# Patient Record
Sex: MALE | Race: WHITE | Employment: OTHER | URBAN - METROPOLITAN AREA
[De-identification: names, ages, dates, MRNs, and addresses within clinical notes are randomized per-mention and may not be internally consistent; named-entity substitution may affect disease eponyms.]

---

## 2017-03-23 LAB — HEMOGLOBIN A1C: 6.4

## 2017-03-31 ENCOUNTER — HOSPITAL ENCOUNTER (OUTPATIENT)
Dept: NON INVASIVE DIAGNOSTICS | Age: 82
Discharge: HOME | End: 2017-03-31
Attending: INTERNAL MEDICINE
Payer: MEDICARE

## 2017-03-31 DIAGNOSIS — R60.9 EDEMA: ICD-10-CM

## 2017-03-31 DIAGNOSIS — R01.1 UNDIAGNOSED CARDIAC MURMURS: ICD-10-CM

## 2017-03-31 DIAGNOSIS — E11.9 DIABETES MELLITUS (HCC): ICD-10-CM

## 2017-03-31 PROCEDURE — 93306 TTE W/DOPPLER COMPLETE: CPT

## 2017-10-01 RX ORDER — CLOPIDOGREL BISULFATE 75 MG/1
TABLET ORAL
Qty: 90 TAB | Refills: 3 | Status: SHIPPED | OUTPATIENT
Start: 2017-10-01 | End: 2017-10-06 | Stop reason: SDUPTHER

## 2017-10-06 ENCOUNTER — OFFICE VISIT (OUTPATIENT)
Dept: INTERNAL MEDICINE CLINIC | Age: 82
End: 2017-10-06

## 2017-10-06 VITALS
SYSTOLIC BLOOD PRESSURE: 137 MMHG | BODY MASS INDEX: 30.31 KG/M2 | HEART RATE: 71 BPM | OXYGEN SATURATION: 97 % | DIASTOLIC BLOOD PRESSURE: 85 MMHG | WEIGHT: 200 LBS | RESPIRATION RATE: 18 BRPM | HEIGHT: 68 IN | TEMPERATURE: 97.9 F

## 2017-10-06 DIAGNOSIS — Z13.31 SCREENING FOR DEPRESSION: ICD-10-CM

## 2017-10-06 DIAGNOSIS — R60.9 EDEMA, UNSPECIFIED TYPE: ICD-10-CM

## 2017-10-06 DIAGNOSIS — K51.919 ULCERATIVE COLITIS WITH COMPLICATION, UNSPECIFIED LOCATION (HCC): Chronic | ICD-10-CM

## 2017-10-06 DIAGNOSIS — M25.561 CHRONIC PAIN OF BOTH KNEES: ICD-10-CM

## 2017-10-06 DIAGNOSIS — Z00.00 MEDICARE ANNUAL WELLNESS VISIT, SUBSEQUENT: Primary | ICD-10-CM

## 2017-10-06 DIAGNOSIS — G45.9 TRANSIENT CEREBRAL ISCHEMIA, UNSPECIFIED TYPE: ICD-10-CM

## 2017-10-06 DIAGNOSIS — I10 ESSENTIAL HYPERTENSION, BENIGN: ICD-10-CM

## 2017-10-06 DIAGNOSIS — Z71.89 ADVANCED CARE PLANNING/COUNSELING DISCUSSION: ICD-10-CM

## 2017-10-06 DIAGNOSIS — E11.9 TYPE 2 DIABETES MELLITUS WITHOUT COMPLICATION, WITHOUT LONG-TERM CURRENT USE OF INSULIN (HCC): ICD-10-CM

## 2017-10-06 DIAGNOSIS — Z23 ENCOUNTER FOR IMMUNIZATION: ICD-10-CM

## 2017-10-06 DIAGNOSIS — M25.562 CHRONIC PAIN OF BOTH KNEES: ICD-10-CM

## 2017-10-06 DIAGNOSIS — R01.1 HEART MURMUR: ICD-10-CM

## 2017-10-06 DIAGNOSIS — G89.29 CHRONIC PAIN OF BOTH KNEES: ICD-10-CM

## 2017-10-06 DIAGNOSIS — E78.5 HYPERLIPIDEMIA LDL GOAL <100: ICD-10-CM

## 2017-10-06 RX ORDER — CLOPIDOGREL BISULFATE 75 MG/1
75 TABLET ORAL DAILY
Qty: 90 TAB | Refills: 4 | Status: SHIPPED | COMMUNITY
Start: 2017-10-06 | End: 2018-09-24 | Stop reason: SDUPTHER

## 2017-10-06 NOTE — PATIENT INSTRUCTIONS
Medicare Part B Preventive Services Guidelines/Limitations Date last completed and Frequency Due Date   Cardiovascular Screening Blood Tests (every 5 years)  Total cholesterol, HDL, Triglycerides Order as a panel if possible Completed 8/2016    As recommended by your PCP As recommended by your PCP or Specialist   Colorectal Cancer Screening  -Fecal occult blood test (annual)  -Flexible sigmoidoscopy (5y)  -Screening colonoscopy (10y)  -Barium Enema Age 49-80; After age [de-identified] if history of abnormal results As recommended by your PCP or Specialist     Recommended every 5 to 10 years  As recommended by your PCP or Specialist     Counseling to Prevent Tobacco Use (up to 8 sessions per year)  - Counseling greater than 3 and up to 10 minutes  - Counseling greater than 10 minutes Patients must be asymptomatic of tobacco-related conditions to receive as preventive service N/A N/A   Diabetes Screening Tests (at least every 3 years, Medicare covers annually or at 6-month intervals for prediabetic patients)    Fasting blood sugar (FBS) or glucose tolerance test (GTT) Patient must be diagnosed with one of the following:  -Hypertension, Dyslipidemia, obesity, previous impaired FBS or GTT  Or any two of the following: overweight, FH of diabetes, age ? 72, history of gestational diabetes, birth of baby weighing more than 9 pounds Completed 3/2017    Recommended every 3-6 months for Pre-Diabetics and Diabetics As recommended by your PCP or Specialist     Glaucoma Screening (no USPSTF recommendation) Diabetes mellitus, family history, , age 48 or over,  American, age 72 or over Completed within the last year    Recommended annually As recommended by your PCP or Specialist   Prostate Cancer Screening (annually up to age 76)  - Digital rectal exam (VERENICE)  - Prostate specific antigen (PSA) Annually (age 48 or over), VERENICE not paid separately when covered E/M service is provided on same date As recommended by your PCP or Specialist    Recommended annually to age 76 As recommended by your PCP or Specialist     Seasonal Influenza Vaccination (annually)  Completed 10/2017    Recommended Annually Complete   TDAP Vaccination  Completed 8/2011    Recommended every 10 years As recommended by your PCP or Specialist   Zoster (Shingles) Vaccination Covered by Medicare Part D through the pharmacy- PCP provides prescription As recommended by your PCP or Specialist    Recommended once over age 48  Complete   Pneumococcal Vaccination (once after 72)  Pneumo 23-   Recommended once over the age of 72    Prevnar 15- 10/2016 Recommended once over the age of 72 Completed several years ago      Complete     Ultrasound Screening for Abdominal Aortic Aneurysm (AAA) (once) Patient must be referred through IPPE and not have had a screening for abdominal aortic aneurysm before under Medicare. Limited to patients who meet one of the following criteria:  - Men who are 73-68 years old and have smoked more than 100 cigarettes in their lifetime.  -Anyone with a FH of AAA  -Anyone recommended for screening by USPSTF Not indicated unless recommended by PCP   Not indicated unless recommended by PCP     Family Practice Management 2011    Please bring a copy of your completed advance medical directive to the office so it may be added to your medical record. Thank you. If you have any questions or concerns please feel free to contact me at [phone]. It was a pleasure meeting you today and participating in your healthcare. Lisa Conley RN      Medicare Wellness Visit, Male    The best way to live healthy is to have a healthy lifestyle by eating a well-balanced diet, exercising regularly, limiting alcohol and stopping smoking. Regular physical exams and screening tests are another way to keep healthy. Preventive exams provided by your health care provider can find health problems before they become diseases or illnesses.  Preventive services including immunizations, screening tests, monitoring and exams can help you take care of your own health. All people over age 72 should have a pneumovax  and and a prevnar shot to prevent pneumonia. These are once in a lifetime unless you and your provider decide differently. All people over 65 should have a yearly flu shot and a tetanus vaccine every 10 years. Screening for diabetes mellitus with a blood sugar test should be done every year. Glaucoma is a disease of the eye due to increased ocular pressure that can lead to blindness and it should be done every year by an eye professional.    Cardiovascular screening tests that check for elevated lipids (fatty part of blood) which can lead to heart disease and strokes should be done every 5 years. Colorectal screening that evaluates for blood or polyps in your colon should be done yearly as a stool test or every five years as a flexible sigmoidoscope or every 10 years as a colonoscopy up to age 76. Men up to age 76 may need a screening blood test for prostate cancer at certain intervals, depending on their personal and family history. This decision is between the patient and his provider. If you have been a smoker or had family history of abdominal aortic aneurysms, you and your provider may decide to schedule an ultrasound test of your aorta. Hepatitis C screening is also recommended for anyone born between [birth year] through [address]. A shingles vaccine is also recommended once in a lifetime after age 61. Your Medicare Wellness Exam is recommended annually.     Here is a list of your current Health Maintenance items with a due date:  Health Maintenance Due   Topic Date Due    Shingles Vaccine  10/26/1990    Eye Exam  02/26/2017    Albumin Urine Test  04/19/2017    Diabetic Foot Care  05/04/2017    Cholesterol Test   08/13/2017    Hemoglobin A1C    09/22/2017

## 2017-10-06 NOTE — PROGRESS NOTES
Nurse Navigator Medicare Wellness Visit performed by MARLEE Beltran RN    This is a Subsequent Yousuf Exam (AWV) (Performed 12 months after IPPE or effective date of Medicare Part B enrollment, Once in a lifetime)    I have reviewed the patient's medical history in detail and updated the computerized patient record. History     Past Medical History:   Diagnosis Date    Arrhythmia     a fib on occasion    Arthritis     Autoimmune disease ([address]     Questionable Fibromyalgia    Cancer ([address]     basal cell on nose, ear and cheek    Chronic pain     Diabetes ([address]     Hypertension     Other and unspecified hyperlipidemia 4/7/2013    Other ill-defined conditions(799.89)     bilateral glaucoma    Other ill-defined conditions(799.89)     UC    Sleep apnea     Stool color black     Stroke ([address] 11/2010 4/13, 6/15  TIA    UC (ulcerative colitis) ([facility] [address] 10/14/2009    Unspecified sleep apnea     on bipap      Past Surgical History:   Procedure Laterality Date    HX COLONOSCOPY  12/2014    HX ORTHOPAEDIC      left knee arthroscopy     Current Outpatient Prescriptions   Medication Sig Dispense Refill    clopidogrel (PLAVIX) 75 mg tab Take 1 Tab by mouth daily. 90 Tab 4    furosemide (LASIX) 20 mg tablet       COREG CR 10 mg CR capsule       buPROPion (WELLBUTRIN) 100 mg tablet Take 1 Tab by mouth two (2) times a day. 60 Tab 3    cholecalciferol, vitamin D3, (VITAMIN D3) 2,000 unit tab Take  by mouth.  cyanocobalamin (VITAMIN B-12) 1,000 mcg tablet Take 1,000 mcg by mouth daily.  FOLIC ACID/MULTIVIT-MIN/LUTEIN (CENTRUM SILVER PO) Take  by mouth.  Calcium-Cholecalciferol, D3, 600 mg(1,500mg) -400 unit chew Take  by mouth.  timolol (BETIMOL) 0.5 % ophthalmic solution Administer 1 drop to both eyes two (2) times a day. use in affected eye(s)      sulfasalazine (AZULFIDINE) 500 mg tablet Take 500 mg by mouth two (2) times a day.  2 tabs      folic acid (FOLVITE) 1 mg tablet Take  by mouth daily.  metFORMIN (GLUCOPHAGE) 1,000 mg tablet Take 1,000 mg by mouth two (2) times daily (with meals).  predniSONE (DELTASONE) 1 mg tablet Take 4 mg by mouth daily (with breakfast).  omega-3 fatty acids-vitamin e (FISH OIL) 1,000 mg Cap Take 1 Cap by mouth three (3) times daily.  benazepril (LOTENSIN) 40 mg tablet Take 40 mg by mouth daily.  bimatoprost (LUMIGAN) 0.03 % ophthalmic drops Administer 1 Drop to both eyes every evening.  ACETAMINOPHEN (TYLENOL ARTHRITIS PAIN PO) Take 500 mg by mouth two (2) times a day.  [facility]) 3.75 gram PwPk powder packet Take 625 g by mouth daily (after breakfast). Indications: 625mg tablets takes 3 tablets by mouth every am and pm       Allergies   Allergen Reactions    Statins-Hmg-Coa Reductase Inhibitors Myalgia     Family History   Problem Relation Age of Onset    Stroke Mother     Dementia Mother     Cancer Father      pancreatic    Stroke Brother     Cancer Brother     Stroke Brother      Social History   Substance Use Topics    Smoking status: Never Smoker    Smokeless tobacco: Never Used    Alcohol use No     Patient Active Problem List   Diagnosis Code    Essential hypertension, benign I10    Type 2 diabetes mellitus without complication ([facility] U44.5    UC (ulcerative colitis) ([facility] [facility] K51.90    TIA (transient ischemic attack) G45.9    Hyperlipidemia LDL goal <100 E78.5    DJD (degenerative joint disease) M19.90    PMR (polymyalgia rheumatica) ([facility] [address] M35.3    Sleep apnea G47.30    Advanced care planning/counseling discussion Z71.89       Depression Risk Factor Screening:   Patient denies feelings of being down, depressed or hopeless at this time. Patient states that they have a strong support system within their family & friends.    PHQ over the last two weeks 10/6/2017   Little interest or pleasure in doing things Not at all   Feeling down, depressed or hopeless Not at all   Total Score PHQ 2 0     Alcohol Risk Factor Screening: You do not drink alcohol or very rarely. Functional Ability and Level of Safety:   Hearing Loss  Hearing is good. Activities of Daily Living  The home contains: handrails and grab bars  Patient needs help with:  transportation, shopping, preparing meals, laundry, housework and managing money   Patient states that he lives with his wife in a private residence. Patient ambulates with the assistance of a rollator for stability & balance. Patient attempts to be independent in his ADL's, but his wife does provide assistance with the items listed in chart below. Patient shares that his wife provides his transportation. Patient states that he would like to be more physically active, but he is limited due to his medical history. ADL Assessment 10/6/2017   Feeding yourself No Help Needed   Getting from bed to chair No Help Needed   Getting dressed No Help Needed   Bathing or showering No Help Needed   Walk across the room (includes cane/walker) No Help Needed   Using the telphone No Help Needed   Taking your medications No Help Needed   Preparing meals Help Needed   Managing money (expenses/bills) Help Needed   Moderately strenuous housework (laundry) Help Needed   Shopping for personal items (toiletries/medicines) Help Needed   Shopping for groceries Help Needed   Driving Help Needed   Climbing a flight of stairs Help Needed   Getting to places beyond walking distances Help Needed     Patient denies falls within the past year & verbalizes awareness of fall prevention strategies. Fall RiskFall Risk Assessment, last 12 mths 10/6/2017   Able to walk? Yes   Fall in past 12 months? No       Abuse Screen  Patient is not abused   Abuse Screening Questionnaire 10/6/2017   Do you ever feel afraid of your partner? N   Are you in a relationship with someone who physically or mentally threatens you? N   Is it safe for you to go home?  Y       Cognitive Screening   Evaluation of Cognitive Function:  Has your family/caregiver stated any concerns about your memory: no      Patient Care Team   Patient Care Team:  Reuben Balderas MD as PCP - General (Internal Medicine)    Assessment/Plan   Education and counseling provided:  Are appropriate based on today's review and evaluation  End-of-Life planning (with patient's consent)  Pneumococcal Vaccine  Influenza Vaccine  Prostate cancer screening tests (PSA, covered annually)  Colorectal cancer screening tests  Screening for glaucoma  tdap & shingles vaccinations    Diagnoses and all orders for this visit:    1. Transient cerebral ischemia, unspecified type  -     clopidogrel (PLAVIX) 75 mg tab; Take 1 Tab by mouth daily. 2. Type 2 diabetes mellitus without complication, without long-term current use of insulin ([facility]    3. Essential hypertension, benign    4. Heart murmur    5. Hyperlipidemia LDL goal <100    6. Edema, unspecified type    7. Ulcerative colitis with complication, unspecified location ([facility] [address]    8. Encounter for immunization  -     Influenza virus vaccine ([address]) 72 years and older    5. Chronic pain of both knees    AWV Summary:    1. Patient states that a completed Advanced Medical Directive is at home. NN encouraged patient to bring a copy of the completed Advanced Medical Directive to the office for scanning into the medical record. Patient verbalized understanding & agreement. 2. Patient is up to date on the following immunizations:  Immunization History   Administered Date(s) Administered    Influenza High Dose Vaccine PF 10/15/2015, 10/06/2017    Pneumococcal Conjugate (PCV-13) 10/23/2016    Tdap 08/06/2011   Patient confirmed the aforementioned preventative immunization dates are correct. Patient denies receiving a shingles vaccine in the past & patient denies ever having a case of shingles in the past. Patient is unable to recall the date of their last pneumovax 23 vaccine.  NN encouraged patient to check home records & if information obtained, to please notify PCP's office with the details. Patient verbalized agreement. Patient's health maintenance immunization record has been updated & is current. 3. Due to the patient's age, screening PSA's & screening colonoscopies (report on file from 12/5/2014 performed by Dr. Juarez Evans) are no longer indicated unless recommended by PCP or a specialist.    4. Patient was not wearing corrective lenses. Patient reports having a routine eye exam & glaucoma screening within the last year performed by [facility]. Erlin Montana at the [facility]. FAVIAN faxed requesting a copy of patient's last eye exam with glaucoma screening with patient's verbal approval.     Patient verbalized understanding of all information discussed. Patient was given the opportunity to ask questions. Medication reconciliation completed by MA/ LPN and reviewed by PCP. Patient provided AVS, either a printed version or electronic version in emoteShare, which includes Medicare Wellness Preventative Screening Table.       Health Maintenance Due   Topic Date Due    ZOSTER VACCINE AGE 60>  10/26/1990    EYE EXAM RETINAL OR DILATED Q1  02/26/2017    MICROALBUMIN Q1  04/19/2017    FOOT EXAM Q1  05/04/2017    LIPID PANEL Q1  08/13/2017    HEMOGLOBIN A1C Q6M  09/22/2017

## 2017-10-06 NOTE — PROGRESS NOTES
HISTORY OF PRESENT ILLNESS  Joselo Chavez is a 80 y.o. male. HPI   Patient reports memory has been doing great. Patient continues on Plavix. Patient reports Dr. Karen Angel noticed heart murmur for first time. He states there has been no change since March. Patient continues on Lasix twice a day. Patient reports he still has severe knee pain and can not tolerate walking up and down stairs. Diabetic Review of Systems - medication compliance: compliant all of the time, diabetic diet compliance: compliant all of the time, home glucose monitoring: is performed sporadically, further diabetic ROS: no polyuria or polydipsia, no chest pain, dyspnea or TIA's, no numbness, tingling or pain in extremities. Other symptoms and concerns: Patient states his fasting sugars have been around 110. Patient last A1C was 6.4%  Hypertension ROS: taking medications as instructed, no medication side effects noted, no TIA's, no chest pain on exertion, no dyspnea on exertion, no swelling of ankles. New concerns:  Patient's BP in office today is 137/85. Hyperlipidemia:  Cardiovascular risk analysis - 80 y.o. male LDL goal is under 130. ROS: taking medications as instructed, no medication side effects noted, no TIA's, no chest pain on exertion, no dyspnea on exertion, no swelling of ankles. Tolerating meds, no myalgias or other side effects noted  New concerns: Patient repots pain with statins. He is currently not on any medications. Review of Systems   All other systems reviewed and are negative. Physical Exam   Constitutional: He is oriented to person, place, and time. He appears well-developed and well-nourished. HENT:   Head: Normocephalic and atraumatic. Right Ear: External ear normal.   Left Ear: External ear normal.   Nose: Nose normal.   Mouth/Throat: Oropharynx is clear and moist.   Eyes: Conjunctivae and EOM are normal.   Neck: Normal range of motion. Neck supple.    Cardiovascular: Normal rate, regular rhythm, normal heart sounds and intact distal pulses. Pulmonary/Chest: Effort normal and breath sounds normal.   Abdominal: Soft. Bowel sounds are normal.   Genitourinary: Testes normal.   Musculoskeletal: Normal range of motion. Neurological: He is alert and oriented to person, place, and time. Skin: Skin is warm and dry. Psychiatric: He has a normal mood and affect. His behavior is normal. Judgment and thought content normal.   Nursing note and vitals reviewed. ASSESSMENT and PLAN  Diagnoses and all orders for this visit:    1. Transient cerebral ischemia, unspecified type  Stable - continue current medications. -     clopidogrel (PLAVIX) 75 mg tab; Take 1 Tab by mouth daily. 2. Type 2 diabetes mellitus without complication, without long-term current use of insulin (HCC)  Sugars stable. Continue to follow diabetic diet and monitor sugars. 3. Essential hypertension, benign  BP is at goal. I do not recommend any change in medications. 4. Heart murmur  Stable - no change, continue to follow up with Dr. Katia Scott. 5. Hyperlipidemia LDL goal <100  Patient can not tolerate statins and is not no medication at this time. Will continue to monitor. 6. Edema, unspecified type  Stable - continue on current medications and continue to follow up with Dr. Katia Scott    7. Ulcerative colitis with complication, unspecified location ([facility] [address]    8. Encounter for immunization  Administered flu vaccine today in office.   -     Influenza virus vaccine ([address]) 72 years and older    5. Chronic pain of both knees  Patient is avoiding stairs, continue to stretch and walk.    lab results and schedule of future lab studies reviewed with patient  reviewed diet, exercise and weight control    Written by Sydni Schaeffer, as dictated by Felipa Eaton MD.     Current diagnosis and concerns discussed with pt at length.  Understands risks and benefits or current treatment plan and medications and accepts the treatment and medication with any possible risks. Pt asks appropriate questions which were answered. Pt instructed to call with any concerns or problems.

## 2018-09-24 ENCOUNTER — TELEPHONE (OUTPATIENT)
Dept: INTERNAL MEDICINE CLINIC | Age: 83
End: 2018-09-24

## 2018-09-24 DIAGNOSIS — G45.9 TRANSIENT CEREBRAL ISCHEMIA, UNSPECIFIED TYPE: ICD-10-CM

## 2018-09-24 RX ORDER — CLOPIDOGREL BISULFATE 75 MG/1
TABLET ORAL
Qty: 90 TAB | Refills: 3 | Status: SHIPPED | OUTPATIENT
Start: 2018-09-24 | End: 2019-09-22 | Stop reason: SDUPTHER

## 2018-10-17 ENCOUNTER — OFFICE VISIT (OUTPATIENT)
Dept: INTERNAL MEDICINE CLINIC | Age: 83
End: 2018-10-17

## 2018-10-17 VITALS
TEMPERATURE: 98.3 F | WEIGHT: 197.6 LBS | OXYGEN SATURATION: 96 % | RESPIRATION RATE: 14 BRPM | BODY MASS INDEX: 29.95 KG/M2 | HEIGHT: 68 IN | HEART RATE: 84 BPM | SYSTOLIC BLOOD PRESSURE: 136 MMHG | DIASTOLIC BLOOD PRESSURE: 75 MMHG

## 2018-10-17 DIAGNOSIS — M15.8 OTHER OSTEOARTHRITIS INVOLVING MULTIPLE JOINTS: ICD-10-CM

## 2018-10-17 DIAGNOSIS — Z00.00 MEDICARE ANNUAL WELLNESS VISIT, SUBSEQUENT: Primary | ICD-10-CM

## 2018-10-17 DIAGNOSIS — E11.9 TYPE 2 DIABETES MELLITUS WITHOUT COMPLICATION, WITHOUT LONG-TERM CURRENT USE OF INSULIN (HCC): ICD-10-CM

## 2018-10-17 DIAGNOSIS — I10 ESSENTIAL HYPERTENSION, BENIGN: ICD-10-CM

## 2018-10-17 DIAGNOSIS — Z23 ENCOUNTER FOR IMMUNIZATION: ICD-10-CM

## 2018-10-17 DIAGNOSIS — E78.5 HYPERLIPIDEMIA LDL GOAL <100: ICD-10-CM

## 2018-10-17 DIAGNOSIS — K51.919 ULCERATIVE COLITIS WITH COMPLICATION, UNSPECIFIED LOCATION (HCC): ICD-10-CM

## 2018-10-17 DIAGNOSIS — R60.9 EDEMA, UNSPECIFIED TYPE: ICD-10-CM

## 2018-10-17 NOTE — PROGRESS NOTES
HISTORY OF PRESENT ILLNESS Roberth Griffin is a 80 y.o. male. HPI Hypertension ROS: taking medications as instructed, no medication side effects noted, no TIA's, no chest pain on exertion, no dyspnea on exertion, no swelling of ankles. New concerns:  Patient's BP in office today is 136/75. He continues on current meds. Edema: Stable, and well-managed with Lasix. Diabetic Review of Systems - medication compliance: compliant all of the time, further diabetic ROS: no polyuria or polydipsia, no chest pain, dyspnea or TIA's, no numbness, tingling or pain in extremities. Other symptoms and concerns: Pt takes Metformin (500mg 3x/day). He continues to f/u with Dr. Sarah Lopez. His last A1c was 6.6%. Hyperlipidemia: 
Cardiovascular risk analysis - 80 y.o. male LDL goal is under 100. ROS: No TIA's, no chest pain on exertion, no dyspnea on exertion, no swelling of ankles. New concerns: Pt doesn't take statins due to side effects (myalgia). TIA: Stable, and well-managed with Plavix. UC: Stable, and well-managed with Azulfidine. Pt endorses stable bowels. Denies blood in stool. Osteoarthritis: Stable. Pt continues to experience pain in knee joints. He avoids stairs and takes Prednisone (1mg/day). Denies greater strength in one leg over other. He continues to f/u with Dr. Laurie Davis (rheumatologist) who monitors Prednisone. Pt endorses stable memory. Review of Systems All other systems reviewed and are negative. Physical Exam  
Constitutional: He is oriented to person, place, and time. He appears well-developed and well-nourished. HENT:  
Head: Normocephalic and atraumatic. Right Ear: External ear normal.  
Left Ear: External ear normal.  
Nose: Nose normal.  
Mouth/Throat: Oropharynx is clear and moist.  
Eyes: Conjunctivae and EOM are normal.  
Neck: Normal range of motion. Neck supple. Cardiovascular: Normal rate, regular rhythm and intact distal pulses. Murmur heard. Pulmonary/Chest: Effort normal and breath sounds normal.  
Abdominal: Soft. Bowel sounds are normal.  
Genitourinary: Testes normal.  
Musculoskeletal: Normal range of motion. Ambulates with cane today in clinic. Neurological: He is alert and oriented to person, place, and time. Skin: Skin is warm and dry. Psychiatric: He has a normal mood and affect. His behavior is normal. Judgment and thought content normal.  
Nursing note and vitals reviewed. ASSESSMENT and PLAN Diagnoses and all orders for this visit: 
 
Type 2 diabetes mellitus without complication, without long-term current use of insulin ([address] Sugars stable. Continue to follow diabetic diet and monitor sugars. Pt will continue to f/u with Dr. Nikki Celis. Essential hypertension, benign BP is at goal. I do not recommend any change in medications. Ulcerative colitis with complication, unspecified location ([address] Stable, and well-managed. No change in medications. Other osteoarthritis involving multiple joints Stable, and well-managed. No change in medications. Pt will continue to f/u with Dr. Albin Reyes. Hyperlipidemia LDL goal <100 Stable condition. Pt doesn't take statins due to side effects (myalgia). Edema, unspecified type Stable, and well-managed. No change in medications. Encounter for immunization Administered flu shot today in office.  
-     INFLUENZA VACCINE INACTIVATED (IIV), SUBUNIT, ADJUVANTED, IM Additional Comments: Pt will schedule 1-year routine f/u. Lab results and schedule of future lab studies reviewed with patient. Reviewed diet, exercise and weight control. Written by Jocelyn Wilson, as dictated by Khris Sebastian MD.  
 
Current diagnosis and concerns discussed with pt at length. Understands risks and benefits or current treatment plan and medications and accepts the treatment and medication with any possible risks.  Pt asks appropriate questions which were answered. Pt instructed to call with any concerns or problems.

## 2018-10-17 NOTE — PROGRESS NOTES
This is the Subsequent Medicare Annual Wellness Exam, performed 12 months or more after the Initial AWV or the last Subsequent AWV I have reviewed the patient's medical history in detail and updated the computerized patient record. History Past Medical History:  
Diagnosis Date  Arrhythmia   
 a fib on occasion  Arthritis  Autoimmune disease ([facility] Questionable Fibromyalgia  Cancer ([facility]   
 basal cell on nose, ear and cheek  Chronic pain  Diabetes ([facility]  Hypertension  Other and unspecified hyperlipidemia 4/7/2013  Other ill-defined conditions(799.89)   
 bilateral glaucoma  Other ill-defined conditions(799.89) UC  Sleep apnea  Stool color black  Stroke [facility]) 11/2010 4/13, 6/15  TIA  UC (ulcerative colitis) ([facility] 10/14/2009  Unspecified sleep apnea   
 on bipap Past Surgical History:  
Procedure Laterality Date  HX COLONOSCOPY  12/2014  HX ORTHOPAEDIC    
 left knee arthroscopy Current Outpatient Medications Medication Sig Dispense Refill  clopidogrel (PLAVIX) 75 mg tab TAKE 1 TABLET DAILY 90 Tab 3  furosemide (LASIX) 20 mg tablet  COREG CR 10 mg CR capsule  cholecalciferol, vitamin D3, (VITAMIN D3) 2,000 unit tab Take  by mouth.  cyanocobalamin (VITAMIN B-12) 1,000 mcg tablet Take 1,000 mcg by mouth daily.  FOLIC ACID/MULTIVIT-MIN/LUTEIN (CENTRUM SILVER PO) Take  by mouth.  Calcium-Cholecalciferol, D3, 600 mg(1,500mg) -400 unit chew Take  by mouth.  timolol (BETIMOL) 0.5 % ophthalmic solution Administer 1 drop to both eyes two (2) times a day. use in affected eye(s)  sulfasalazine (AZULFIDINE) 500 mg tablet Take 500 mg by mouth two (2) times a day. 2 tabs  folic acid (FOLVITE) 1 mg tablet Take  by mouth daily.  metFORMIN (GLUCOPHAGE) 500 mg tablet Take 500 mg by mouth three (3) times daily.     
 predniSONE (DELTASONE) 1 mg tablet Take 4 mg by mouth daily (with breakfast).  omega-3 fatty acids-vitamin e (FISH OIL) 1,000 mg Cap Take 1 Cap by mouth three (3) times daily.  benazepril (LOTENSIN) 40 mg tablet Take 40 mg by mouth daily.  bimatoprost (LUMIGAN) 0.03 % ophthalmic drops Administer 1 Drop to both eyes every evening.  ACETAMINOPHEN (TYLENOL ARTHRITIS PAIN PO) Take 500 mg by mouth two (2) times a day. Allergies Allergen Reactions  Statins-Hmg-Coa Reductase Inhibitors Myalgia Family History Problem Relation Age of Onset  Stroke Mother  Dementia Mother  Cancer Father   
     pancreatic  Stroke Brother  Cancer Brother  Stroke Brother Social History Tobacco Use  Smoking status: Never Smoker  Smokeless tobacco: Never Used Substance Use Topics  Alcohol use: No  
 
Patient Active Problem List  
Diagnosis Code  Essential hypertension, benign I10  Type 2 diabetes mellitus without complication ([facility]) E89.8  UC (ulcerative colitis) ([facility] [address] K51.90  TIA (transient ischemic attack) G45.9  Hyperlipidemia LDL goal <100 E78.5  DJD (degenerative joint disease) M19.90  
 PMR (polymyalgia rheumatica) ([facility]) M35.3  Sleep apnea G47.30  Advanced care planning/counseling discussion Z71.89 Depression Risk Factor Screening: PHQ over the last two weeks 10/17/2018 Little interest or pleasure in doing things Not at all Feeling down, depressed, irritable, or hopeless Not at all Total Score PHQ 2 0 Alcohol Risk Factor Screening: You do not drink alcohol or very rarely. Functional Ability and Level of Safety:  
Hearing Loss Hearing is good. Activities of Daily Living The home contains: handrails and grab bars Patient needs help with:  transportation and walking Fall Risk Fall Risk Assessment, last 12 mths 10/17/2018 Able to walk? Yes Fall in past 12 months? No  
 
 
Abuse Screen Patient is not abused Cognitive Screening Evaluation of Cognitive Function: Has your family/caregiver stated any concerns about your memory: no 
Normal 
 
Patient Care Team  
Patient Care Team: 
Noa Palafox MD as PCP - General (Internal Medicine) Assessment/Plan Education and counseling provided: 
Are appropriate based on today's review and evaluation End-of-Life planning (with patient's consent) Diagnoses and all orders for this visit: 
 
Type 2 diabetes mellitus without complication, without long-term current use of insulin ([facility] [address] Essential hypertension, benign Ulcerative colitis with complication, unspecified location ([facility] [address] Other osteoarthritis involving multiple joints Hyperlipidemia LDL goal <100 Edema, unspecified type Health Maintenance Due Topic Date Due  Shingrix Vaccine Age 50> (1 of 2) 12/26/1980  Pneumococcal 65+ Low/Medium Risk (2 of 2 - PPSV23) 10/23/2017  
 EYE EXAM RETINAL OR DILATED Q1  07/14/2018  Influenza Age 5 to Adult  08/01/2018  HEMOGLOBIN A1C Q6M  09/15/2018  MEDICARE YEARLY EXAM  10/07/2018  LIPID PANEL Q1  11/04/2018 This note will not be viewable in [address]

## 2018-10-17 NOTE — ACP (ADVANCE CARE PLANNING)
He has an AMD at home - his wife would make medical decisions for him- encouraged to bring it in to be scanned

## 2018-10-17 NOTE — PATIENT INSTRUCTIONS
Medicare Wellness Visit, Male The best way to live healthy is to have a lifestyle where you eat a well-balanced diet, exercise regularly, limit alcohol use, and quit all forms of tobacco/nicotine, if applicable. Regular preventive services are another way to keep healthy. Preventive services (vaccines, screening tests, monitoring & exams) can help personalize your care plan, which helps you manage your own care. Screening tests can find health problems at the earliest stages, when they are easiest to treat. [facility] follows the current, evidence-based guidelines published by the [facility] ([facility]) when recommending preventive services for our patients. Because we follow these guidelines, sometimes recommendations change over time as research supports it. (For example, a prostate screening blood test is no longer routinely recommended for men with no symptoms.) Of course, you and your doctor may decide to screen more often for some diseases, based on your risk and co-morbidities (chronic disease you are already diagnosed with). Preventive services for you include: - Medicare offers their members a free annual wellness visit, which is time for you and your primary care provider to discuss and plan for your preventive service needs. Take advantage of this benefit every year! 
-All adults over age 72 should receive the recommended pneumonia vaccines. Current USPSTF guidelines recommend a series of two vaccines for the best pneumonia protection.  
-All adults should have a flu vaccine yearly and an ECG.  All adults age 61 and older should receive a shingles vaccine once in their lifetime.   
-All adults age 38-68 who are overweight should have a diabetes screening test once every three years.  
-Other screening tests & preventive services for persons with diabetes include: an eye exam to screen for diabetic retinopathy, a kidney function test, a foot exam, and stricter control over your cholesterol.  
-Cardiovascular screening for adults with routine risk involves an electrocardiogram (ECG) at intervals determined by the provider.  
-Colorectal cancer screening should be done for adults age 54-65 with no increased risk factors for colorectal cancer. There are a number of acceptable methods of screening for this type of cancer. Each test has its own benefits and drawbacks. Discuss with your provider what is most appropriate for you during your annual wellness visit. The different tests include: colonoscopy (considered the best screening method), a fecal occult blood test, a fecal DNA test, and sigmoidoscopy. 
-All adults born between [facility] should be screened once for Hepatitis C. 
-An Abdominal Aortic Aneurysm (AAA) Screening is recommended for men age 73-68 who has ever smoked in their lifetime. Here is a list of your current Health Maintenance items (your personalized list of preventive services) with a due date: 
Health Maintenance Due Topic Date Due  Shingles Vaccine (1 of 2) 12/26/1980  Pneumococcal Vaccine (2 of 2 - PPSV23) 10/23/2017 [address] [place] [address] Eye Exam  07/14/2018  Flu Vaccine  08/01/2018  Hemoglobin A1C    09/15/2018 [address] [place] [address] Annual Well Visit  10/07/2018  Cholesterol Test   11/04/2018

## 2018-11-15 LAB — HEMOGLOBIN A1C: 6 %

## 2018-12-31 ENCOUNTER — HOSPITAL ENCOUNTER (OUTPATIENT)
Dept: VASCULAR SURGERY | Age: 83
Discharge: HOME | End: 2018-12-31
Attending: INTERNAL MEDICINE
Payer: MEDICARE

## 2018-12-31 DIAGNOSIS — E78.49 FAT INDUCED HYPERLIPIDEMIA: ICD-10-CM

## 2018-12-31 PROCEDURE — 93922 UPR/L XTREMITY ART 2 LEVELS: CPT

## 2018-12-31 NOTE — PROCEDURES
[facility] *** FINAL REPORT *** Name: Edmond Mondragon 
MRN: [id number]    Outpatient : 26 Dec 1930 [facility] Order #: [id number] [facility] Visit #: [id number] Date: 31 Dec 2018 TYPE OF TEST: Peripheral Arterial Testing REASON FOR TEST Claudication Right Leg Segmentals: Normal 
 
                 mmHg Brachial         141 High thigh Low thigh Calf Posterior tibial 149 Dorsalis pedis   128 Peroneal 
Metatarsal 
Toe pressure      71 Doppler:    Normal 
PVR: 
Ankle/Brachial: 1.06 Left Leg Segmentals: Normal 
 
                 mmHg Brachial         125 High thigh Low thigh Calf Posterior tibial 139 Dorsalis pedis    19 Peroneal 
Metatarsal 
Toe pressure      60 Doppler:    Abnormal 
PVR: 
Ankle/Brachial: 0.99 INTERPRETATION/FINDINGS 
PROCEDURE:  Evaluation of lower extremity arteries with systolic blood 
 pressure measurement at the ankle and brachial level and calculation 
of the ankle/brachial pressure index (SHANNON). The exam may also include 
 pulse volume recording (PVR) plethysmography at the ankle level. IMPRESSION: 
1. No evidence of significant peripheral arterial disease at rest in 
the right leg. 2. The right ankle/brachial index is 1.06 and the left ankle/brachial 
index is 0.99. 
3. The right toe/brachial index is 0.50 and the left toe/brachial 
index is 0.43 both indicative of  moderate disease in the digits. 4. Severe vascular disease noted  in the left foot. ADDITIONAL COMMENTS I have personally reviewed the data relevant to the interpretation of 
this 
study. TECHNOLOGIST: Francesca Evans RVT Signed: 2018 02:46 PM 
 
PHYSICIAN: Neftali Lynch.  Briana Flaherty MD 
Signed: 2018 02:51 PM

## 2019-01-23 ENCOUNTER — HOSPITAL ENCOUNTER (OUTPATIENT)
Dept: ULTRASOUND IMAGING | Age: 84
Discharge: HOME | End: 2019-01-23
Attending: INTERNAL MEDICINE
Payer: MEDICARE

## 2019-01-23 DIAGNOSIS — N18.30 CHRONIC KIDNEY DISEASE, STAGE III (MODERATE) (HCC): ICD-10-CM

## 2019-01-23 PROCEDURE — 76770 US EXAM ABDO BACK WALL COMP: CPT

## 2019-03-25 ENCOUNTER — TELEPHONE (OUTPATIENT)
Dept: INTERNAL MEDICINE CLINIC | Age: 84
End: 2019-03-25

## 2019-03-25 NOTE — TELEPHONE ENCOUNTER
----- Message from aDvid Crhistiansen sent at 3/25/2019 12:24 PM EDT -----  Regarding: :  Dr. Uriarte Left wife Rosangela Srinivasan is requesting a referral to a cardiologist for clearance of kidney stones removal. Best contact [phone]

## 2019-03-26 ENCOUNTER — TELEPHONE (OUTPATIENT)
Dept: INTERNAL MEDICINE CLINIC | Age: 84
End: 2019-03-26

## 2019-03-26 NOTE — TELEPHONE ENCOUNTER
Patient's wife ,Epifanio Quintana, is requesting to speak with the nurse to discuss if it's okay for patient to stop taking plavix for a kidney stone removal procedure.  Also requesting to be referred to cardiologist due to needing a cardiac clearance , Epifanio Quintana can be reached at [phone]

## 2019-03-26 NOTE — TELEPHONE ENCOUNTER
Contacted pt wife and advised of appt needed for cardiac clearance of procedure. She understood and will return call to schedule.

## 2019-05-08 LAB
CHOLEST SERPL-MCNC: 295 MG/DL
HEMOGLOBIN A1C: 6.2 %
LDL-CHOLESTEROL: 198 MG/DL
TRIGLYCERIDES, 001174: 256

## 2019-09-22 DIAGNOSIS — G45.9 TRANSIENT CEREBRAL ISCHEMIA, UNSPECIFIED TYPE: ICD-10-CM

## 2019-09-22 RX ORDER — CLOPIDOGREL BISULFATE 75 MG/1
TABLET ORAL
Qty: 90 TAB | Refills: 4 | Status: SHIPPED | OUTPATIENT
Start: 2019-09-22 | End: 2020-12-15

## 2019-10-16 ENCOUNTER — OFFICE VISIT (OUTPATIENT)
Dept: INTERNAL MEDICINE CLINIC | Age: 84
End: 2019-10-16

## 2019-10-16 VITALS
WEIGHT: 201 LBS | BODY MASS INDEX: 30.46 KG/M2 | HEART RATE: 92 BPM | HEIGHT: 68 IN | DIASTOLIC BLOOD PRESSURE: 80 MMHG | RESPIRATION RATE: 16 BRPM | SYSTOLIC BLOOD PRESSURE: 120 MMHG | OXYGEN SATURATION: 95 % | TEMPERATURE: 97.3 F

## 2019-10-16 DIAGNOSIS — Z23 ENCOUNTER FOR IMMUNIZATION: ICD-10-CM

## 2019-10-16 DIAGNOSIS — I10 ESSENTIAL HYPERTENSION, BENIGN: ICD-10-CM

## 2019-10-16 DIAGNOSIS — N20.0 KIDNEY STONE: ICD-10-CM

## 2019-10-16 DIAGNOSIS — M25.561 CHRONIC PAIN OF BOTH KNEES: ICD-10-CM

## 2019-10-16 DIAGNOSIS — Z00.00 MEDICARE ANNUAL WELLNESS VISIT, SUBSEQUENT: Primary | ICD-10-CM

## 2019-10-16 DIAGNOSIS — G89.29 CHRONIC PAIN OF BOTH KNEES: ICD-10-CM

## 2019-10-16 DIAGNOSIS — M25.562 CHRONIC PAIN OF BOTH KNEES: ICD-10-CM

## 2019-10-16 DIAGNOSIS — G45.9 TRANSIENT CEREBRAL ISCHEMIA, UNSPECIFIED TYPE: ICD-10-CM

## 2019-10-16 DIAGNOSIS — E78.5 HYPERLIPIDEMIA LDL GOAL <100: ICD-10-CM

## 2019-10-16 DIAGNOSIS — K51.919 ULCERATIVE COLITIS WITH COMPLICATION, UNSPECIFIED LOCATION (HCC): ICD-10-CM

## 2019-10-16 DIAGNOSIS — R73.03 PREDIABETES: ICD-10-CM

## 2019-10-16 NOTE — PATIENT INSTRUCTIONS
Medicare Wellness Visit, Male The best way to live healthy is to have a lifestyle where you eat a well-balanced diet, exercise regularly, limit alcohol use, and quit all forms of tobacco/nicotine, if applicable. Regular preventive services are another way to keep healthy. Preventive services (vaccines, screening tests, monitoring & exams) can help personalize your care plan, which helps you manage your own care. Screening tests can find health problems at the earliest stages, when they are easiest to treat. [facility] follows the current, evidence-based guidelines published by the [facility] ([facility]) when recommending preventive services for our patients. Because we follow these guidelines, sometimes recommendations change over time as research supports it. (For example, a prostate screening blood test is no longer routinely recommended for men with no symptoms.) Of course, you and your doctor may decide to screen more often for some diseases, based on your risk and co-morbidities (chronic disease you are already diagnosed with). Preventive services for you include: - Medicare offers their members a free annual wellness visit, which is time for you and your primary care provider to discuss and plan for your preventive service needs. Take advantage of this benefit every year! 
-All adults over age 72 should receive the recommended pneumonia vaccines. Current USPSTF guidelines recommend a series of two vaccines for the best pneumonia protection.  
-All adults should have a flu vaccine yearly and an ECG.  All adults age 61 and older should receive a shingles vaccine once in their lifetime.   
-All adults age 38-68 who are overweight should have a diabetes screening test once every three years.  
-Other screening tests & preventive services for persons with diabetes include: an eye exam to screen for diabetic retinopathy, a kidney function test, a foot exam, and stricter control over your cholesterol.  
-Cardiovascular screening for adults with routine risk involves an electrocardiogram (ECG) at intervals determined by the provider.  
-Colorectal cancer screening should be done for adults age 54-65 with no increased risk factors for colorectal cancer. There are a number of acceptable methods of screening for this type of cancer. Each test has its own benefits and drawbacks. Discuss with your provider what is most appropriate for you during your annual wellness visit. The different tests include: colonoscopy (considered the best screening method), a fecal occult blood test, a fecal DNA test, and sigmoidoscopy. 
-All adults born between [facility] should be screened once for Hepatitis C. 
-An Abdominal Aortic Aneurysm (AAA) Screening is recommended for men age 73-68 who has ever smoked in their lifetime. Here is a list of your current Health Maintenance items (your personalized list of preventive services) with a due date: 
Health Maintenance Due Topic Date Due  
 Flu Vaccine  08/01/2019 [address] [place] [address] Annual Well Visit  10/18/2019

## 2019-10-16 NOTE — PROGRESS NOTES
HISTORY OF PRESENT ILLNESS Sparkle Aguilera is a 80 y.o. male. HPI Hypertension ROS: taking medications as instructed, no medication side effects noted, no TIA's, no chest pain on exertion, no dyspnea on exertion, no swelling of ankles. New concerns: BP in office today is 149/91. Pt notes his BP is 120/80 at home. He checks his BP sporadically. Hyperlipidemia: 
Cardiovascular risk analysis - 80 y.o. male LDL goal is under 100. ROS: taking medications as instructed, no medication side effects noted, no TIA's, no chest pain on exertion, no dyspnea on exertion, no swelling of ankles. Tolerating meds, no myalgias or other side effects noted New concerns: Pt's last LDL was 198 on 5/4/19. Kidney stone: Pt reports that they found an asymptomatic kidney stone. He has decreased his calcium intake. He was given 2 options- surgery or wait. He is concerned about not complying with Plavix after his TIA. He has chosen to defer the surgery for the time being. Knees: Pt reports that he does not have any cartilage left in his knees (L>R). He is trying to get by without surgery. He uses Tylenol arthritis 1x/day and 1 mg of Prednisone. He notes his L knee has buckled and refused to work for a few minutes 3 times in the recent months. Ulcerative Colitis: Stable, pt continues to comply with medications. He continues to f/u with Dr. Ledy Solano annually. Eye exam: Pt reports his last eye exam was normal 6 months ago (per Dr. Mary Garcia). Prediabetes: Stable and well-managed with diet and Metformin. Pt reports that he has slowly decreased Metformin to 2 tablet daily and his BG are stable and at goal. 
 
TIA: Stable, pt continues to comply with Plavix. Review of Systems All other systems reviewed and are negative. Physical Exam  
Constitutional: He is oriented to person, place, and time. He appears well-developed and well-nourished. HENT:  
Head: Normocephalic and atraumatic.   
Right Ear: External ear normal.  
 Left Ear: External ear normal.  
Nose: Nose normal.  
Mouth/Throat: Oropharynx is clear and moist.  
Eyes: Pupils are equal, round, and reactive to light. Conjunctivae and EOM are normal.  
Neck: Normal range of motion. Neck supple. Cardiovascular: Normal rate, regular rhythm and intact distal pulses. Murmur heard. Pulmonary/Chest: Effort normal and breath sounds normal.  
Abdominal: Soft. Bowel sounds are normal.  
Genitourinary: Rectum normal, prostate normal, testes normal and penis normal. Rectal exam shows anal tone normal.  
Musculoskeletal: Normal range of motion. Neurological: He is alert and oriented to person, place, and time. Skin: Skin is warm and dry. Psychiatric: He has a normal mood and affect. His behavior is normal. Judgment and thought content normal.  
Nursing note and vitals reviewed. ASSESSMENT and PLAN Diagnoses and all orders for this visit: 
 
1. Medicare annual wellness visit, subsequent 2. Essential hypertension, benign BP is at goal. I do not recommend any change in medications. 3. Hyperlipidemia LDL goal <100 Stable, patient is tolerating medications, no myalgias. I do not recommend any change in medications. 4. Kidney stone Informed pt that his TIA happened years ago, and he would not be at severe risk of another TIA if he did not comply with Plavix 1 week. Encouraged pt to do what he is most comfortable with since he is asymptomatic. Will continue to monitor for improvements or changes. 5. Ulcerative colitis with complication, unspecified location ([facility] Stable and well-managed. No change in medications. Pt will continue to f/u with GI (Dr. Jose Spivey) annually. 6. Transient cerebral ischemia, unspecified type Stable and well-managed with Plavix. No change in medications. 7. Chronic pain of both knees Stable, and relatively well-managed with Tylenol arthritis and 1 mg Prednisone daily. Pt is avoiding surgery at all costs.  Will continue to monitor for improvements or changes. Encouraged pt to continue using a walker to avoid \"knee-bucking\" falls. 8. Prediabetes Sugars stable with 2 tablets of Metformin daily. Continue to follow diabetic diet and monitor sugars. 9. Encounter for immunization Administered high dose flu injection today in office. 
-     INFLUENZA VACCINE INACTIVATED (IIV), SUBUNIT, ADJUVANTED, IM Lab results and schedule of future lab studies reviewed with patient. Reviewed diet, exercise and weight control. Written by Josemanuel Brown, as dictated by Conrado Calix MD.  
 
Current diagnosis and concerns discussed with pt at length. Understands risks and benefits or current treatment plan and medications and accepts the treatment and medication with any possible risks. Pt asks appropriate questions which were answered. Pt instructed to call with any concerns or problems. This note will not be viewable in [address]

## 2019-10-16 NOTE — PROGRESS NOTES
This is the Subsequent Medicare Annual Wellness Exam, performed 12 months or more after the Initial AWV or the last Subsequent AWV I have reviewed the patient's medical history in detail and updated the computerized patient record. History Past Medical History:  
Diagnosis Date  Arrhythmia   
 a fib on occasion  Arthritis  Autoimmune disease ([facility] [address] Questionable Fibromyalgia  Cancer ([facility] [address]   
 basal cell on nose, ear and cheek  Chronic pain  Diabetes ([facility]  Hypertension  Other and unspecified hyperlipidemia 4/7/2013  Other ill-defined conditions(799.89)   
 bilateral glaucoma  Other ill-defined conditions(799.89) UC  Sleep apnea  Stool color black  Stroke [facility]) 11/2010 4/13, 6/15  TIA  UC (ulcerative colitis) ([facility] [address] 10/14/2009  Unspecified sleep apnea   
 on bipap Past Surgical History:  
Procedure Laterality Date  HX COLONOSCOPY  12/2014  HX ORTHOPAEDIC    
 left knee arthroscopy Current Outpatient Medications Medication Sig Dispense Refill  clopidogrel (PLAVIX) 75 mg tab TAKE 1 TABLET DAILY 90 Tab 4  furosemide (LASIX) 20 mg tablet  COREG CR 10 mg CR capsule  cholecalciferol, vitamin D3, (VITAMIN D3) 2,000 unit tab Take  by mouth.  cyanocobalamin (VITAMIN B-12) 1,000 mcg tablet Take 1,000 mcg by mouth daily.  FOLIC ACID/MULTIVIT-MIN/LUTEIN (CENTRUM SILVER PO) Take  by mouth.  Calcium-Cholecalciferol, D3, 600 mg(1,500mg) -400 unit chew Take  by mouth.  timolol (BETIMOL) 0.5 % ophthalmic solution Administer 1 drop to both eyes two (2) times a day. use in affected eye(s)  sulfasalazine (AZULFIDINE) 500 mg tablet Take 500 mg by mouth two (2) times a day. 2 tabs  folic acid (FOLVITE) 1 mg tablet Take  by mouth daily.  metFORMIN (GLUCOPHAGE) 500 mg tablet Take 500 mg by mouth three (3) times daily.     
 predniSONE (DELTASONE) 1 mg tablet Take 4 mg by mouth daily (with breakfast).  omega-3 fatty acids-vitamin e (FISH OIL) 1,000 mg Cap Take 1 Cap by mouth three (3) times daily.  benazepril (LOTENSIN) 40 mg tablet Take 40 mg by mouth daily.  bimatoprost (LUMIGAN) 0.03 % ophthalmic drops Administer 1 Drop to both eyes every evening.  ACETAMINOPHEN (TYLENOL ARTHRITIS PAIN PO) Take 500 mg by mouth two (2) times a day. Allergies Allergen Reactions  Statins-Hmg-Coa Reductase Inhibitors Myalgia Family History Problem Relation Age of Onset  Stroke Mother  Dementia Mother  Cancer Father   
     pancreatic  Stroke Brother  Cancer Brother  Stroke Brother Social History Tobacco Use  Smoking status: Never Smoker  Smokeless tobacco: Never Used Substance Use Topics  Alcohol use: No  
 
Patient Active Problem List  
Diagnosis Code  Essential hypertension, benign I10  
 UC (ulcerative colitis) ([facility] [address] K51.90  TIA (transient ischemic attack) G45.9  Hyperlipidemia LDL goal <100 E78.5  DJD (degenerative joint disease) M19.90  
 PMR (polymyalgia rheumatica) ([facility] M35.3  Sleep apnea G47.30  Advanced care planning/counseling discussion Z71.89 Depression Risk Factor Screening:  
 
3 most recent PHQ Screens 10/16/2019 Little interest or pleasure in doing things Not at all Feeling down, depressed, irritable, or hopeless Not at all Total Score PHQ 2 0 Alcohol Risk Factor Screening: You do not drink alcohol or very rarely. Functional Ability and Level of Safety:  
Hearing Loss Hearing is good. Activities of Daily Living The home contains: no safety equipment. Patient does total self care Fall Risk Fall Risk Assessment, last 12 mths 10/16/2019 Able to walk? Yes Fall in past 12 months? Yes Fall with injury? No  
Number of falls in past 12 months 3 Fall Risk Score 3 Abuse Screen Patient is not abused Cognitive Screening Evaluation of Cognitive Function: Has your family/caregiver stated any concerns about your memory: no 
Normal 
 
Patient Care Team  
Patient Care Team: 
Dany Stuart MD as PCP - General (Internal Medicine) Assessment/Plan Education and counseling provided: 
Are appropriate based on today's review and evaluation End-of-Life planning (with patient's consent) Diagnoses and all orders for this visit: 1. Prediabetes 2. Essential hypertension, benign 3. Hyperlipidemia LDL goal <100 
 
4. Kidney stone 5. Ulcerative colitis with complication, unspecified location ([facility] [address] 6. Transient cerebral ischemia, unspecified type 7. Chronic pain of both knees Health Maintenance Due Topic Date Due  Influenza Age 5 to Adult  08/01/2019  MEDICARE YEARLY EXAM  10/18/2019 This note will not be viewable in [address]

## 2020-07-27 ENCOUNTER — TELEPHONE (OUTPATIENT)
Dept: INTERNAL MEDICINE CLINIC | Age: 85
End: 2020-07-27

## 2020-07-27 NOTE — TELEPHONE ENCOUNTER
Patient request a return call regarding stopping Rx Plavix before oral surgery 08/03 Patient best contact [phone]

## 2020-07-27 NOTE — TELEPHONE ENCOUNTER
Spoke with patient and advised him to hold Plavix five days prior to oral surgery. Pt understood and was thankful for the call.

## 2020-10-21 ENCOUNTER — OFFICE VISIT (OUTPATIENT)
Dept: INTERNAL MEDICINE CLINIC | Age: 85
End: 2020-10-21
Payer: MEDICARE

## 2020-10-21 VITALS
DIASTOLIC BLOOD PRESSURE: 69 MMHG | RESPIRATION RATE: 16 BRPM | HEIGHT: 68 IN | TEMPERATURE: 97.6 F | WEIGHT: 194.4 LBS | SYSTOLIC BLOOD PRESSURE: 120 MMHG | HEART RATE: 62 BPM | BODY MASS INDEX: 29.46 KG/M2 | OXYGEN SATURATION: 96 %

## 2020-10-21 DIAGNOSIS — Z00.00 MEDICARE ANNUAL WELLNESS VISIT, SUBSEQUENT: Primary | ICD-10-CM

## 2020-10-21 DIAGNOSIS — Z23 NEEDS FLU SHOT: ICD-10-CM

## 2020-10-21 DIAGNOSIS — R73.03 PREDIABETES: ICD-10-CM

## 2020-10-21 DIAGNOSIS — K51.919 ULCERATIVE COLITIS WITH COMPLICATION, UNSPECIFIED LOCATION (HCC): ICD-10-CM

## 2020-10-21 DIAGNOSIS — G45.9 TRANSIENT CEREBRAL ISCHEMIA, UNSPECIFIED TYPE: ICD-10-CM

## 2020-10-21 DIAGNOSIS — M35.3 PMR (POLYMYALGIA RHEUMATICA) (HCC): ICD-10-CM

## 2020-10-21 DIAGNOSIS — I10 ESSENTIAL HYPERTENSION, BENIGN: ICD-10-CM

## 2020-10-21 DIAGNOSIS — E78.5 HYPERLIPIDEMIA LDL GOAL <100: ICD-10-CM

## 2020-10-21 LAB
ALBUMIN SERPL-MCNC: 3.9 G/DL (ref 3.5–5)
ALBUMIN/GLOB SERPL: 1.1 {RATIO} (ref 1.1–2.2)
ALP SERPL-CCNC: 63 U/L (ref 45–117)
ALT SERPL-CCNC: 12 U/L (ref 12–78)
ANION GAP SERPL CALC-SCNC: 8 MMOL/L (ref 5–15)
AST SERPL-CCNC: 15 U/L (ref 15–37)
BILIRUB SERPL-MCNC: 0.4 MG/DL (ref 0.2–1)
BUN SERPL-MCNC: 28 MG/DL (ref 6–20)
BUN/CREAT SERPL: 17 (ref 12–20)
CALCIUM SERPL-MCNC: 9.6 MG/DL (ref 8.5–10.1)
CHLORIDE SERPL-SCNC: 104 MMOL/L (ref 97–108)
CHOLEST SERPL-MCNC: 290 MG/DL
CO2 SERPL-SCNC: 27 MMOL/L (ref 21–32)
CREAT SERPL-MCNC: 1.65 MG/DL (ref 0.7–1.3)
CRP SERPL-MCNC: 0.52 MG/DL (ref 0–0.6)
ERYTHROCYTE [DISTWIDTH] IN BLOOD BY AUTOMATED COUNT: 12.4 % (ref 11.5–14.5)
EST. AVERAGE GLUCOSE BLD GHB EST-MCNC: 126 MG/DL
GLOBULIN SER CALC-MCNC: 3.7 G/DL (ref 2–4)
GLUCOSE SERPL-MCNC: 153 MG/DL (ref 65–100)
HBA1C MFR BLD: 6 % (ref 4–5.6)
HCT VFR BLD AUTO: 33.1 % (ref 36.6–50.3)
HDLC SERPL-MCNC: 40 MG/DL
HDLC SERPL: 7.3 {RATIO} (ref 0–5)
HGB BLD-MCNC: 10.6 G/DL (ref 12.1–17)
LDLC SERPL CALC-MCNC: 182 MG/DL (ref 0–100)
LIPID PROFILE,FLP: ABNORMAL
MCH RBC QN AUTO: 33.2 PG (ref 26–34)
MCHC RBC AUTO-ENTMCNC: 32 G/DL (ref 30–36.5)
MCV RBC AUTO: 103.8 FL (ref 80–99)
NRBC # BLD: 0 K/UL (ref 0–0.01)
NRBC BLD-RTO: 0 PER 100 WBC
PLATELET # BLD AUTO: 257 K/UL (ref 150–400)
PMV BLD AUTO: 10.2 FL (ref 8.9–12.9)
POTASSIUM SERPL-SCNC: 4.7 MMOL/L (ref 3.5–5.1)
PROT SERPL-MCNC: 7.6 G/DL (ref 6.4–8.2)
RBC # BLD AUTO: 3.19 M/UL (ref 4.1–5.7)
SODIUM SERPL-SCNC: 139 MMOL/L (ref 136–145)
TRIGL SERPL-MCNC: 340 MG/DL (ref ?–150)
VLDLC SERPL CALC-MCNC: 68 MG/DL
WBC # BLD AUTO: 8.3 K/UL (ref 4.1–11.1)

## 2020-10-21 PROCEDURE — 3288F FALL RISK ASSESSMENT DOCD: CPT | Performed by: INTERNAL MEDICINE

## 2020-10-21 PROCEDURE — 1100F PTFALLS ASSESS-DOCD GE2>/YR: CPT | Performed by: INTERNAL MEDICINE

## 2020-10-21 PROCEDURE — G8432 DEP SCR NOT DOC, RNG: HCPCS | Performed by: INTERNAL MEDICINE

## 2020-10-21 PROCEDURE — 99215 OFFICE O/P EST HI 40 MIN: CPT | Performed by: INTERNAL MEDICINE

## 2020-10-21 PROCEDURE — G0008 ADMIN INFLUENZA VIRUS VAC: HCPCS

## 2020-10-21 PROCEDURE — G8427 DOCREV CUR MEDS BY ELIG CLIN: HCPCS | Performed by: INTERNAL MEDICINE

## 2020-10-21 PROCEDURE — G8419 CALC BMI OUT NRM PARAM NOF/U: HCPCS | Performed by: INTERNAL MEDICINE

## 2020-10-21 PROCEDURE — 90694 VACC AIIV4 NO PRSRV 0.5ML IM: CPT

## 2020-10-21 PROCEDURE — G0439 PPPS, SUBSEQ VISIT: HCPCS | Performed by: INTERNAL MEDICINE

## 2020-10-21 PROCEDURE — G8536 NO DOC ELDER MAL SCRN: HCPCS | Performed by: INTERNAL MEDICINE

## 2020-10-21 RX ORDER — PREDNISONE 1 MG/1
1 TABLET ORAL
Qty: 90 TAB | Refills: 3 | Status: SHIPPED | OUTPATIENT
Start: 2020-10-21 | End: 2021-01-01 | Stop reason: SDUPTHER

## 2020-10-21 RX ORDER — SODIUM BICARBONATE 650 MG/1
650 TABLET ORAL
COMMUNITY
Start: 2020-07-22 | End: 2021-01-01

## 2020-10-21 RX ORDER — EZETIMIBE 10 MG/1
10 TABLET ORAL DAILY
COMMUNITY
Start: 2020-10-01

## 2020-10-21 RX ORDER — FOLIC ACID 1 MG/1
1 TABLET ORAL DAILY
Qty: 90 TAB | Refills: 3 | Status: SHIPPED | OUTPATIENT
Start: 2020-10-21 | End: 2021-01-01

## 2020-10-21 RX ORDER — SULFASALAZINE 500 MG/1
500 TABLET ORAL 2 TIMES DAILY
Qty: 180 TAB | Refills: 3 | Status: SHIPPED | OUTPATIENT
Start: 2020-10-21 | End: 2020-10-26 | Stop reason: SDUPTHER

## 2020-10-21 NOTE — PROGRESS NOTES
This is the Subsequent Medicare Annual Wellness Exam, performed 12 months or more after the Initial AWV or the last Subsequent AWV I have reviewed the patient's medical history in detail and updated the computerized patient record. He did fall when he went to pick somtehing up ; fell backwards History Patient Active Problem List  
Diagnosis Code  Essential hypertension, benign I10  
 UC (ulcerative colitis) ([facility] [address] K51.90  TIA (transient ischemic attack) G45.9  Hyperlipidemia LDL goal <100 E78.5  DJD (degenerative joint disease) M19.90  
 PMR (polymyalgia rheumatica) ([facility]) M35.3  Sleep apnea G47.30  Advanced care planning/counseling discussion Z71.89 Past Medical History:  
Diagnosis Date  Arrhythmia   
 a fib on occasion  Arthritis  Autoimmune disease ([facility] [address] Questionable Fibromyalgia  Cancer ([facility] [address]   
 basal cell on nose, ear and cheek  Chronic pain  Diabetes ([facility] [address]  Hypertension  Other and unspecified hyperlipidemia 4/7/2013  Other ill-defined conditions(799.89)   
 bilateral glaucoma  Other ill-defined conditions(799.89) UC  Sleep apnea  Stool color black  Stroke [facility]) 11/2010 4/13, 6/15  TIA  UC (ulcerative colitis) ([facility] [address] 10/14/2009  Unspecified sleep apnea   
 on bipap Past Surgical History:  
Procedure Laterality Date  HX COLONOSCOPY  12/2014  HX ORTHOPAEDIC    
 left knee arthroscopy Current Outpatient Medications Medication Sig Dispense Refill  turmeric-herbal complex no. 278 150 mg cap Take 1,000 mg by mouth.  ezetimibe (ZETIA) 10 mg tablet 10 mg.    
 sodium bicarbonate 650 mg tablet 650 mg.    
 clopidogrel (PLAVIX) 75 mg tab TAKE 1 TABLET DAILY 90 Tab 4  furosemide (LASIX) 20 mg tablet  COREG CR 10 mg CR capsule  cholecalciferol, vitamin D3, (VITAMIN D3) 2,000 unit tab Take  by mouth.     
 cyanocobalamin (VITAMIN B-12) 1,000 mcg tablet Take 1,000 mcg by mouth daily.    
 FOLIC ACID/MULTIVIT-MIN/LUTEIN (CENTRUM SILVER PO) Take  by mouth.  Calcium-Cholecalciferol, D3, 600 mg(1,500mg) -400 unit chew Take  by mouth.  timolol (BETIMOL) 0.5 % ophthalmic solution Administer 1 drop to both eyes two (2) times a day. use in affected eye(s)  sulfasalazine (AZULFIDINE) 500 mg tablet Take 500 mg by mouth two (2) times a day. 2 tabs  folic acid (FOLVITE) 1 mg tablet Take  by mouth daily.  metFORMIN (GLUCOPHAGE) 500 mg tablet Take 500 mg by mouth three (3) times daily.  predniSONE (DELTASONE) 1 mg tablet Take 4 mg by mouth daily (with breakfast).  omega-3 fatty acids-vitamin e (FISH OIL) 1,000 mg Cap Take 1 Cap by mouth three (3) times daily.  benazepril (LOTENSIN) 40 mg tablet Take 40 mg by mouth daily.  bimatoprost (LUMIGAN) 0.03 % ophthalmic drops Administer 1 Drop to both eyes every evening.  ACETAMINOPHEN (TYLENOL ARTHRITIS PAIN PO) Take 500 mg by mouth two (2) times a day. Allergies Allergen Reactions  Statins-Hmg-Coa Reductase Inhibitors Myalgia Family History Problem Relation Age of Onset  Stroke Mother  Dementia Mother  Cancer Father   
     pancreatic  Stroke Brother  Cancer Brother  Stroke Brother Social History Tobacco Use  Smoking status: Never Smoker  Smokeless tobacco: Never Used Substance Use Topics  Alcohol use: No  
 
 
Depression Risk Factor Screening:  
 
3 most recent PHQ Screens 10/16/2019 Little interest or pleasure in doing things Not at all Feeling down, depressed, irritable, or hopeless Not at all Total Score PHQ 2 0 Alcohol Risk Screen Do you average more than 1 drink per night or more than 7 drinks a week: No 
 
In the past three months have you have had more than 4 drinks containing alcohol on one occasion: No 
 
 
 
Functional Ability and Level of Safety:  
Hearing: Hearing is good. Activities of Daily Living: The home contains: no safety equipment. Patient does total self care Ambulation: uses the walker all the time Fall Risk: 
Fall Risk Assessment, last 12 mths 10/21/2020 Able to walk? Yes Fall in past 12 months? Yes Fall with injury? No  
Number of falls in past 12 months 1 Fall Risk Score 1 Abuse Screen: 
Patient is not abused Cognitive Screening Has your family/caregiver stated any concerns about your memory: no 
  
Cognitive Screening: Normal - MMSE (Mini Mental Status Exam) Patient Care Team  
Patient Care Team: 
Saul Weiss MD as PCP - General (Internal Medicine) Saul Weiss MD as PCP - [facility] Belinda Granger Provider Assessment/Plan Education and counseling provided: 
Are appropriate based on today's review and evaluation Diagnoses and all orders for this visit: 1. Essential hypertension, benign 2. Hyperlipidemia LDL goal <100 
 
3. Ulcerative colitis with complication, unspecified location ([facility] [address] 4. Transient cerebral ischemia, unspecified type Health Maintenance Due Topic Date Due  Shingrix Vaccine Age 50> (1 of 2) 12/26/1980  GLAUCOMA SCREENING Q2Y  07/14/2019  Flu Vaccine (1) 09/01/2020  Medicare Yearly Exam  10/16/2020

## 2020-10-21 NOTE — PATIENT INSTRUCTIONS
Medicare Wellness Visit, Male The best way to live healthy is to have a lifestyle where you eat a well-balanced diet, exercise regularly, limit alcohol use, and quit all forms of tobacco/nicotine, if applicable. Regular preventive services are another way to keep healthy. Preventive services (vaccines, screening tests, monitoring & exams) can help personalize your care plan, which helps you manage your own care. Screening tests can find health problems at the earliest stages, when they are easiest to treat. [facility] follows the current, evidence-based guidelines published by the [facility] ([facility]) when recommending preventive services for our patients. Because we follow these guidelines, sometimes recommendations change over time as research supports it. (For example, a prostate screening blood test is no longer routinely recommended for men with no symptoms). Of course, you and your doctor may decide to screen more often for some diseases, based on your risk and co-morbidities (chronic disease you are already diagnosed with). Preventive services for you include: - Medicare offers their members a free annual wellness visit, which is time for you and your primary care provider to discuss and plan for your preventive service needs. Take advantage of this benefit every year! 
-All adults over age 72 should receive the recommended pneumonia vaccines. Current USPSTF guidelines recommend a series of two vaccines for the best pneumonia protection.  
-All adults should have a flu vaccine yearly and tetanus vaccine every 10 years. 
-All adults age 48 and older should receive the shingles vaccines (series of two vaccines).       
-All adults age 38-68 who are overweight should have a diabetes screening test once every three years.  
-Other screening tests & preventive services for persons with diabetes include: an eye exam to screen for diabetic retinopathy, a kidney function test, a foot exam, and stricter control over your cholesterol.  
-Cardiovascular screening for adults with routine risk involves an electrocardiogram (ECG) at intervals determined by the provider.  
-Colorectal cancer screening should be done for adults age 54-65 with no increased risk factors for colorectal cancer. There are a number of acceptable methods of screening for this type of cancer. Each test has its own benefits and drawbacks. Discuss with your provider what is most appropriate for you during your annual wellness visit. The different tests include: colonoscopy (considered the best screening method), a fecal occult blood test, a fecal DNA test, and sigmoidoscopy. 
-All adults born between [facility] should be screened once for Hepatitis C. 
-An Abdominal Aortic Aneurysm (AAA) Screening is recommended for men age 73-68 who has ever smoked in their lifetime. Here is a list of your current Health Maintenance items (your personalized list of preventive services) with a due date: 
Health Maintenance Due Topic Date Due  Shingles Vaccine (1 of 2) 12/26/1980  Glaucoma Screening   07/14/2019  Yearly Flu Vaccine (1) 09/01/2020 [facility] Annual Well Visit  10/16/2020

## 2020-10-21 NOTE — PROGRESS NOTES
HISTORY OF PRESENT ILLNESS Jeral Epley is a 80 y.o. male. HPI Hypertension ROS: no TIA's, no chest pain on exertion, no dyspnea on exertion, no swelling of ankles. New concerns: BP in office today is 149/91. He has been monitoring his BP at home with good readings. PreDM: Diabetic Review of Systems - further diabetic ROS: no polyuria or polydipsia, no chest pain, dyspnea or TIA's, no numbness, tingling or pain in extremities. Other symptoms and concerns: Pt's last a1c was 6.2 on 05/04/2020. Followed by endocrinology, Dr. Lilly Rodriguez twice a year. Hyperlipidemia: 
Cardiovascular risk analysis - 80 y.o. male LDL goal is under 100. ROS: taking medications as instructed, no medication side effects noted, no TIA's, no chest pain on exertion, no dyspnea on exertion, no swelling of ankles. Tolerating meds, no myalgias or other side effects noted. New concerns: Pt's last LDL was 198 on 05/03/2019. His endocrinologist added Zetia. TIA: Stable and well managed with Plavix. UC: Pt reports this has been in remission for the past 5 yrs. He continues on sulfasalazine and folic acid. PMR: Followed by Dr. Nargis Nunez. He continues on a low dose of prednisone 1mg. He denies recent flares and states his medication has not been changed in the past 5 yrs. Recent fall: Pt reports he fell last week trying to pick something up. He fell backwards and landed against an ottoman. He notes he hurt his back and neck, but is feeling better today. He ambulates using a rollator. Pt notes he hasn't had a fall in 5 yrs other than this incident. Pt reports that his memory is okay. He does not read often and watches TV. Pt is not driving. Review of Systems All other systems reviewed and are negative. Physical Exam 
Vitals signs and nursing note reviewed. Constitutional:   
   Appearance: Normal appearance. He is normal weight. HENT:  
   Head: Normocephalic and atraumatic. Right Ear: Tympanic membrane, ear canal and external ear normal.  
   Left Ear: Tympanic membrane, ear canal and external ear normal.  
   Nose: Nose normal.  
   Mouth/Throat:  
   Mouth: Mucous membranes are dry. Pharynx: Oropharynx is clear. Neck: Musculoskeletal: Normal range of motion and neck supple. Cardiovascular:  
   Rate and Rhythm: Normal rate and regular rhythm. Pulses: Normal pulses. Heart sounds: Murmur present. Pulmonary:  
   Effort: Pulmonary effort is normal.  
   Breath sounds: Normal breath sounds. Abdominal:  
   General: Abdomen is flat. Palpations: Abdomen is soft. Musculoskeletal:  
   Comments: Ambulates with a rollator Skin: 
   General: Skin is warm and dry. Neurological:  
   General: No focal deficit present. Mental Status: He is alert and oriented to person, place, and time. Mental status is at baseline. Psychiatric:     
   Mood and Affect: Mood normal.     
   Behavior: Behavior normal.     
   Thought Content: Thought content normal.     
   Judgment: Judgment normal.  
 
 
 
ASSESSMENT and PLAN Diagnoses and all orders for this visit: 
 
1. Medicare annual wellness visit, subsequent 2. Essential hypertension, benign BP is at goal. I do not recommend any change in treatment.  
-     METABOLIC PANEL, COMPREHENSIVE; Future 3. Hyperlipidemia LDL goal <100 Presumed stable, will recheck labs today. Patient is tolerating medications with no myalgias. I do not recommend any change in treatment plan. -     HEMOGLOBIN A1C WITH EAG; Future -     LIPID PANEL; Future 4. Ulcerative colitis with complication, unspecified location ([facility] [address] Stable and well-managed. No change in medications. I will start to manage his UC medication. Explained that he will needs labs monitored twice a year. -     sulfaSALAzine (AZULFIDINE) 500 mg tablet; Take 1 Tab by mouth two (2) times a day. 2 tabs -     folic acid (FOLVITE) 1 mg tablet; Take 1 Tab by mouth daily. 
-     CBC W/O DIFF; Future 5. Transient cerebral ischemia, unspecified type Stable and well-managed on Plavix. No change in medications. 6. Needs flu shot 
-     FLU (FLUAD QUAD INFLUENZA VACCINE,QUAD,ADJUVANTED) 7. Prediabetes Followed by endocrinology, Dr. Jimy Wang. Stable and well-managed. No change in medications.  
-     HEMOGLOBIN A1C WITH EAG; Future 8. PMR (polymyalgia rheumatica) (HCC) Stable and well-managed. No change in medications. I will start to manage his PMR medication. Explained that he will needs labs monitored twice a year. Discussed following up with Dr. Juan Price if he has a flare up of symptoms. -     predniSONE (DELTASONE) 1 mg tablet; Take 1 Tab by mouth daily (with breakfast). -     C REACTIVE PROTEIN, QT; Future Lab results and schedule of future lab studies reviewed with patient. Reviewed diet, exercise and weight control. Written by Judy Brumfield, as dictated by Chun Vaughan MD.  
 
Current diagnosis and concerns discussed with pt at length. Understands risks and benefits or current treatment plan and medications and accepts the treatment and medication with any possible risks. Pt asks appropriate questions which were answered. Pt instructed to call with any concerns or problems.

## 2020-10-26 DIAGNOSIS — K51.919 ULCERATIVE COLITIS WITH COMPLICATION, UNSPECIFIED LOCATION (HCC): ICD-10-CM

## 2020-10-26 RX ORDER — SULFASALAZINE 500 MG/1
500 TABLET ORAL 2 TIMES DAILY
Qty: 180 TAB | Refills: 3 | Status: SHIPPED | OUTPATIENT
Start: 2020-10-26 | End: 2021-01-25 | Stop reason: SDUPTHER

## 2020-12-15 DIAGNOSIS — G45.9 TRANSIENT CEREBRAL ISCHEMIA, UNSPECIFIED TYPE: ICD-10-CM

## 2020-12-15 RX ORDER — CLOPIDOGREL BISULFATE 75 MG/1
TABLET ORAL
Qty: 90 TAB | Refills: 3 | Status: SHIPPED | OUTPATIENT
Start: 2020-12-15 | End: 2021-01-01

## 2021-01-01 ENCOUNTER — OFFICE VISIT (OUTPATIENT)
Dept: INTERNAL MEDICINE CLINIC | Age: 86
End: 2021-01-01
Attending: NURSE PRACTITIONER
Payer: MEDICARE

## 2021-01-01 ENCOUNTER — TELEPHONE (OUTPATIENT)
Dept: INTERNAL MEDICINE CLINIC | Age: 86
End: 2021-01-01

## 2021-01-01 ENCOUNTER — HOSPITAL ENCOUNTER (OUTPATIENT)
Dept: MRI IMAGING | Age: 86
Discharge: HOME | End: 2021-12-30
Attending: NURSE PRACTITIONER
Payer: MEDICARE

## 2021-01-01 ENCOUNTER — APPOINTMENT (OUTPATIENT)
Dept: CT IMAGING | Age: 86
DRG: 066 | End: 2021-01-01
Attending: EMERGENCY MEDICINE
Payer: MEDICARE

## 2021-01-01 ENCOUNTER — OFFICE VISIT (OUTPATIENT)
Dept: INTERNAL MEDICINE CLINIC | Age: 86
End: 2021-01-01
Payer: MEDICARE

## 2021-01-01 ENCOUNTER — HOSPITAL ENCOUNTER (INPATIENT)
Age: 86
LOS: 1 days | Discharge: HOME | DRG: 066 | End: 2021-12-31
Attending: EMERGENCY MEDICINE | Admitting: INTERNAL MEDICINE
Payer: MEDICARE

## 2021-01-01 ENCOUNTER — APPOINTMENT (OUTPATIENT)
Dept: NON INVASIVE DIAGNOSTICS | Age: 86
DRG: 066 | End: 2021-01-01
Attending: INTERNAL MEDICINE
Payer: MEDICARE

## 2021-01-01 VITALS
OXYGEN SATURATION: 95 % | RESPIRATION RATE: 19 BRPM | DIASTOLIC BLOOD PRESSURE: 78 MMHG | SYSTOLIC BLOOD PRESSURE: 146 MMHG | WEIGHT: 186 LBS | TEMPERATURE: 98 F | HEIGHT: 68 IN | HEART RATE: 97 BPM | BODY MASS INDEX: 28.19 KG/M2

## 2021-01-01 VITALS
WEIGHT: 188.6 LBS | RESPIRATION RATE: 16 BRPM | BODY MASS INDEX: 28.58 KG/M2 | TEMPERATURE: 97.6 F | HEART RATE: 87 BPM | OXYGEN SATURATION: 97 % | DIASTOLIC BLOOD PRESSURE: 80 MMHG | HEIGHT: 68 IN | SYSTOLIC BLOOD PRESSURE: 135 MMHG

## 2021-01-01 VITALS
TEMPERATURE: 97.9 F | OXYGEN SATURATION: 97 % | SYSTOLIC BLOOD PRESSURE: 145 MMHG | HEART RATE: 95 BPM | DIASTOLIC BLOOD PRESSURE: 82 MMHG | RESPIRATION RATE: 16 BRPM | WEIGHT: 189.8 LBS | HEIGHT: 68 IN | BODY MASS INDEX: 28.76 KG/M2

## 2021-01-01 VITALS
RESPIRATION RATE: 16 BRPM | HEART RATE: 99 BPM | SYSTOLIC BLOOD PRESSURE: 156 MMHG | BODY MASS INDEX: 28.1 KG/M2 | WEIGHT: 185.4 LBS | HEIGHT: 68 IN | TEMPERATURE: 98.3 F | DIASTOLIC BLOOD PRESSURE: 90 MMHG | OXYGEN SATURATION: 95 %

## 2021-01-01 DIAGNOSIS — G60.0 CHARCOT-MARIE-TOOTH DISEASE: ICD-10-CM

## 2021-01-01 DIAGNOSIS — G45.9 TRANSIENT CEREBRAL ISCHEMIA, UNSPECIFIED TYPE: ICD-10-CM

## 2021-01-01 DIAGNOSIS — R47.01 APHASIA DUE TO ACUTE STROKE (HCC): ICD-10-CM

## 2021-01-01 DIAGNOSIS — M35.3 PMR (POLYMYALGIA RHEUMATICA) (HCC): ICD-10-CM

## 2021-01-01 DIAGNOSIS — K51.919 ULCERATIVE COLITIS WITH COMPLICATION, UNSPECIFIED LOCATION (HCC): ICD-10-CM

## 2021-01-01 DIAGNOSIS — I63.9 APHASIA DUE TO ACUTE STROKE (HCC): ICD-10-CM

## 2021-01-01 DIAGNOSIS — E78.5 HYPERLIPIDEMIA LDL GOAL <100: ICD-10-CM

## 2021-01-01 DIAGNOSIS — R47.81 SLURRING OF SPEECH: ICD-10-CM

## 2021-01-01 DIAGNOSIS — G89.29 CHRONIC PAIN OF BOTH KNEES: ICD-10-CM

## 2021-01-01 DIAGNOSIS — N18.32 STAGE 3B CHRONIC KIDNEY DISEASE (HCC): ICD-10-CM

## 2021-01-01 DIAGNOSIS — M25.561 CHRONIC PAIN OF BOTH KNEES: ICD-10-CM

## 2021-01-01 DIAGNOSIS — Z23 NEEDS FLU SHOT: ICD-10-CM

## 2021-01-01 DIAGNOSIS — R73.03 PREDIABETES: ICD-10-CM

## 2021-01-01 DIAGNOSIS — M25.562 CHRONIC PAIN OF BOTH KNEES: ICD-10-CM

## 2021-01-01 DIAGNOSIS — Z01.818 PREOP GENERAL PHYSICAL EXAM: ICD-10-CM

## 2021-01-01 DIAGNOSIS — Z00.00 MEDICARE ANNUAL WELLNESS VISIT, SUBSEQUENT: Primary | ICD-10-CM

## 2021-01-01 DIAGNOSIS — G45.9 TRANSIENT ISCHEMIC ATTACK (TIA): ICD-10-CM

## 2021-01-01 DIAGNOSIS — I63.9 CEREBROVASCULAR ACCIDENT (CVA), UNSPECIFIED MECHANISM (HCC): Primary | ICD-10-CM

## 2021-01-01 DIAGNOSIS — K51.919 ULCERATIVE COLITIS WITH COMPLICATION, UNSPECIFIED LOCATION (HCC): Primary | ICD-10-CM

## 2021-01-01 DIAGNOSIS — N28.9 DECREASED RENAL FUNCTION: ICD-10-CM

## 2021-01-01 DIAGNOSIS — I21.4 NSTEMI (NON-ST ELEVATED MYOCARDIAL INFARCTION) (HCC): ICD-10-CM

## 2021-01-01 DIAGNOSIS — H25.9 SENILE CATARACT OF LEFT EYE, UNSPECIFIED AGE-RELATED CATARACT TYPE: Primary | ICD-10-CM

## 2021-01-01 DIAGNOSIS — D64.9 ANEMIA, UNSPECIFIED TYPE: ICD-10-CM

## 2021-01-01 DIAGNOSIS — I10 ESSENTIAL HYPERTENSION, BENIGN: ICD-10-CM

## 2021-01-01 LAB
ALBUMIN SERPL-MCNC: 3.4 G/DL (ref 3.5–5)
ALBUMIN SERPL-MCNC: 3.6 G/DL (ref 3.5–5)
ALBUMIN/GLOB SERPL: 0.8 {RATIO} (ref 1.1–2.2)
ALBUMIN/GLOB SERPL: 0.9 {RATIO} (ref 1.1–2.2)
ALP SERPL-CCNC: 52 U/L (ref 45–117)
ALP SERPL-CCNC: 62 U/L (ref 45–117)
ALT SERPL-CCNC: 11 U/L (ref 12–78)
ALT SERPL-CCNC: 11 U/L (ref 12–78)
ANION GAP SERPL CALC-SCNC: 4 MMOL/L (ref 5–15)
ANION GAP SERPL CALC-SCNC: 5 MMOL/L (ref 5–15)
ANION GAP SERPL CALC-SCNC: 9 MMOL/L (ref 5–15)
APPEARANCE UR: CLEAR
APTT PPP: 24.5 SEC (ref 22.1–31)
AST SERPL-CCNC: 17 U/L (ref 15–37)
AST SERPL-CCNC: 19 U/L (ref 15–37)
ATRIAL RATE: 82 BPM
BACTERIA URNS QL MICRO: NEGATIVE /HPF
BASOPHILS # BLD: 0 K/UL (ref 0–0.1)
BASOPHILS # BLD: 0.1 K/UL (ref 0–0.1)
BASOPHILS NFR BLD: 0 % (ref 0–1)
BASOPHILS NFR BLD: 1 % (ref 0–1)
BILIRUB SERPL-MCNC: 0.3 MG/DL (ref 0.2–1)
BILIRUB SERPL-MCNC: 0.3 MG/DL (ref 0.2–1)
BILIRUB UR QL: NEGATIVE
BUN SERPL-MCNC: 26 MG/DL (ref 6–20)
BUN SERPL-MCNC: 26 MG/DL (ref 6–20)
BUN SERPL-MCNC: 30 MG/DL (ref 10–36)
BUN SERPL-MCNC: 30 MG/DL (ref 6–20)
BUN/CREAT SERPL: 14 (ref 12–20)
BUN/CREAT SERPL: 17 (ref 10–24)
BUN/CREAT SERPL: 17 (ref 12–20)
BUN/CREAT SERPL: 20 (ref 12–20)
CALCIUM SERPL-MCNC: 7.7 MG/DL (ref 8.5–10.1)
CALCIUM SERPL-MCNC: 9 MG/DL (ref 8.5–10.1)
CALCIUM SERPL-MCNC: 9.2 MG/DL (ref 8.5–10.1)
CALCIUM SERPL-MCNC: 9.6 MG/DL (ref 8.6–10.2)
CALCULATED P AXIS, ECG09: 104 DEGREES
CALCULATED T AXIS, ECG11: 18 DEGREES
CHLORIDE SERPL-SCNC: 100 MMOL/L (ref 96–106)
CHLORIDE SERPL-SCNC: 103 MMOL/L (ref 97–108)
CHLORIDE SERPL-SCNC: 103 MMOL/L (ref 97–108)
CHLORIDE SERPL-SCNC: 109 MMOL/L (ref 97–108)
CHOLEST SERPL-MCNC: 269 MG/DL
CHOLEST SERPL-MCNC: 281 MG/DL
CO2 SERPL-SCNC: 25 MMOL/L (ref 20–29)
CO2 SERPL-SCNC: 26 MMOL/L (ref 21–32)
CO2 SERPL-SCNC: 27 MMOL/L (ref 21–32)
CO2 SERPL-SCNC: 30 MMOL/L (ref 21–32)
COLOR UR: ABNORMAL
COMMENT, HOLDF: NORMAL
COMMENT, HOLDF: NORMAL
CREAT SERPL-MCNC: 1.32 MG/DL (ref 0.7–1.3)
CREAT SERPL-MCNC: 1.74 MG/DL (ref 0.76–1.27)
CREAT SERPL-MCNC: 1.75 MG/DL (ref 0.7–1.3)
CREAT SERPL-MCNC: 1.89 MG/DL (ref 0.7–1.3)
CREAT UR-MCNC: 67 MG/DL
DIAGNOSIS, 93000: NORMAL
DIFFERENTIAL METHOD BLD: ABNORMAL
DIFFERENTIAL METHOD BLD: ABNORMAL
ECHO AR MAX VEL PISA: 2.9 M/S
ECHO AV AREA PEAK VELOCITY: 0.7 CM2
ECHO AV AREA PEAK VELOCITY: 0.7 CM2
ECHO AV AREA VTI: 0.7 CM2
ECHO AV AREA VTI: 0.7 CM2
ECHO AV MEAN GRADIENT: 47 MMHG
ECHO AV MEAN VELOCITY: 3.3 M/S
ECHO AV PEAK GRADIENT: 68 MMHG
ECHO AV PEAK VELOCITY: 4.1 M/S
ECHO AV REGURGITANT PHT: 756.3 MILLISECOND
ECHO AV VELOCITY RATIO: 0.2
ECHO AV VTI: 85.4 CM
ECHO EST RA PRESSURE: 8 MMHG
ECHO LA DIAMETER INDEX: 1.82 CM/M2
ECHO LA DIAMETER: 3.6 CM
ECHO LA VOL 2C: 66 ML (ref 18–58)
ECHO LA VOL 4C: 52 ML (ref 18–58)
ECHO LA VOL BP: 64 ML (ref 18–58)
ECHO LA VOL BP: 64 ML (ref 18–58)
ECHO LA VOLUME AREA LENGTH: 68 ML
ECHO LA VOLUME INDEX A2C: 33 ML/M2 (ref 16–34)
ECHO LA VOLUME INDEX A4C: 26 ML/M2 (ref 16–34)
ECHO LA VOLUME INDEX AREA LENGTH: 34 ML/M2 (ref 16–34)
ECHO LV E' LATERAL VELOCITY: 7 CM/S
ECHO LV E' SEPTAL VELOCITY: 9 CM/S
ECHO LV FRACTIONAL SHORTENING: 32 % (ref 28–44)
ECHO LV INTERNAL DIMENSION DIASTOLE INDEX: 2.68 CM/M2
ECHO LV INTERNAL DIMENSION DIASTOLIC: 5.3 CM (ref 4.2–5.9)
ECHO LV INTERNAL DIMENSION SYSTOLIC INDEX: 1.82 CM/M2
ECHO LV INTERNAL DIMENSION SYSTOLIC: 3.6 CM
ECHO LV IVSD: 0.9 CM (ref 0.6–1)
ECHO LV MASS 2D: 187.3 G (ref 88–224)
ECHO LV MASS INDEX 2D: 94.6 G/M2 (ref 49–115)
ECHO LV POSTERIOR WALL DIASTOLIC: 1 CM (ref 0.6–1)
ECHO LV RELATIVE WALL THICKNESS RATIO: 0.38
ECHO LVOT AREA: 3.5 CM2
ECHO LVOT AV VTI INDEX: 0.2
ECHO LVOT DIAM: 2.1 CM
ECHO LVOT MEAN GRADIENT: 1 MMHG
ECHO LVOT PEAK GRADIENT: 2 MMHG
ECHO LVOT PEAK VELOCITY: 0.8 M/S
ECHO LVOT STROKE VOLUME INDEX: 29.2 ML/M2
ECHO LVOT SV: 57.8 ML
ECHO LVOT VTI: 16.7 CM
ECHO MV A VELOCITY: 1.07 M/S
ECHO MV E DECELERATION TIME (DT): 270.9 MS
ECHO MV E VELOCITY: 0.56 M/S
ECHO MV E/A RATIO: 0.52
ECHO MV E/E' LATERAL: 8
ECHO MV E/E' RATIO (AVERAGED): 7.11
ECHO MV E/E' SEPTAL: 6.22
ECHO PULMONARY ARTERY END DIASTOLIC PRESSURE: 3 MMHG
ECHO PV REGURGITANT MAX VELOCITY: 0.8 M/S
ECHO RIGHT VENTRICULAR SYSTOLIC PRESSURE (RVSP): 33 MMHG
ECHO RV FREE WALL PEAK S': 15 CM/S
ECHO RV INTERNAL DIMENSION: 3.4 CM
ECHO RV TAPSE: 2.7 CM (ref 1.5–2)
ECHO RVOT PEAK GRADIENT: 2 MMHG
ECHO RVOT PEAK VELOCITY: 0.6 M/S
ECHO TV REGURGITANT MAX VELOCITY: 2.52 M/S
ECHO TV REGURGITANT PEAK GRADIENT: 26 MMHG
EOSINOPHIL # BLD: 0.1 K/UL (ref 0–0.4)
EOSINOPHIL # BLD: 1.1 K/UL (ref 0–0.4)
EOSINOPHIL NFR BLD: 1 % (ref 0–7)
EOSINOPHIL NFR BLD: 12 % (ref 0–7)
EPITH CASTS URNS QL MICRO: ABNORMAL /LPF
ERYTHROCYTE [DISTWIDTH] IN BLOOD BY AUTOMATED COUNT: 12.7 % (ref 11.5–14.5)
ERYTHROCYTE [DISTWIDTH] IN BLOOD BY AUTOMATED COUNT: 12.7 % (ref 11.5–14.5)
ERYTHROCYTE [DISTWIDTH] IN BLOOD BY AUTOMATED COUNT: 12.9 % (ref 11.5–14.5)
EST. AVERAGE GLUCOSE BLD GHB EST-MCNC: 114 MG/DL
EST. AVERAGE GLUCOSE BLD GHB EST-MCNC: 134 MG/DL
GLOBULIN SER CALC-MCNC: 3.9 G/DL (ref 2–4)
GLOBULIN SER CALC-MCNC: 4.1 G/DL (ref 2–4)
GLUCOSE BLD STRIP.AUTO-MCNC: 124 MG/DL (ref 65–117)
GLUCOSE BLD STRIP.AUTO-MCNC: 142 MG/DL (ref 65–117)
GLUCOSE BLD STRIP.AUTO-MCNC: 149 MG/DL (ref 65–117)
GLUCOSE SERPL-MCNC: 111 MG/DL (ref 65–100)
GLUCOSE SERPL-MCNC: 120 MG/DL (ref 65–100)
GLUCOSE SERPL-MCNC: 125 MG/DL (ref 65–99)
GLUCOSE SERPL-MCNC: 170 MG/DL (ref 65–100)
GLUCOSE UR STRIP.AUTO-MCNC: NEGATIVE MG/DL
HBA1C MFR BLD: 5.6 % (ref 4–5.6)
HBA1C MFR BLD: 6.3 % (ref 4–5.6)
HCT VFR BLD AUTO: 28.4 % (ref 36.6–50.3)
HCT VFR BLD AUTO: 30.5 % (ref 36.6–50.3)
HCT VFR BLD AUTO: 34.1 % (ref 36.6–50.3)
HDLC SERPL-MCNC: 39 MG/DL
HDLC SERPL-MCNC: 43 MG/DL
HDLC SERPL: 6.5 {RATIO} (ref 0–5)
HDLC SERPL: 6.9 {RATIO} (ref 0–5)
HGB BLD-MCNC: 10.7 G/DL (ref 12.1–17)
HGB BLD-MCNC: 9.1 G/DL (ref 12.1–17)
HGB BLD-MCNC: 9.9 G/DL (ref 12.1–17)
HGB UR QL STRIP: NEGATIVE
HYALINE CASTS URNS QL MICRO: ABNORMAL /LPF (ref 0–5)
IMM GRANULOCYTES # BLD AUTO: 0 K/UL
IMM GRANULOCYTES # BLD AUTO: 0 K/UL (ref 0–0.04)
IMM GRANULOCYTES NFR BLD AUTO: 0 %
IMM GRANULOCYTES NFR BLD AUTO: 0 % (ref 0–0.5)
INR PPP: 1 (ref 0.9–1.1)
INTERPRETATION: NORMAL
KETONES UR QL STRIP.AUTO: NEGATIVE MG/DL
LDLC SERPL CALC-MCNC: 172.8 MG/DL (ref 0–100)
LDLC SERPL CALC-MCNC: 184.8 MG/DL (ref 0–100)
LEUKOCYTE ESTERASE UR QL STRIP.AUTO: NEGATIVE
LYMPHOCYTES # BLD: 0.7 K/UL (ref 0.8–3.5)
LYMPHOCYTES # BLD: 2.5 K/UL (ref 0.8–3.5)
LYMPHOCYTES NFR BLD: 28 % (ref 12–49)
LYMPHOCYTES NFR BLD: 8 % (ref 12–49)
MAGNESIUM SERPL-MCNC: 1.6 MG/DL (ref 1.6–2.4)
MCH RBC QN AUTO: 32.6 PG (ref 26–34)
MCH RBC QN AUTO: 32.9 PG (ref 26–34)
MCH RBC QN AUTO: 33.1 PG (ref 26–34)
MCHC RBC AUTO-ENTMCNC: 31.4 G/DL (ref 30–36.5)
MCHC RBC AUTO-ENTMCNC: 32 G/DL (ref 30–36.5)
MCHC RBC AUTO-ENTMCNC: 32.5 G/DL (ref 30–36.5)
MCV RBC AUTO: 102 FL (ref 80–99)
MCV RBC AUTO: 102.5 FL (ref 80–99)
MCV RBC AUTO: 104 FL (ref 80–99)
MONOCYTES # BLD: 0 K/UL (ref 0–1)
MONOCYTES # BLD: 0.7 K/UL (ref 0–1)
MONOCYTES NFR BLD: 0 % (ref 5–13)
MONOCYTES NFR BLD: 8 % (ref 5–13)
NEUTS BAND NFR BLD MANUAL: 51 % (ref 0–6)
NEUTS SEG # BLD: 4.4 K/UL (ref 1.8–8)
NEUTS SEG # BLD: 7.5 K/UL (ref 1.8–8)
NEUTS SEG NFR BLD: 34 % (ref 32–75)
NEUTS SEG NFR BLD: 51 % (ref 32–75)
NITRITE UR QL STRIP.AUTO: NEGATIVE
NRBC # BLD: 0 K/UL (ref 0–0.01)
NRBC BLD-RTO: 0 PER 100 WBC
P-R INTERVAL, ECG05: 164 MS
PH UR STRIP: 5.5 [PH] (ref 5–8)
PLATELET # BLD AUTO: 256 K/UL (ref 150–400)
PLATELET # BLD AUTO: 270 K/UL (ref 150–400)
PLATELET # BLD AUTO: 320 K/UL (ref 150–400)
PMV BLD AUTO: 10.4 FL (ref 8.9–12.9)
PMV BLD AUTO: 9.4 FL (ref 8.9–12.9)
PMV BLD AUTO: 9.6 FL (ref 8.9–12.9)
POTASSIUM SERPL-SCNC: 3.3 MMOL/L (ref 3.5–5.1)
POTASSIUM SERPL-SCNC: 4.3 MMOL/L (ref 3.5–5.1)
POTASSIUM SERPL-SCNC: 4.6 MMOL/L (ref 3.5–5.1)
POTASSIUM SERPL-SCNC: 4.6 MMOL/L (ref 3.5–5.2)
PROMYELOCYTES NFR BLD MANUAL: 6 %
PROT SERPL-MCNC: 7.5 G/DL (ref 6.4–8.2)
PROT SERPL-MCNC: 7.5 G/DL (ref 6.4–8.2)
PROT UR STRIP-MCNC: ABNORMAL MG/DL
PROTHROMBIN TIME: 10.3 SEC (ref 9–11.1)
Q-T INTERVAL, ECG07: 332 MS
QRS DURATION, ECG06: 68 MS
QTC CALCULATION (BEZET), ECG08: 387 MS
RBC # BLD AUTO: 2.77 M/UL (ref 4.1–5.7)
RBC # BLD AUTO: 2.99 M/UL (ref 4.1–5.7)
RBC # BLD AUTO: 3.28 M/UL (ref 4.1–5.7)
RBC #/AREA URNS HPF: ABNORMAL /HPF (ref 0–5)
RBC MORPH BLD: ABNORMAL
RBC MORPH BLD: ABNORMAL
SAMPLES BEING HELD,HOLD: NORMAL
SAMPLES BEING HELD,HOLD: NORMAL
SERVICE CMNT-IMP: ABNORMAL
SODIUM SERPL-SCNC: 137 MMOL/L (ref 136–145)
SODIUM SERPL-SCNC: 139 MMOL/L (ref 136–145)
SODIUM SERPL-SCNC: 140 MMOL/L (ref 134–144)
SODIUM SERPL-SCNC: 140 MMOL/L (ref 136–145)
SODIUM UR-SCNC: 98 MMOL/L
SP GR UR REFRACTOMETRY: >1.03 (ref 1–1.03)
THERAPEUTIC RANGE,PTTT: NORMAL SECS (ref 58–77)
TRIGL SERPL-MCNC: 266 MG/DL (ref ?–150)
TRIGL SERPL-MCNC: 286 MG/DL (ref ?–150)
TROPONIN-HIGH SENSITIVITY: 1307 NG/L (ref 0–76)
TROPONIN-HIGH SENSITIVITY: 1316 NG/L (ref 0–76)
TROPONIN-HIGH SENSITIVITY: 1431 NG/L (ref 0–76)
TROPONIN-HIGH SENSITIVITY: 1470 NG/L (ref 0–76)
UA: UC IF INDICATED,UAUC: ABNORMAL
UROBILINOGEN UR QL STRIP.AUTO: 0.2 EU/DL (ref 0.2–1)
VENTRICULAR RATE, ECG03: 82 BPM
VLDLC SERPL CALC-MCNC: 53.2 MG/DL
VLDLC SERPL CALC-MCNC: 57.2 MG/DL
WBC # BLD AUTO: 8.8 K/UL (ref 4.1–11.1)
WBC # BLD AUTO: 8.8 K/UL (ref 4.1–11.1)
WBC # BLD AUTO: 9.9 K/UL (ref 4.1–11.1)
WBC URNS QL MICRO: ABNORMAL /HPF (ref 0–4)

## 2021-01-01 PROCEDURE — 99215 OFFICE O/P EST HI 40 MIN: CPT | Performed by: NURSE PRACTITIONER

## 2021-01-01 PROCEDURE — 93005 ELECTROCARDIOGRAM TRACING: CPT

## 2021-01-01 PROCEDURE — G8419 CALC BMI OUT NRM PARAM NOF/U: HCPCS | Performed by: INTERNAL MEDICINE

## 2021-01-01 PROCEDURE — 65660000000 HC RM CCU STEPDOWN

## 2021-01-01 PROCEDURE — 3288F FALL RISK ASSESSMENT DOCD: CPT | Performed by: INTERNAL MEDICINE

## 2021-01-01 PROCEDURE — 85730 THROMBOPLASTIN TIME PARTIAL: CPT

## 2021-01-01 PROCEDURE — 70496 CT ANGIOGRAPHY HEAD: CPT

## 2021-01-01 PROCEDURE — 80061 LIPID PANEL: CPT

## 2021-01-01 PROCEDURE — G8536 NO DOC ELDER MAL SCRN: HCPCS | Performed by: INTERNAL MEDICINE

## 2021-01-01 PROCEDURE — 84484 ASSAY OF TROPONIN QUANT: CPT

## 2021-01-01 PROCEDURE — G8427 DOCREV CUR MEDS BY ELIG CLIN: HCPCS | Performed by: INTERNAL MEDICINE

## 2021-01-01 PROCEDURE — 36415 COLL VENOUS BLD VENIPUNCTURE: CPT

## 2021-01-01 PROCEDURE — G0439 PPPS, SUBSEQ VISIT: HCPCS | Performed by: INTERNAL MEDICINE

## 2021-01-01 PROCEDURE — 99223 1ST HOSP IP/OBS HIGH 75: CPT | Performed by: PSYCHIATRY & NEUROLOGY

## 2021-01-01 PROCEDURE — G8510 SCR DEP NEG, NO PLAN REQD: HCPCS | Performed by: INTERNAL MEDICINE

## 2021-01-01 PROCEDURE — 1100F PTFALLS ASSESS-DOCD GE2>/YR: CPT | Performed by: INTERNAL MEDICINE

## 2021-01-01 PROCEDURE — 74011250637 HC RX REV CODE- 250/637: Performed by: INTERNAL MEDICINE

## 2021-01-01 PROCEDURE — 99214 OFFICE O/P EST MOD 30 MIN: CPT | Performed by: INTERNAL MEDICINE

## 2021-01-01 PROCEDURE — 97165 OT EVAL LOW COMPLEX 30 MIN: CPT

## 2021-01-01 PROCEDURE — 92610 EVALUATE SWALLOWING FUNCTION: CPT

## 2021-01-01 PROCEDURE — 80048 BASIC METABOLIC PNL TOTAL CA: CPT

## 2021-01-01 PROCEDURE — 80053 COMPREHEN METABOLIC PANEL: CPT

## 2021-01-01 PROCEDURE — 84300 ASSAY OF URINE SODIUM: CPT

## 2021-01-01 PROCEDURE — 97530 THERAPEUTIC ACTIVITIES: CPT

## 2021-01-01 PROCEDURE — 74011250636 HC RX REV CODE- 250/636: Performed by: INTERNAL MEDICINE

## 2021-01-01 PROCEDURE — 93306 TTE W/DOPPLER COMPLETE: CPT | Performed by: SPECIALIST

## 2021-01-01 PROCEDURE — 70551 MRI BRAIN STEM W/O DYE: CPT

## 2021-01-01 PROCEDURE — 74011636637 HC RX REV CODE- 636/637: Performed by: INTERNAL MEDICINE

## 2021-01-01 PROCEDURE — 82962 GLUCOSE BLOOD TEST: CPT

## 2021-01-01 PROCEDURE — G8419 CALC BMI OUT NRM PARAM NOF/U: HCPCS | Performed by: NURSE PRACTITIONER

## 2021-01-01 PROCEDURE — 74011000250 HC RX REV CODE- 250: Performed by: INTERNAL MEDICINE

## 2021-01-01 PROCEDURE — 83735 ASSAY OF MAGNESIUM: CPT

## 2021-01-01 PROCEDURE — 74011000636 HC RX REV CODE- 636: Performed by: RADIOLOGY

## 2021-01-01 PROCEDURE — 85610 PROTHROMBIN TIME: CPT

## 2021-01-01 PROCEDURE — 82570 ASSAY OF URINE CREATININE: CPT

## 2021-01-01 PROCEDURE — 83036 HEMOGLOBIN GLYCOSYLATED A1C: CPT

## 2021-01-01 PROCEDURE — G8427 DOCREV CUR MEDS BY ELIG CLIN: HCPCS | Performed by: NURSE PRACTITIONER

## 2021-01-01 PROCEDURE — 1101F PT FALLS ASSESS-DOCD LE1/YR: CPT | Performed by: NURSE PRACTITIONER

## 2021-01-01 PROCEDURE — G8432 DEP SCR NOT DOC, RNG: HCPCS | Performed by: INTERNAL MEDICINE

## 2021-01-01 PROCEDURE — G0008 ADMIN INFLUENZA VIRUS VAC: HCPCS | Performed by: INTERNAL MEDICINE

## 2021-01-01 PROCEDURE — G8432 DEP SCR NOT DOC, RNG: HCPCS | Performed by: NURSE PRACTITIONER

## 2021-01-01 PROCEDURE — 99285 EMERGENCY DEPT VISIT HI MDM: CPT

## 2021-01-01 PROCEDURE — C8929 TTE W OR WO FOL WCON,DOPPLER: HCPCS

## 2021-01-01 PROCEDURE — 85025 COMPLETE CBC W/AUTO DIFF WBC: CPT

## 2021-01-01 PROCEDURE — 97161 PT EVAL LOW COMPLEX 20 MIN: CPT

## 2021-01-01 PROCEDURE — G8536 NO DOC ELDER MAL SCRN: HCPCS | Performed by: NURSE PRACTITIONER

## 2021-01-01 PROCEDURE — 92526 ORAL FUNCTION THERAPY: CPT

## 2021-01-01 PROCEDURE — 81001 URINALYSIS AUTO W/SCOPE: CPT

## 2021-01-01 PROCEDURE — 90694 VACC AIIV4 NO PRSRV 0.5ML IM: CPT | Performed by: INTERNAL MEDICINE

## 2021-01-01 PROCEDURE — 97535 SELF CARE MNGMENT TRAINING: CPT

## 2021-01-01 RX ORDER — MELATONIN
1000 DAILY
Status: DISCONTINUED | OUTPATIENT
Start: 2021-01-01 | End: 2021-01-01 | Stop reason: HOSPADM

## 2021-01-01 RX ORDER — DEXTROMETHORPHAN HYDROBROMIDE, GUAIFENESIN 5; 100 MG/5ML; MG/5ML
650 LIQUID ORAL
COMMUNITY

## 2021-01-01 RX ORDER — VIT A/VIT C/VIT E/ZINC/COPPER 4296-226
1 CAPSULE ORAL 2 TIMES DAILY
COMMUNITY

## 2021-01-01 RX ORDER — MELATONIN
1000 DAILY
COMMUNITY

## 2021-01-01 RX ORDER — CLOPIDOGREL BISULFATE 75 MG/1
TABLET ORAL
Qty: 90 TABLET | Refills: 3 | Status: SHIPPED | OUTPATIENT
Start: 2021-01-01

## 2021-01-01 RX ORDER — CELECOXIB 200 MG/1
200 CAPSULE ORAL 2 TIMES DAILY
Qty: 60 CAP | Refills: 0 | Status: SHIPPED | OUTPATIENT
Start: 2021-01-01 | End: 2021-01-01 | Stop reason: ALTCHOICE

## 2021-01-01 RX ORDER — CLOPIDOGREL BISULFATE 75 MG/1
75 TABLET ORAL DAILY
Status: DISCONTINUED | OUTPATIENT
Start: 2021-01-01 | End: 2021-01-01 | Stop reason: HOSPADM

## 2021-01-01 RX ORDER — LANOLIN ALCOHOL/MO/W.PET/CERES
1000 CREAM (GRAM) TOPICAL DAILY
Status: DISCONTINUED | OUTPATIENT
Start: 2021-01-01 | End: 2021-01-01 | Stop reason: HOSPADM

## 2021-01-01 RX ORDER — GUAIFENESIN 100 MG/5ML
81 LIQUID (ML) ORAL DAILY
Status: DISCONTINUED | OUTPATIENT
Start: 2021-01-01 | End: 2021-01-01

## 2021-01-01 RX ORDER — SODIUM CHLORIDE, SODIUM LACTATE, POTASSIUM CHLORIDE, CALCIUM CHLORIDE 600; 310; 30; 20 MG/100ML; MG/100ML; MG/100ML; MG/100ML
75 INJECTION, SOLUTION INTRAVENOUS CONTINUOUS
Status: DISCONTINUED | OUTPATIENT
Start: 2021-01-01 | End: 2021-01-01

## 2021-01-01 RX ORDER — FUROSEMIDE 20 MG/1
20 TABLET ORAL 2 TIMES DAILY
Status: DISCONTINUED | OUTPATIENT
Start: 2021-01-01 | End: 2021-01-01

## 2021-01-01 RX ORDER — SULFASALAZINE 500 MG/1
1000 TABLET ORAL 2 TIMES DAILY
Qty: 360 TAB | Refills: 0 | Status: SHIPPED | OUTPATIENT
Start: 2021-01-01 | End: 2021-01-01 | Stop reason: SDUPTHER

## 2021-01-01 RX ORDER — DEXTROSE 50 % IN WATER (D50W) INTRAVENOUS SYRINGE
12.5-25 AS NEEDED
Status: DISCONTINUED | OUTPATIENT
Start: 2021-01-01 | End: 2021-01-01 | Stop reason: HOSPADM

## 2021-01-01 RX ORDER — GUAIFENESIN 100 MG/5ML
81 LIQUID (ML) ORAL DAILY
Qty: 30 TABLET | Refills: 0 | Status: SHIPPED | OUTPATIENT
Start: 2022-01-01

## 2021-01-01 RX ORDER — FOLIC ACID 1 MG/1
TABLET ORAL
Qty: 90 TABLET | Refills: 3 | Status: SHIPPED | OUTPATIENT
Start: 2021-01-01

## 2021-01-01 RX ORDER — PREDNISONE 1 MG/1
1 TABLET ORAL
Qty: 90 TABLET | Refills: 3 | Status: SHIPPED | OUTPATIENT
Start: 2021-01-01

## 2021-01-01 RX ORDER — PREDNISONE 1 MG/1
1 TABLET ORAL
Status: DISCONTINUED | OUTPATIENT
Start: 2021-01-01 | End: 2021-01-01 | Stop reason: HOSPADM

## 2021-01-01 RX ORDER — MESALAMINE 800 MG/1
1600 TABLET, DELAYED RELEASE ORAL 2 TIMES DAILY
Qty: 120 TAB | Refills: 1 | Status: SHIPPED | OUTPATIENT
Start: 2021-01-01 | End: 2021-01-01

## 2021-01-01 RX ORDER — MAGNESIUM SULFATE 100 %
4 CRYSTALS MISCELLANEOUS AS NEEDED
Status: DISCONTINUED | OUTPATIENT
Start: 2021-01-01 | End: 2021-01-01 | Stop reason: HOSPADM

## 2021-01-01 RX ORDER — MAGNESIUM SULFATE HEPTAHYDRATE 40 MG/ML
2 INJECTION, SOLUTION INTRAVENOUS ONCE
Status: COMPLETED | OUTPATIENT
Start: 2021-01-01 | End: 2021-01-01

## 2021-01-01 RX ORDER — CHOLECALCIFEROL (VITAMIN D3) 50 MCG
CAPSULE ORAL DAILY
COMMUNITY
End: 2021-01-01

## 2021-01-01 RX ORDER — DORZOLAMIDE HYDROCHLORIDE AND TIMOLOL MALEATE 20; 5 MG/ML; MG/ML
1 SOLUTION/ DROPS OPHTHALMIC 2 TIMES DAILY
COMMUNITY
End: 2022-01-01

## 2021-01-01 RX ORDER — LISINOPRIL 20 MG/1
40 TABLET ORAL DAILY
Status: DISCONTINUED | OUTPATIENT
Start: 2021-01-01 | End: 2021-01-01 | Stop reason: HOSPADM

## 2021-01-01 RX ORDER — POTASSIUM CHLORIDE 750 MG/1
40 TABLET, FILM COATED, EXTENDED RELEASE ORAL
Status: COMPLETED | OUTPATIENT
Start: 2021-01-01 | End: 2021-01-01

## 2021-01-01 RX ORDER — FOLIC ACID 1 MG/1
1 TABLET ORAL DAILY
Status: DISCONTINUED | OUTPATIENT
Start: 2021-01-01 | End: 2021-01-01 | Stop reason: HOSPADM

## 2021-01-01 RX ORDER — SULFASALAZINE 500 MG/1
1000 TABLET ORAL 2 TIMES DAILY WITH MEALS
Status: DISCONTINUED | OUTPATIENT
Start: 2021-01-01 | End: 2021-01-01 | Stop reason: HOSPADM

## 2021-01-01 RX ORDER — ACETAMINOPHEN 650 MG/1
650 SUPPOSITORY RECTAL
Status: DISCONTINUED | OUTPATIENT
Start: 2021-01-01 | End: 2021-01-01 | Stop reason: HOSPADM

## 2021-01-01 RX ORDER — METFORMIN HYDROCHLORIDE 500 MG/1
500 TABLET, EXTENDED RELEASE ORAL 2 TIMES DAILY
COMMUNITY
End: 2022-01-01

## 2021-01-01 RX ORDER — PREDNISONE 1 MG/1
1 TABLET ORAL
Qty: 90 TABLET | Refills: 0 | Status: SHIPPED | OUTPATIENT
Start: 2021-01-01 | End: 2021-01-01 | Stop reason: SDUPTHER

## 2021-01-01 RX ORDER — ONDANSETRON 2 MG/ML
6 INJECTION INTRAMUSCULAR; INTRAVENOUS
Status: DISCONTINUED | OUTPATIENT
Start: 2021-01-01 | End: 2021-01-01 | Stop reason: HOSPADM

## 2021-01-01 RX ORDER — GUAIFENESIN 100 MG/5ML
81 LIQUID (ML) ORAL DAILY
Status: DISCONTINUED | OUTPATIENT
Start: 2021-01-01 | End: 2021-01-01 | Stop reason: HOSPADM

## 2021-01-01 RX ORDER — LATANOPROST 50 UG/ML
1 SOLUTION/ DROPS OPHTHALMIC EVERY EVENING
Status: DISCONTINUED | OUTPATIENT
Start: 2021-01-01 | End: 2021-01-01 | Stop reason: HOSPADM

## 2021-01-01 RX ORDER — SODIUM BICARBONATE 650 MG/1
650 TABLET ORAL DAILY
Qty: 90 TABLET | Refills: 3 | Status: SHIPPED | OUTPATIENT
Start: 2021-01-01 | End: 2021-01-01

## 2021-01-01 RX ORDER — FUROSEMIDE 20 MG/1
20 TABLET ORAL
Qty: 60 TABLET | Refills: 0
Start: 2021-01-01 | End: 2022-01-01

## 2021-01-01 RX ORDER — INSULIN LISPRO 100 [IU]/ML
INJECTION, SOLUTION INTRAVENOUS; SUBCUTANEOUS
Status: DISCONTINUED | OUTPATIENT
Start: 2021-01-01 | End: 2021-01-01 | Stop reason: HOSPADM

## 2021-01-01 RX ORDER — DORZOLAMIDE HYDROCHLORIDE AND TIMOLOL MALEATE 20; 5 MG/ML; MG/ML
1 SOLUTION/ DROPS OPHTHALMIC 2 TIMES DAILY
Status: DISCONTINUED | OUTPATIENT
Start: 2021-01-01 | End: 2021-01-01 | Stop reason: HOSPADM

## 2021-01-01 RX ORDER — SULFASALAZINE 500 MG/1
1000 TABLET ORAL 2 TIMES DAILY
Qty: 360 TABLET | Refills: 0 | Status: SHIPPED | OUTPATIENT
Start: 2021-01-01

## 2021-01-01 RX ORDER — CARVEDILOL 3.12 MG/1
3.12 TABLET ORAL 2 TIMES DAILY
Status: DISCONTINUED | OUTPATIENT
Start: 2021-01-01 | End: 2021-01-01 | Stop reason: HOSPADM

## 2021-01-01 RX ORDER — ACETAMINOPHEN 325 MG/1
650 TABLET ORAL
Status: DISCONTINUED | OUTPATIENT
Start: 2021-01-01 | End: 2021-01-01 | Stop reason: HOSPADM

## 2021-01-01 RX ORDER — LABETALOL HCL 20 MG/4 ML
5 SYRINGE (ML) INTRAVENOUS
Status: DISCONTINUED | OUTPATIENT
Start: 2021-01-01 | End: 2021-01-01 | Stop reason: HOSPADM

## 2021-01-01 RX ORDER — EZETIMIBE 10 MG/1
10 TABLET ORAL DAILY
Status: DISCONTINUED | OUTPATIENT
Start: 2021-01-01 | End: 2021-01-01 | Stop reason: HOSPADM

## 2021-01-01 RX ADMIN — FOLIC ACID 1 MG: 1 TABLET ORAL at 08:46

## 2021-01-01 RX ADMIN — IOPAMIDOL 100 ML: 755 INJECTION, SOLUTION INTRAVENOUS at 17:46

## 2021-01-01 RX ADMIN — CYANOCOBALAMIN TAB 500 MCG 1000 MCG: 500 TAB at 08:46

## 2021-01-01 RX ADMIN — PREDNISONE 1 MG: 1 TABLET ORAL at 08:46

## 2021-01-01 RX ADMIN — ASPIRIN 81 MG: 81 TABLET, CHEWABLE ORAL at 08:46

## 2021-01-01 RX ADMIN — INSULIN LISPRO 2 UNITS: 100 INJECTION, SOLUTION INTRAVENOUS; SUBCUTANEOUS at 08:46

## 2021-01-01 RX ADMIN — LATANOPROST 1 DROP: 50 SOLUTION OPHTHALMIC at 22:48

## 2021-01-01 RX ADMIN — MAGNESIUM SULFATE HEPTAHYDRATE 2 G: 2 INJECTION, SOLUTION INTRAVENOUS at 08:47

## 2021-01-01 RX ADMIN — Medication 1000 UNITS: at 08:45

## 2021-01-01 RX ADMIN — CARVEDILOL 3.12 MG: 3.12 TABLET, FILM COATED ORAL at 08:46

## 2021-01-01 RX ADMIN — POTASSIUM CHLORIDE 40 MEQ: 750 TABLET, FILM COATED, EXTENDED RELEASE ORAL at 08:45

## 2021-01-01 RX ADMIN — CLOPIDOGREL BISULFATE 75 MG: 75 TABLET ORAL at 08:46

## 2021-01-01 RX ADMIN — EZETIMIBE 10 MG: 10 TABLET ORAL at 08:46

## 2021-01-01 RX ADMIN — LISINOPRIL 40 MG: 20 TABLET ORAL at 08:46

## 2021-01-01 RX ADMIN — ASPIRIN 81 MG: 81 TABLET, CHEWABLE ORAL at 22:46

## 2021-01-01 RX ADMIN — DORZOLAMIDE HYDROCHLORIDE AND TIMOLOL MALEATE 1 DROP: 20; 5 SOLUTION/ DROPS OPHTHALMIC at 22:48

## 2021-01-01 RX ADMIN — SULFASALAZINE 1000 MG: 500 TABLET ORAL at 08:45

## 2021-01-01 RX ADMIN — PERFLUTREN 2 ML: 6.52 INJECTION, SUSPENSION INTRAVENOUS at 11:05

## 2021-01-01 RX ADMIN — DORZOLAMIDE HYDROCHLORIDE AND TIMOLOL MALEATE 1 DROP: 20; 5 SOLUTION/ DROPS OPHTHALMIC at 08:47

## 2021-01-01 RX ADMIN — SULFASALAZINE 1000 MG: 500 TABLET ORAL at 23:00

## 2021-01-25 ENCOUNTER — TELEPHONE (OUTPATIENT)
Dept: INTERNAL MEDICINE CLINIC | Age: 86
End: 2021-01-25

## 2021-01-25 DIAGNOSIS — K51.919 ULCERATIVE COLITIS WITH COMPLICATION, UNSPECIFIED LOCATION (HCC): ICD-10-CM

## 2021-01-25 RX ORDER — SULFASALAZINE 500 MG/1
1000 TABLET ORAL 2 TIMES DAILY
Qty: 360 TAB | Refills: 1 | Status: SHIPPED | OUTPATIENT
Start: 2021-01-25 | End: 2021-01-01 | Stop reason: SDUPTHER

## 2021-01-25 NOTE — TELEPHONE ENCOUNTER
Per patient his script for Sulfasalazine 500 mg needs to be corrected, states he needs a refill but the script should be written for  Sulfasalazine 500 mg taking 2 tabs 2 times daily , not 1tab two times daily. States he ran out of medication early due to this error.        Please send to his [facility]

## 2021-05-12 NOTE — TELEPHONE ENCOUNTER
[facility] not [facility] of this medication until June 18th, please call in to local pharmacy as soon as possible. Patient states he normally gets a 90 day supply of this medicine .

## 2021-05-12 NOTE — TELEPHONE ENCOUNTER
Patient called back to inform us that this pharmacy and others do not have this medication - he is wondering if Dr Michelle Adan can call in something similar in the mean time

## 2021-05-13 NOTE — TELEPHONE ENCOUNTER
I can try a different anti inflammatory but it will not be like this medicine and does not help with Crohns as well as arthritis - so not the same thing.  Like Celebrex 200 mg a day we could do for a short while to help with joint pain

## 2021-05-13 NOTE — TELEPHONE ENCOUNTER
Patient notified to fill rx for the mesalamine but not the celebrex. Pt understood and was thankful for the call.

## 2021-05-13 NOTE — TELEPHONE ENCOUNTER
Spoke with patient and he states that the pharmacist he spoke with at [address] said an alternative medication could be mesalamine or balsalazide. Advised patient that Dr. Jolynn Servin has suggested celebrex and we could send that in to his pharmacy and I would discuss the other medications with her. Pt understood and was thankful for the call.

## 2021-08-03 NOTE — TELEPHONE ENCOUNTER
Pt calling states [facility] not [facility] this meds sulfasalazine for a new prescription. Please call back and advise.

## 2021-09-21 NOTE — PROGRESS NOTES
Kait Moreno (: 1930) is a 80 y.o. male, established patient, here for evaluation of the following chief complaint(s):  Medication Evaluation       ASSESSMENT/PLAN:  Below is the assessment and plan developed based on review of pertinent history, physical exam, labs, studies, and medications. 1. Ulcerative colitis with complication, unspecified location ([address]  -     CBC W/O DIFF; Future  Stable and well-managed. I encouraged the pt to return to GI to discuss alternate treatment options since he has trouble getting his current medication. Continue with ongoing regimen of Sulfasalazine. 2. PMR (polymyalgia rheumatica) (HCC)  Stable and well-managed. Continue with ongoing regimen of Prednisone. 3. Transient cerebral ischemia, unspecified type  Stable and well-managed. Continue with ongoing regimen of Plavix, Lasix, and Coreg. 4. Essential hypertension, benign  -     METABOLIC PANEL, COMPREHENSIVE; Future  BP is at goal. I do not recommend any change in medications. Continue with ongoing regimen of benazepril and lasix. 5. Hyperlipidemia LDL goal <100  -     LIPID PANEL; Future  Presumed stable, will recheck labs today. Patient is tolerating medications with no myalgias. I do not recommend any change in treatment plan. Continue with ongoing regimen of Zetia. 6. Prediabetes  -     HEMOGLOBIN A1C WITH EAG; Future  BG presumed stable, I am pleased with his most recent A1c. I will recheck today. Continue with ongoing regimen of Metformin, healthy diet, and exercise. 7. Chronic pain of both knees  I discussed the possibility of injections to manage his knee pain. 8. Stage 3b chronic kidney disease (HCC)  -     sodium bicarbonate 650 mg tablet; Take 1 Tablet by mouth daily. , Normal, Disp-90 Tablet, R-3  -     METABOLIC PANEL, COMPREHENSIVE; Future  I will refill the pt's Sodium bicarbonate. No follow-ups on file.     SUBJECTIVE/OBJECTIVE:  HPI     Ulcerative Colitis: Pt notes that he has trouble obtaining sulfasalazine. He plans to return to his GI to explore alternative medications. Diabetic Review of Systems - medication compliance: compliant all of the time. Other symptoms and concerns: Pt's last a1c was 5.2. He is still followed by endocrinology. Hypertension ROS: taking medications as instructed, no medication side effects noted, no TIA's, no chest pain on exertion, no dyspnea on exertion, no swelling of ankles. BP in office today is 135/80. Other: Pt reports knee buckling. He denies recent falls, which he thinks is because he uses his walker. Review of Systems   Musculoskeletal: Positive for arthralgias. All other systems reviewed and are negative. Physical Exam  Constitutional:       Appearance: Normal appearance. HENT:      Right Ear: Tympanic membrane and external ear normal.      Left Ear: Tympanic membrane and external ear normal.      Mouth/Throat:      Mouth: Mucous membranes are moist.      Pharynx: Oropharynx is clear. Cardiovascular:      Rate and Rhythm: Normal rate and regular rhythm. Pulses: Normal pulses. Heart sounds: Murmur heard. Pulmonary:      Effort: Pulmonary effort is normal.      Breath sounds: Normal breath sounds. Musculoskeletal:         General: Normal range of motion. Skin:     General: Skin is warm and dry. Neurological:      General: No focal deficit present. Mental Status: He is alert and oriented to person, place, and time. Psychiatric:         Mood and Affect: Mood normal.         Behavior: Behavior normal.       On this date 09/21/2021 I have spent 30 minutes reviewing previous notes, test results and face to face with the patient discussing the diagnosis and importance of compliance with the treatment plan as well as documenting on the day of the visit. An electronic signature was used to authenticate this note. Written by Mary Bustillos as dictated by Dr. More Askew.    -- Mary Bustillos

## 2021-09-30 NOTE — TELEPHONE ENCOUNTER
Patient wanted to let the nurse know a refill request will be sent via [facility] for the doctor to approve.

## 2021-10-19 NOTE — PROGRESS NOTES
This is the Subsequent Medicare Annual Wellness Exam, performed 12 months or more after the Initial AWV or the last Subsequent AWV    I have reviewed the patient's medical history in detail and updated the computerized patient record. Assessment/Plan   Education and counseling provided:  Are appropriate based on today's review and evaluation    1. Ulcerative colitis with complication, unspecified location ([facility] [address]  2. Needs flu shot  -     FLU (FLUAD QUAD INFLUENZA VACCINE,QUAD,ADJUVANTED)  3. PMR (polymyalgia rheumatica) (HCC)  -     predniSONE (DELTASONE) 1 mg tablet; Take 1 Tablet by mouth daily (with breakfast). , Normal, Disp-90 Tablet, R-3  4. Hyperlipidemia LDL goal <100  5. Prediabetes       Depression Risk Factor Screening     3 most recent PHQ Screens 9/21/2021   Little interest or pleasure in doing things Not at all   Feeling down, depressed, irritable, or hopeless Not at all   Total Score PHQ 2 0       Alcohol Risk Screen    Do you average more than 1 drink per night or more than 7 drinks a week: No    In the past three months have you have had more than 4 drinks containing alcohol on one occasion: No        Functional Ability and Level of Safety    Hearing: Hearing is good. Activities of Daily Living: The home contains: no safety equipment. Patient does total self care      Ambulation: with difficulty, uses a walker     Fall Risk:  Fall Risk Assessment, last 12 mths 10/21/2020   Able to walk? Yes   Fall in past 12 months? Yes   Number of falls in past 12 months 1   Fall with injury?  0      Abuse Screen:  Patient is not abused       Cognitive Screening    Has your family/caregiver stated any concerns about your memory: yes - slight changes with age     Cognitive Screening: wife was worried about memory but seems nromal cog changes    Health Maintenance Due     Health Maintenance Due   Topic Date Due    DTaP/Tdap/Td series (2 - Td or Tdap) 08/06/2021    Medicare Yearly Exam  10/22/2021 Patient Care Team   Patient Care Team:  Dung Velazquez MD as PCP - General (Internal Medicine)  Dung Velazquez MD as PCP [facility] Provider    History     Patient Active Problem List   Diagnosis Code    Essential hypertension, benign I10    UC (ulcerative colitis) ([address] K51.90    TIA (transient ischemic attack) G45.9    Hyperlipidemia LDL goal <100 E78.5    DJD (degenerative joint disease) M19.90    PMR (polymyalgia rheumatica) ([address] M35.3    Sleep apnea G47.30    Advanced care planning/counseling discussion Z71.89     Past Medical History:   Diagnosis Date    Arrhythmia     a fib on occasion    Arthritis     Autoimmune disease ([address]     Questionable Fibromyalgia    Cancer ([address]     basal cell on nose, ear and cheek    Chronic pain     Diabetes ([address]     Hypertension     Other and unspecified hyperlipidemia 4/7/2013    Other ill-defined conditions(799.89)     bilateral glaucoma    Other ill-defined conditions(799.89)     UC    Sleep apnea     Stool color black     Stroke ([address] 11/2010 4/13, 6/15  TIA    UC (ulcerative colitis) ([address] 10/14/2009    Unspecified sleep apnea     on bipap      Past Surgical History:   Procedure Laterality Date    HX COLONOSCOPY  12/2014    HX ORTHOPAEDIC      left knee arthroscopy     Current Outpatient Medications   Medication Sig Dispense Refill    predniSONE (DELTASONE) 1 mg tablet Take 1 Tablet by mouth daily (with breakfast). 90 Tablet 3    vit A,C,E-zinc-copper (PreserVision AREDS) cap capsule Take 1 Capsule by mouth.  B.infantis-B.ani-B.long-B.bifi (Probiotic 4X) 10-15 mg TbEC Take  by mouth daily.  sodium bicarbonate 650 mg tablet Take 1 Tablet by mouth daily. 90 Tablet 3    sulfaSALAzine (AZULFIDINE) 500 mg tablet Take 2 Tablets by mouth two (2) times a day. 360 Tablet 0    clopidogreL (PLAVIX) 75 mg tab TAKE 1 TABLET DAILY 90 Tab 3    turmeric-herbal complex no. 278 150 mg cap Take 1,000 mg by mouth.       ezetimibe (ZETIA) 10 mg tablet 10 mg.      folic acid (FOLVITE) 1 mg tablet Take 1 Tab by mouth daily. 90 Tab 3    furosemide (LASIX) 20 mg tablet       COREG CR 10 mg CR capsule       cholecalciferol, vitamin D3, (VITAMIN D3) 2,000 unit tab Take  by mouth.  cyanocobalamin (VITAMIN B-12) 1,000 mcg tablet Take 1,000 mcg by mouth daily.  timolol (BETIMOL) 0.5 % ophthalmic solution Administer 1 drop to both eyes two (2) times a day. use in affected eye(s)      metFORMIN (GLUCOPHAGE) 500 mg tablet Take 500 mg by mouth three (3) times daily.  omega-3 fatty acids-vitamin e (FISH OIL) 1,000 mg Cap Take 1 Cap by mouth three (3) times daily.  benazepril (LOTENSIN) 40 mg tablet Take 40 mg by mouth daily.  bimatoprost (LUMIGAN) 0.03 % ophthalmic drops Administer 1 Drop to both eyes every evening.  ACETAMINOPHEN (TYLENOL ARTHRITIS PAIN PO) Take 500 mg by mouth two (2) times a day.        Allergies   Allergen Reactions    Statins-Hmg-Coa Reductase Inhibitors Myalgia       Family History   Problem Relation Age of Onset    Stroke Mother     Dementia Mother     Cancer Father         pancreatic    Stroke Brother     Cancer Brother     Stroke Brother      Social History     Tobacco Use    Smoking status: Never Smoker    Smokeless tobacco: Never Used   Substance Use Topics    Alcohol use: No         Paulina Matta MD

## 2021-10-19 NOTE — PROGRESS NOTES
Mayola Mohs (: 1930) is a 80 y.o. male, established patient, here for evaluation of the following chief complaint(s):  Immunization/Injection (flu vaccs) and Results (labs)       ASSESSMENT/PLAN:  Below is the assessment and plan developed based on review of pertinent history, physical exam, labs, studies, and medications. 1. Medicare annual wellness visit, subsequent  2. Needs flu shot  -     FLU (FLUAD QUAD INFLUENZA VACCINE,QUAD,ADJUVANTED)  Flu shot to be administered in office today. 3. Ulcerative colitis with complication, unspecified location [facility])  I encouraged the pt to consult Dr. Roseanne Currie office about his difficulty obtaining his medication and alternative options at his upcoming apt. Continue with Sulfasalazine. 4. PMR (polymyalgia rheumatica) (HCC)  -     predniSONE (DELTASONE) 1 mg tablet; Take 1 Tablet by mouth daily (with breakfast). , Normal, Disp-90 Tablet, R-3  I will refill the pt's rx. Continue with ongoing regimen of Prednisone. 5. Hyperlipidemia LDL goal <100  I'm concerned that the pt's cholesterol may be chronically high. I offered to rx the injection treatments, but he denied interest at this point. Continue with Zetia. 6. Prediabetes  BG stable, continue with ongoing regimen of Metformin. 7. Transient cerebral ischemia, unspecified type  Stable and well-managed. Continue with ongoing regimen of Plavix, Lasix, and Coreg. 8. Stage 3b chronic kidney disease ([facility] [address]  Kidney fx stable, based on most recent draw. Continue with ongoing regimen of Sodium bicarbonate. No follow-ups on file. SUBJECTIVE/OBJECTIVE:  HPI    Ulcerative colitis: Pt notes difficulty obtaining his Sulfasalazine. Hypertension ROS: taking medications as instructed, no medication side effects noted, no TIA's, no chest pain on exertion, no dyspnea on exertion, no swelling of ankles. BP in office today is 145/82. Hyperlipidemia:  Cardiovascular risk analysis - 80 y.o. male LDL goal is under 100. ROS: taking medications as instructed, no medication side effects noted, no TIA's, no chest pain on exertion, no dyspnea on exertion, no swelling of ankles. Tolerating meds, no myalgias or other side effects noted. New concerns: Pt's last LDL was 184.8 on 9/21/21. Diabetic Review of Systems - medication compliance: compliant all of the time. Other symptoms and concerns: Pt's last a1c was 6.3 on 9/21/21 with an estimated BG of 134. Other: His last dermatology check-up was \"perfect. \" Pt does not drink alcohol. He denies a notable cognitive decline despite his wife's previous concerns. Review of Systems   All other systems reviewed and are negative. Physical Exam  Constitutional:       Appearance: Normal appearance. HENT:      Right Ear: Tympanic membrane and external ear normal.      Left Ear: Tympanic membrane and external ear normal.      Mouth/Throat:      Mouth: Mucous membranes are moist.      Pharynx: Oropharynx is clear. Cardiovascular:      Rate and Rhythm: Normal rate and regular rhythm. Pulses: Normal pulses. Heart sounds: Normal heart sounds. Pulmonary:      Effort: Pulmonary effort is normal.      Breath sounds: Normal breath sounds. Musculoskeletal:         General: Normal range of motion. Skin:     General: Skin is warm and dry. Neurological:      General: No focal deficit present. Mental Status: He is alert and oriented to person, place, and time. Psychiatric:         Mood and Affect: Mood normal.         Behavior: Behavior normal.     On this date 10/19/2021 I have spent 30 minutes reviewing previous notes, test results and face to face with the patient discussing the diagnosis and importance of compliance with the treatment plan as well as documenting on the day of the visit. An electronic signature was used to authenticate this note. Written by Linda Timmons as dictated by Dr. Peggy Darnell.    -- Linda Timmons

## 2021-10-19 NOTE — PATIENT INSTRUCTIONS
Medicare Wellness Visit, Male    The best way to live healthy is to have a lifestyle where you eat a well-balanced diet, exercise regularly, limit alcohol use, and quit all forms of tobacco/nicotine, if applicable. Regular preventive services are another way to keep healthy. Preventive services (vaccines, screening tests, monitoring & exams) can help personalize your care plan, which helps you manage your own care. Screening tests can find health problems at the earliest stages, when they are easiest to treat. [facility] follows the current, evidence-based guidelines published by the [facility] ([facility]) when recommending preventive services for our patients. Because we follow these guidelines, sometimes recommendations change over time as research supports it. (For example, a prostate screening blood test is no longer routinely recommended for men with no symptoms). Of course, you and your doctor may decide to screen more often for some diseases, based on your risk and co-morbidities (chronic disease you are already diagnosed with). Preventive services for you include:  - Medicare offers their members a free annual wellness visit, which is time for you and your primary care provider to discuss and plan for your preventive service needs. Take advantage of this benefit every year!  -All adults over age 72 should receive the recommended pneumonia vaccines. Current USPSTF guidelines recommend a series of two vaccines for the best pneumonia protection.   -All adults should have a flu vaccine yearly and tetanus vaccine every 10 years.  -All adults age 48 and older should receive the shingles vaccines (series of two vaccines).        -All adults age 38-68 who are overweight should have a diabetes screening test once every three years.   -Other screening tests & preventive services for persons with diabetes include: an eye exam to screen for diabetic retinopathy, a kidney function test, a foot exam, and stricter control over your cholesterol.   -Cardiovascular screening for adults with routine risk involves an electrocardiogram (ECG) at intervals determined by the provider.   -Colorectal cancer screening should be done for adults age 54-65 with no increased risk factors for colorectal cancer. There are a number of acceptable methods of screening for this type of cancer. Each test has its own benefits and drawbacks. Discuss with your provider what is most appropriate for you during your annual wellness visit. The different tests include: colonoscopy (considered the best screening method), a fecal occult blood test, a fecal DNA test, and sigmoidoscopy.  -All adults born between [facility] should be screened once for Hepatitis C.  -An Abdominal Aortic Aneurysm (AAA) Screening is recommended for men age 73-68 who has ever smoked in their lifetime.      Here is a list of your current Health Maintenance items (your personalized list of preventive services) with a due date:  Health Maintenance Due   Topic Date Due    DTaP/Tdap/Td  (2 - Td or Tdap) 08/06/2021    Annual Well Visit  10/22/2021

## 2021-12-30 PROBLEM — R77.8 ELEVATED TROPONIN: Status: ACTIVE | Noted: 2021-01-01

## 2021-12-30 PROBLEM — N17.9 AKI (ACUTE KIDNEY INJURY) (HCC): Status: ACTIVE | Noted: 2021-01-01

## 2021-12-30 PROBLEM — I63.9 CVA (CEREBRAL VASCULAR ACCIDENT) (HCC): Status: ACTIVE | Noted: 2021-01-01

## 2021-12-30 NOTE — ED TRIAGE NOTES
Pt reports since the night of 12/27 pt has had confusion, slurred speech, aphasia, and difficulty swallowing. Sx lasted for about 3-4 hours and have now improved. Hx of TIA. Takes plavix. Was seen by PCP and had an outpatient MRI that confirmed he had a stroke.

## 2021-12-30 NOTE — PROGRESS NOTES
HISTORY OF PRESENT ILLNESS  Joselo Evans is a 80 y.o. male. HPI  Joselo Evans was referred for evaluation by:Dr. Neeta Larson for Pre- Op Evaluation. Please see encounter details and orders for consultative summary. Type of surgery : Cataract extraction with lens implant  Surgery site : Left eye  Anesthesia type: Regional anesthesia  Date of procedure:  1/6/2022    Allergies: Allergies   Allergen Reactions    Statins-Hmg-Coa Reductase Inhibitors Myalgia     Latex allergy: no  Prior reactions to anesthesia:  None    Functional status:  he is able to ambulate up 1 flights of step without shortness of breath, chest pain  Prior cardiac evaluation:  None    He is accompanied by his wife to visit today who reports that he most likely had another TIA on 12/27/21 where he had difficulty swallowing, slurring of speech and confusion that lasted for about 3 hours before subsiding. Apparently he had refused to go to the ER for evaluation. Has prior history of TIAs and has been on Plavix for years. Denies missing any doses of Plavix. Feels like he has completely returned to his normal baseline and has no residual effects from episode on 12/27. Reports that he was recently diagnosed with anemia by GI office and admits that he had run out of his Sulfasalazine and had been having a flare of his UC for several months. He has since been restarted on his medication but continues to have issues with diarrhea. GI has sent him with an order to have Vitamin B12 and Folate levels drawn. Has been seeing Urology due to chronic hydronephrosis of left kidney that has been present for the past 2-3 years and reports urologist believes that his left kidney is no longer functioning. Current Outpatient Medications   Medication Sig    folic acid (FOLVITE) 1 mg tablet TAKE 1 TABLET DAILY    clopidogreL (PLAVIX) 75 mg tab TAKE 1 TABLET DAILY    predniSONE (DELTASONE) 1 mg tablet Take 1 Tablet by mouth daily (with breakfast).  vit A,C,E-zinc-copper (PreserVision AREDS) cap capsule Take 1 Capsule by mouth.  B.infantis-B.ani-B.long-B.bifi (Probiotic 4X) 10-15 mg TbEC Take  by mouth daily.  sodium bicarbonate 650 mg tablet Take 1 Tablet by mouth daily.  sulfaSALAzine (AZULFIDINE) 500 mg tablet Take 2 Tablets by mouth two (2) times a day.  turmeric-herbal complex no. 278 150 mg cap Take 1,000 mg by mouth.  ezetimibe (ZETIA) 10 mg tablet 10 mg.    furosemide (LASIX) 20 mg tablet     COREG CR 10 mg CR capsule     cholecalciferol, vitamin D3, (VITAMIN D3) 2,000 unit tab Take  by mouth.  cyanocobalamin (VITAMIN B-12) 1,000 mcg tablet Take 1,000 mcg by mouth daily.  timolol (BETIMOL) 0.5 % ophthalmic solution Administer 1 drop to both eyes two (2) times a day. use in affected eye(s)    metFORMIN (GLUCOPHAGE) 500 mg tablet Take 500 mg by mouth three (3) times daily.  omega-3 fatty acids-vitamin e (FISH OIL) 1,000 mg Cap Take 1 Cap by mouth three (3) times daily.  benazepril (LOTENSIN) 40 mg tablet Take 40 mg by mouth daily.  bimatoprost (LUMIGAN) 0.03 % ophthalmic drops Administer 1 Drop to both eyes every evening.  ACETAMINOPHEN (TYLENOL ARTHRITIS PAIN PO) Take 500 mg by mouth two (2) times a day. No current facility-administered medications for this visit.      Past Medical History:   Diagnosis Date    Arrhythmia     a fib on occasion    Arthritis     Autoimmune disease ([address]     Questionable Fibromyalgia    Cancer ([address]     basal cell on nose, ear and cheek    Chronic pain     Diabetes ([address]     Hypertension     Other and unspecified hyperlipidemia 4/7/2013    Other ill-defined conditions(799.89)     bilateral glaucoma    Other ill-defined conditions(799.89)     UC    Sleep apnea     Stool color black     Stroke [facility]) 11/2010 4/13, 6/15  TIA    UC (ulcerative colitis) ([address] 10/14/2009    Unspecified sleep apnea     on bipap     Past Surgical History:   Procedure Laterality Date    HX COLONOSCOPY  12/2014    HX ORTHOPAEDIC      left knee arthroscopy     Social History     Tobacco Use    Smoking status: Never Smoker    Smokeless tobacco: Never Used   Vaping Use    Vaping Use: Never used   Substance Use Topics    Alcohol use: No    Drug use: No       Blood pressure (!) 156/90, pulse 99, temperature 98.3 °F (36.8 °C), temperature source Oral, resp. rate 16, height 5' 8\" (1.727 m), weight 185 lb 6.4 oz (84.1 kg), SpO2 95 %. Recheck of blood pressure with manual cuff 132/80. Review of Systems   Constitutional: Negative for chills, fever and malaise/fatigue. HENT: Negative for congestion, ear pain, hearing loss, sinus pain and sore throat. Eyes: Positive for blurred vision. Respiratory: Negative for cough and shortness of breath. Cardiovascular: Negative for chest pain, palpitations and leg swelling. Gastrointestinal: Positive for diarrhea. Negative for abdominal pain, blood in stool, heartburn, nausea and vomiting. Genitourinary: Negative for dysuria, frequency and hematuria. Musculoskeletal: Positive for back pain. Skin: Negative for rash. Neurological: Negative for dizziness, tingling, speech change, weakness and headaches. Endo/Heme/Allergies: Bruises/bleeds easily. Psychiatric/Behavioral: Negative for depression. The patient is not nervous/anxious. Physical Exam  Vitals and nursing note reviewed. Constitutional:       General: He is not in acute distress. Appearance: Normal appearance. HENT:      Head: Normocephalic and atraumatic. Right Ear: Tympanic membrane, ear canal and external ear normal.      Left Ear: Tympanic membrane, ear canal and external ear normal.      Nose: Nose normal.      Mouth/Throat:      Mouth: Mucous membranes are moist.      Pharynx: Oropharynx is clear. Eyes:      Extraocular Movements: Extraocular movements intact.       Conjunctiva/sclera: Conjunctivae normal.   Cardiovascular:      Rate and Rhythm: Normal rate and regular rhythm. Pulmonary:      Effort: Pulmonary effort is normal.      Breath sounds: Normal breath sounds. No wheezing or rhonchi. Abdominal:      General: Bowel sounds are normal.      Tenderness: There is no abdominal tenderness. Hernia: No hernia is present. Musculoskeletal:         General: Normal range of motion. Cervical back: Normal range of motion and neck supple. Skin:     General: Skin is warm and dry. Neurological:      General: No focal deficit present. Mental Status: He is alert and oriented to person, place, and time. Comments: Slightly unsteady with ambulation with walker   Psychiatric:         Mood and Affect: Mood normal.         Behavior: Behavior normal.         ASSESSMENT and PLAN  Diagnoses and all orders for this visit:    1. Senile cataract of left eye, unspecified age-related cataract type    2. Preop general physical exam --  Concern regarding recent neurological deficits; do not recommend that he have elective cataract surgery at this time. 3. Transient ischemic attack (TIA) -- consulted with his PCP, Dr Annel Montesinos and we will send him for MRI today. Will need Neurological work up. -     MRI BRAIN WO CONT; Future  -     DUPLEX CAROTID BILATERAL; Future  -     REFERRAL TO NEUROLOGY    4. Slurring of speech  -     MRI BRAIN WO CONT; Future  -     DUPLEX CAROTID BILATERAL; Future  -     REFERRAL TO NEUROLOGY    5. Ulcerative colitis with complication, unspecified location [facility]) -- has been having a flare up recently and followed by GI. 6. Anemia, unspecified type    7.  Decreased renal function  -     METABOLIC PANEL, BASIC; Future      lab results and schedule of future lab studies reviewed with patient  reviewed diet, exercise and weight control  cardiovascular risk and specific lipid/LDL goals reviewed  reviewed medications and side effects in detail  radiology results and schedule of future radiology studies reviewed with patient    Addendum 12/30/2021 2:30 pm  Received call from Radiology regarding MRI results with signs of acute infarct in Right posterior frontal lobe; discussed with Dr Kapil Pinzon; will send to [facility] ED to be admitted for Neurological workup. Wife notified.

## 2021-12-30 NOTE — PATIENT INSTRUCTIONS
Cataract Surgery: Before Your Surgery  What is cataract surgery? Cataracts are cloudy areas in the lens of your eye. Your lens is behind the colored part of your eye (iris). Its job is to focus light onto the back of your eye. In some people, cataracts prevent light from reaching the back of the eye. This can cause vision problems. Cataract surgery helps you see better. It replaces your natural lens, which has become cloudy, with a clear artificial one. There are several types of cataract surgery. They include:  · Phacoemulsification. This is the most common type. The doctor makes a small cut (incision) in your eye. A special ultrasound tool is used to break your cloudy lens apart. Then the small pieces of the lens are removed and replaced with an artificial lens. Most people do not need stitches, because the incision is so small. · Extracapsular extraction. This uses a larger incision to remove the lens in one piece. It is replaced with an artificial lens. And the cut is stitched. · Femtosecond laser-assisted cataract surgery (FLACS). This uses laser technology and replaces the natural lens with an artificial lens. Before surgery, you may be given medicine to help you relax. Medicine will be used to numb your eye. The surgery takes about 20 to 40 minutes. After surgery, you may have a bandage or shield on your eye. You will probably go home from surgery after 1 hour in the recovery room. You may be able to go back to work or your normal routine in a few days. Most people see better in 1 to 3 days. It could take 3 to 10 weeks for your eye to completely heal. After your eye heals, you may still need to wear glasses, especially for reading. How do you prepare for surgery? Surgery can be stressful. This information will help you understand what you can expect. And it will help you safely prepare for surgery. Preparing for surgery    · Be sure you have someone to take you home.  Anesthesia and pain medicine will make it unsafe for you to drive or get home on your own.     · Understand exactly what surgery is planned, along with the risks, benefits, and other options.     · If you take aspirin or some other blood thinner, ask your doctor if you should stop taking it before your surgery. Make sure that you understand exactly what your doctor wants you to do. These medicines increase the risk of bleeding.     · Tell your doctor ALL the medicines, vitamins, supplements, and herbal remedies you take. Some may increase the risk of problems during your surgery. Your doctor will tell you if you should stop taking any of them before the surgery and how soon to do it.     · Make sure your doctor and the hospital have a copy of your advance directive. If you don't have one, you may want to prepare one. It lets others know your health care wishes. It's a good thing to have before any type of surgery or procedure. What happens on the day of surgery? · Follow the instructions exactly about when to stop eating and drinking. If you don't, your surgery may be canceled. If your doctor told you to take your medicines on the day of surgery, take them with only a sip of water.     · Take a bath or shower before you come in for your surgery. Do not apply lotions, perfumes, deodorants, or nail polish.     · Take off all jewelry and piercings. At the hospital or surgery center   · Bring a picture ID.     · The area for surgery is often marked to make sure there are no errors.     · You will be kept comfortable and safe by your anesthesia provider. The anesthesia may make you sleep. Or it may just numb the area being worked on.     · The surgery will take about 20 to 40 minutes. When should you call your doctor?    · You have questions or concerns.     · You don't understand how to prepare for your surgery.     · You become ill before the surgery (such as fever, flu, or a cold).     · You need to reschedule or have changed your mind about having the surgery. Where can you learn more? Go to http://www.gray.com/  Enter K474 in the search box to learn more about \"Cataract Surgery: Before Your Surgery. \"  Current as of: April 29, 2021               Content Version: 13.0  © 1675-9025 [employer]. Care instructions adapted under license by [employer] (which disclaims liability or warranty for this information). If you have questions about a medical condition or this instruction, always ask your healthcare professional. [place] [address] any warranty or liability for your use of this information. Transient Ischemic Attack: Care Instructions  Overview     A transient ischemic attack (TIA) is when blood flow to a part of your brain is blocked for a short time. A TIA is like a stroke but usually lasts only a few minutes. A TIA does not cause lasting brain damage. Any vision problems, slurred speech, or other symptoms usually go away in 10 to 20 minutes. But they may last for up to 24 hours. TIAs are often warning signs of a stroke. Some people who have a TIA may have a stroke in the future. A stroke can cause symptoms like those of a TIA. But a stroke causes lasting damage to your brain. You can take steps to help prevent a stroke. One thing you can do is get early treatment. If you have other new symptoms, or if your symptoms do not get better, go back to the emergency room or call your doctor right away. Getting treatment right away may prevent long-term brain damage caused by a stroke. Follow-up care is a key part of your treatment and safety. Be sure to make and go to all appointments, and call your doctor if you are having problems. It's also a good idea to know your test results and keep a list of the medicines you take. How can you care for yourself at home? Medicines    · Be safe with medicines. Take your medicines exactly as prescribed.  Call your doctor if you think you are having a problem with your medicine.     · If you take a blood thinner, such as aspirin, be sure you get instructions about how to take your medicine safely. Blood thinners can cause serious bleeding problems.     · Call your doctor if you are not able to take your medicines for any reason.     · Do not take any over-the-counter medicines or herbal products without talking to your doctor first.     · If you take birth control pills or hormone therapy, talk to your doctor. Ask if these treatments are right for you. Lifestyle changes    · Do not smoke. If you need help quitting, talk to your doctor about stop-smoking programs and medicines.     · Be active. If your doctor recommends it, get more exercise. Walking is a good choice. Bit by bit, increase the amount you walk every day. Try for at least 30 minutes on most days of the week. You also may want to swim, bike, or do other activities.     · Eat heart-healthy foods. These include fruits, vegetables, high-fiber foods, lean meats, beans, peas, nuts, seeds, and soy products, and foods that are low in sodium, saturated fat, and trans fat.     · Stay at a healthy weight. Lose weight if you need to.     · Limit alcohol to 2 drinks a day for men and 1 drink a day for women. Staying healthy    · Manage other health problems such as diabetes, high blood pressure, and high cholesterol.     · Get the flu vaccine every year. When should you call for help? Call 911 anytime you think you may need emergency care. For example, call if:    · You have new or worse symptoms of a stroke. These may include:  ? Sudden numbness, tingling, weakness, or loss of movement in your face, arm, or leg, especially on only one side of your body. ? Sudden vision changes. ? Sudden trouble speaking. ? Sudden confusion or trouble understanding simple statements. ? Sudden problems with walking or balance.   ? A sudden, severe headache that is different from past headaches. Call 911 even if these symptoms go away in a few minutes.     · You feel like you are having another TIA. Watch closely for changes in your health, and be sure to contact your doctor if you have any problems. Where can you learn more? Go to http://www.grace.com/  Enter I231 in the search box to learn more about \"Transient Ischemic Attack: Care Instructions. \"  Current as of: July 6, 2021               Content Version: 13.0  © 2006-2021 [employer]. Care instructions adapted under license by [employer] (which disclaims liability or warranty for this information). If you have questions about a medical condition or this instruction, always ask your healthcare professional. [address] any warranty or liability for your use of this information.

## 2021-12-30 NOTE — ED PROVIDER NOTES
The history is provided by the patient and the spouse. No  was used. Extremity Weakness   This is a new problem. The current episode started more than 2 days ago. The problem occurs rarely. The problem has been resolved. Pertinent negatives include no numbness, no back pain and no neck pain. He has tried nothing for the symptoms. The treatment provided significant relief.         Past Medical History:   Diagnosis Date    Arrhythmia     a fib on occasion    Arthritis     Autoimmune disease ([address]     Questionable Fibromyalgia    Cancer ([address]     basal cell on nose, ear and cheek    Chronic pain     Diabetes ([address]     Hypertension     Other and unspecified hyperlipidemia 4/7/2013    Other ill-defined conditions(799.89)     bilateral glaucoma    Other ill-defined conditions(799.89)     UC    Sleep apnea     Stool color black     Stroke ([address] 11/2010 4/13, 6/15  TIA    UC (ulcerative colitis) ([address] 10/14/2009    Unspecified sleep apnea     on bipap       Past Surgical History:   Procedure Laterality Date    HX COLONOSCOPY  12/2014    HX ORTHOPAEDIC      left knee arthroscopy         Family History:   Problem Relation Age of Onset    Stroke Mother     Dementia Mother     Cancer Father         pancreatic    Stroke Brother     Cancer Brother     Stroke Brother        Social History     Socioeconomic History    Marital status:      Spouse name: Not on file    Number of children: Not on file    Years of education: Not on file    Highest education level: Not on file   Occupational History    Not on file   Tobacco Use    Smoking status: Never Smoker    Smokeless tobacco: Never Used   Vaping Use    Vaping Use: Never used   Substance and Sexual Activity    Alcohol use: No    Drug use: No    Sexual activity: Yes   Other Topics Concern    Not on file   Social History Narrative    Not on file     Social Determinants of Health     Financial Resource Strain:    [facility] Difficulty of Paying Living Expenses: Not on file   Food Insecurity:     Worried About Running Out of Food in the Last Year: Not on file    Ran Out of Food in the Last Year: Not on file   Transportation Needs:     Lack of Transportation (Medical): Not on file    Lack of Transportation (Non-Medical): Not on file   Physical Activity:     Days of Exercise per Week: Not on file    Minutes of Exercise per Session: Not on file   Stress:     Feeling of Stress : Not on file   Social Connections:     Frequency of Communication with Friends and Family: Not on file    Frequency of Social Gatherings with Friends and Family: Not on file    Attends [employer] Services: Not on file    Active Member of [address] [place] [address] [employer] or Organizations: Not on file    Attends Club or Organization Meetings: Not on file    Marital Status: Not on file   Intimate Partner Violence:     Fear of Current or Ex-Partner: Not on file    Emotionally Abused: Not on file    Physically Abused: Not on file    Sexually Abused: Not on file   Housing Stability:     Unable to Pay for Housing in the Last Year: Not on file    Number of [place] in the Last Year: Not on file    Unstable Housing in the Last Year: Not on file         ALLERGIES: Statins-hmg-coa reductase inhibitors    Review of Systems   Constitutional: Negative for activity change, chills and fever. HENT: Negative for nosebleeds, sore throat, trouble swallowing and voice change. Eyes: Negative for visual disturbance. Respiratory: Negative for shortness of breath. Cardiovascular: Negative for chest pain and palpitations. Gastrointestinal: Negative for abdominal pain, constipation, diarrhea and nausea. Genitourinary: Negative for difficulty urinating, dysuria, hematuria and urgency. Musculoskeletal: Negative for back pain, neck pain and neck stiffness. Skin: Negative for color change. Allergic/Immunologic: Negative for immunocompromised state.    Neurological: Positive for speech difficulty. Negative for dizziness, seizures, syncope, weakness, light-headedness, numbness and headaches. Psychiatric/Behavioral: Positive for confusion. Negative for behavioral problems, hallucinations, self-injury and suicidal ideas. Vitals:    12/30/21 1539   BP: (!) 158/84   Pulse: (!) 105   Resp: 16   Temp: 96.9 °F (36.1 °C)   SpO2: 95%   Weight: 84.1 kg (185 lb 6.5 oz)   Height: 5' 8\" (1.727 m)            Physical Exam  Vitals and nursing note reviewed. Constitutional:       General: He is not in acute distress. Appearance: He is well-developed. He is not diaphoretic. HENT:      Head: Normocephalic and atraumatic. Eyes:      Pupils: Pupils are equal, round, and reactive to light. Cardiovascular:      Rate and Rhythm: Normal rate and regular rhythm. Heart sounds: Normal heart sounds. No murmur heard. No friction rub. No gallop. Pulmonary:      Effort: Pulmonary effort is normal. No respiratory distress. Breath sounds: Normal breath sounds. No wheezing. Abdominal:      General: Bowel sounds are normal. There is no distension. Palpations: Abdomen is soft. Tenderness: There is no abdominal tenderness. There is no guarding or rebound. Musculoskeletal:         General: Normal range of motion. Cervical back: Normal range of motion and neck supple. Skin:     General: Skin is warm. Findings: No rash. Neurological:      Mental Status: He is alert and oriented to person, place, and time. GCS: GCS eye subscore is 4. GCS verbal subscore is 5. GCS motor subscore is 6. Cranial Nerves: Facial asymmetry present. Motor: Motor function is intact. Coordination: Coordination is intact. Gait: Gait is intact. Psychiatric:         Behavior: Behavior normal.         Thought Content:  Thought content normal.         Judgment: Judgment normal.          MDM     This is a 25-year-old male with past medical history, review of systems, physical exam as above, presenting from his primary care physician's office for concern of stroke. Patient states he was at their office today having a preop work-up for glaucoma surgery, when he confessed to 3 days ago he had an episode of dysarthria, confusion, and difficulty with his left hand. Patient and wife states symptoms lasted approximately 3 hours before resolving spontaneously. Patient states he has a history of TIA, on Plavix, and did not present for evaluation at the time of this event. He was sent for MRI today which shows acute right-sided frontal cortical stroke. Wife states he continues to have intermittent episodes of dysarthria and delayed responses without overt confusion. Physical exam is remarkable for well-appearing elderly male, in no acute distress noted to be mildly hypertensive, afebrile without tachycardia, satting well on room air. He appears to have trace right-sided facial droop, without other focal neurologic signs, clear breath sounds, soft abdomen, regular rate and rhythm with 2 out of 6 systolic ejection murmur. Plan to obtain CT imaging for vessel studies, EKG, CMP, CBC, coags. Anticipate admission for further care and evaluation. Procedures    Perfect Serve Consult for Admission  6:25 PM    ED Room Number: ER06/06  Patient Name and age: Joselo ePnn 80 y.o.  male  Working Diagnosis:   1. Cerebrovascular accident (CVA), unspecified mechanism ([facility]    2. ST elevation myocardial infarction (STEMI) of anterior wall ([facility]        COVID-19 Suspicion:  no  Sepsis present:  no  Reassessment needed: no  Code Status:  Full Code  Readmission: no  Isolation Requirements:  no  Recommended Level of Care:  telemetry  Department:[facility] ED - [phone]  Other:  D/w  [employer]      6:39 PM  Amended to NSTEMI.

## 2021-12-30 NOTE — PROGRESS NOTES
[facility]: MED RECONCILIATION      Medications added:   Cosopt eye drop    Medications removed:  Timolol eye drop  Probiotic  Sodium bicarbonate    Medications adjusted:  Metformin  Fish oil  Vit D3  Tylenol arthritis  Preservision  Furosemide  Zetia    Information obtained from: Medication list, patient, spouse, RxQuery (data available)      Allergies: Statins-hmg-coa reductase inhibitors    Prior to Admission Medications:     Medication Documentation Review Audit       Reviewed by Monica Quinn, PHARMD (Pharmacist) on 12/30/21 at 1719      Medication Sig Documenting Provider Last Dose Status Taking?   acetaminophen (Tylenol Arthritis Pain) 650 mg TbER Take 650 mg by mouth two (2) times daily as needed for Pain. Provider, Historical  Active Yes   benazepril (LOTENSIN) 40 mg tablet Take 40 mg by mouth daily. Provider, Historical  Active Yes   bimatoprost (LUMIGAN) 0.03 % ophthalmic drops Administer 1 Drop to both eyes every evening. Provider, Historical  Active Yes   cholecalciferol (Vitamin D3) (1000 Units /25 mcg) tablet Take 1,000 Units by mouth daily. Provider, Historical  Active Yes   clopidogreL (PLAVIX) 75 mg tab TAKE 1 TABLET DAILY Miah Sullivan MD  Active Yes   COREG CR 10 mg CR capsule Take 10 mg by mouth daily. Provider, Historical  Active Yes           Med Note (Jessica Roth. Thu Dec 30, 2021  4:52 PM)     cyanocobalamin (VITAMIN B-12) 1,000 mcg tablet Take 1,000 mcg by mouth daily. Provider, Historical  Active Yes   dorzolamide-timoloL (COSOPT) 22.3-6.8 mg/mL ophthalmic solution Administer 1 Drop to both eyes two (2) times a day. Provider, Historical  Active Yes   ezetimibe (ZETIA) 10 mg tablet Take 10 mg by mouth daily. Provider, Historical  Active Yes   folic acid (FOLVITE) 1 mg tablet TAKE 1 TABLET DAILY Miah Sullivan MD  Active Yes   furosemide (LASIX) 20 mg tablet Take 20 mg by mouth two (2) times a day.  Provider, Historical  Active Yes           Med Note (Jessica Roth. Thu Dec 30, 2021  4:53 PM)     metFORMIN ER (GLUCOPHAGE XR) 500 mg tablet Take 500 mg by mouth two (2) times a day. Provider, Historical  Active Yes   omega-3 fatty acids-vitamin e (FISH OIL) 1,000 mg Cap Take 1 Capsule by mouth two (2) times a day. Provider, Historical  Active Yes   predniSONE (DELTASONE) 1 mg tablet Take 1 Tablet by mouth daily (with breakfast). Lashell Becerra MD  Active Yes   sulfaSALAzine (AZULFIDINE) 500 mg tablet Take 2 Tablets by mouth two (2) times a day. Lashell Becerra MD  Active Yes   turmeric-herbal complex no. 278 150 mg cap Take 1,000 mg by mouth. Provider, Historical  Active Yes   vit A,C,E-zinc-copper (PreserVision AREDS) cap capsule Take 1 Capsule by mouth two (2) times a day. Provider, Historical  Active Yes                      Russ Schaeffer.  Randy Garcia

## 2021-12-30 NOTE — PROGRESS NOTES
Wife called with results; discussed with PCP; patient sent to [facility] ED to be admitted for acute CVA

## 2021-12-31 NOTE — PROGRESS NOTES
0700 Bedside shift change report given to [address] (oncoming nurse) by Mehrdad Aragon RN (offgoing nurse). Report included the following information SBAR, Kardex, ED Summary, Intake/Output, MAR and Recent Results. This patient was assisted with Intentional Toileting every 2 hours during this shift as appropriate. Documentation of ambulation and output reflected on Flowsheet as appropriate. Purposeful hourly rounding was completed using AIDET and 5Ps. Outcomes of PHR documented as they occurred. Bed alarm in use as appropriate. Dual Suction and ambubag in place. STROKE DOCUMENTATION:  Stroke Education provided to patient and the following topics were discussed    1. Patients personal risk factors for stroke are hypertension, hyperlipidemia, diabetes mellitus and prior stroke    2. Warning signs of Stroke:        * Sudden numbness or weakness of the face, arm or leg, especially on one side of          The body      * Sudden confusion, trouble speaking or understanding      * Sudden trouble seeing in one or both eyes      * Sudden trouble walking, dizziness, loss of balance or coordination      * Sudden severe headache with no known cause    3. Importance of activation Emergency Medical Services ( 9-1-1 ) immediately if experience any warning signs of stroke. 4. Be sure and schedule a follow-up appointment with your primary care doctor or any specialists as instructed. 5. You must take medicine every day to treat your risk factors for stroke. Be sure to take your medicines exactly as your doctor tells you: no more, no less. Know what your medicines are for , what they do. Anti-thrombotics /anticoagulants can help prevent strokes. You are taking the following medicine(s)  ASA, Plavix     6. Smoking and second-hand smoke greatly increase your risk of stroke, cardiovascular disease and death. Smoking history never    7. Information provided was [facility] Stroke Education Calista Rosenbaum or Verbal Education    8. Documentation of teaching completed in Patient Education Activity and on Care Plan with teaching response noted? yes     1500 Discharge papers reviewed and complete by patient and wife. Will be transported home by wife.

## 2021-12-31 NOTE — PROGRESS NOTES
SPEECH PATHOLOGY BEDSIDE SWALLOW EVALUATION/DISCHARGE  Patient: Sivakumar German (37 y.o. male)  Date: 12/31/2021  Primary Diagnosis: CVA (cerebral vascular accident) [facility]) [I63.9]       Precautions: aspiration  Fall    ASSESSMENT :  Based on the objective data described below, the patient presents with functional speech and swallowing. Educated about potential speech and swallowing issues with CVA. Admitted 12-20-21 with dysarthria, aphasia that was present 12-27-21, but resolved. MRI:  Small R posterior frontal lobe, WM dz. .  PMH:  [id number], 2015,  NATHAN, (B) glaucoma,  Skin CA on face, chronic pain,  Arthritis, possible fibromyalgia, DM, XOL, HTN, ulcerative colitis. Skilled acute therapy provided by a speech-language pathologist is not indicated at this time. PLAN :  Recommendations:  Ok for regular diet. Discharge Recommendations: None     SUBJECTIVE:   Patient stated I'm from [place].     OBJECTIVE:     Past Medical History:   Diagnosis Date    Arrhythmia     a fib on occasion    Arthritis     Autoimmune disease ([address]     Questionable Fibromyalgia    Cancer ([address]     basal cell on nose, ear and cheek    Chronic pain     Diabetes ([address]     Hypertension     Other and unspecified hyperlipidemia 4/7/2013    Other ill-defined conditions(799.89)     bilateral glaucoma    Other ill-defined conditions(799.89)     UC    Sleep apnea     Stool color black     Stroke [facility]) 11/2010 4/13, 6/15  TIA    UC (ulcerative [address]) [facility] 10/14/2009    Unspecified sleep apnea     on bipap     Past Surgical History:   Procedure Laterality Date    HX COLONOSCOPY  12/2014    HX ORTHOPAEDIC      left knee arthroscopy     Prior Level of Function/Home Situation:   Home Situation  Home Environment: Private residence  # Steps to Enter: 3  One/Two Story Residence: Two story, live on 1st floor  Living Alone: No  Support Systems: Spouse/Significant Other,Other Family Member(s)  Patient Expects to be Discharged to[de-identified] House  Current DME Used/Available at Home: [place], rollator,CPAP,Shower chair,Grab bars  Tub or Shower Type: Shower  Diet prior to admission: regular, thins  Current Diet:  Regular, thins. He passed the STANd   Cognitive and Communication Status:  Neurologic State: Alert  Orientation Level: Oriented X4  Cognition: Appropriate decision making  Perception: Appears intact  Perseveration: No perseveration noted  Safety/Judgement: Awareness of environment  Oral Assessment:  Oral Assessment  Labial: No impairment  Dentition: Natural  Oral Hygiene: WFL  Lingual: No impairment  Velum: No impairment  Mandible: No impairment  P.O. Trials:  Patient Position: up in chair  Vocal quality prior to P.O.: No impairment  Consistency Presented: Solid; Thin liquid  How Presented: Self-fed/presented; Successive swallows;Straw   ORAL PHASE;  Bolus Acceptance: No impairment  Bolus Formation/Control: No impairment     Propulsion: No impairment  Oral Residue: None   PHARYNGEAL PHASE:   Initiation of Swallow: No impairment  Laryngeal Elevation: Functional  Aspiration Signs/Symptoms: None  Pharyngeal Phase Characteristics: No impairment, issues, or problems            SPEECH:   NO ISSUES. NOMS:   The NOMS functional outcome measure was used to quantify this patient's level of swallowing impairment. Based on the NOMS, the patient was determined to be at level 7 for swallow function     NOMS Swallowing Levels:  Level 1 (CN): NPO  Level 2 (CM): NPO but takes consistency in therapy  Level 3 (CL): Takes less than 50% of nutrition p.o. and continues with nonoral feedings; and/or safe with mod cues; and/or max diet restriction  Level 4 (CK):  Safe swallow but needs mod cues; and/or mod diet restriction; and/or still requires some nonoral feeding/supplements  Level 5 (CJ): Safe swallow with min diet restriction; and/or needs min cues  Level 6 (CI): Independent with p.o.; rare cues; usually self cues; may need to avoid some foods or needs extra time  Level 7 ([address] [place] [address]): Independent for all p.o.  LIZETH. (2003). National Outcomes Measurement System (NOMS): Adult Speech-Language Pathology User's Guide. Pain:  Pain Scale 1: Numeric (0 - 10)  Pain Intensity 1: 0     After treatment:   Patient left in no apparent distress in bed, Nursing notified and Caregiver / family present    COMMUNICATION/EDUCATION:   Patient was educated regarding his deficit(s) of NONE as this relates to his diagnosis of CVA. He demonstrated Good understanding as evidenced by DISCUSSION. .    The patient's plan of care including recommendations, planned interventions, and recommended diet changes were discussed with: Occupational therapist and Registered nurse.      Thank you for this referral.  ALIA Cdeeño  Time Calculation: 15 mins

## 2021-12-31 NOTE — ED NOTES
Bedside and Verbal shift change report given to Niecy Carter (oncoming nurse) by Randi Hernandez RN (offgoing nurse). Report included the following information SBAR, Kardex and ED Summary.

## 2021-12-31 NOTE — PROGRESS NOTES
Problem: Self Care Deficits Care Plan (Adult)  Goal: *Acute Goals and Plan of Care (Insert Text)  Description: FUNCTIONAL STATUS PRIOR TO ADMISSION: Patient was modified independent using a rollator for functional mobility. HOME SUPPORT: The patient lived with spouse but did not require assist.    Occupational Therapy Goals  Initiated 12/31/2021  1. Patient will perform lower body dressing with supervision/set-up within 7 day(s). 2.  Patient will perform grooming with supervision/set-up within 7 day(s). 3.  Patient will perform toilet transfers with supervision/set-up within 7 day(s). 4.  Patient will perform all aspects of toileting with supervision/set-up within 7 day(s). Outcome: Progressing Towards Goal   OCCUPATIONAL THERAPY EVALUATION  Patient: Leann Atkinson (9 y.o. male)  Date: 12/31/2021  Primary Diagnosis: CVA (cerebral vascular accident) [facility]) [I63.9]        Precautions:   Fall    ASSESSMENT  Based on the objective data described below, the patient presents with hospital admission following episode of slurred speech and swallowing difficulty. Patient work up revealed small acute frontal infarct. Patient received in chair at end of PT session. He is pleasant and agreeable to additional activity. Patient participated in VoltDB motor assessment and noted with good ROM and UE strength. Patient able to perform tasks as instructed throughout assessment. Patient reports blurred vision however patient with cataract and planned removal on Jan 6 , now postponed. Patient reports other symptoms resolved and feels close to baseline. Current Level of Function Impacting Discharge (ADLs/self-care): CGA    Functional Outcome Measure: The patient scored 66/66 on the fugl tasha outcome measure. Other factors to consider for discharge: none     Patient will benefit from skilled therapy intervention to address the above noted impairments.        PLAN :  Recommendations and Planned Interventions: self care training, functional mobility training, therapeutic exercise, balance training, therapeutic activities, endurance activities, patient education, home safety training, and family training/education    Frequency/Duration: Patient will be followed by occupational therapy 5 times a week to address goals. Recommendation for discharge: (in order for the patient to meet his/her long term goals)  No skilled occupational therapy/ follow up rehabilitation needs identified at this time. This discharge recommendation:  Has been made in collaboration with the attending provider and/or case management    IF patient discharges home will need the following DME: none       SUBJECTIVE:   Patient stated I feel good.     OBJECTIVE DATA SUMMARY:   HISTORY:   Past Medical History:   Diagnosis Date    Arrhythmia     a fib on occasion    Arthritis     Autoimmune disease ([address]     Questionable Fibromyalgia    Cancer ([address]     basal cell on nose, ear and cheek    Chronic pain     Diabetes ([address]     Hypertension     Other and unspecified hyperlipidemia 4/7/2013    Other ill-defined conditions(799.89)     bilateral glaucoma    Other ill-defined conditions(799.89)     UC    Sleep apnea     Stool color black     Stroke ([facility] 11/2010 4/13, 6/15  TIA    UC (ulcerative colitis) ([facility] [address] 10/14/2009    Unspecified sleep apnea     on bipap     Past Surgical History:   Procedure Laterality Date    HX COLONOSCOPY  12/2014    HX ORTHOPAEDIC      left knee arthroscopy       Expanded or extensive additional review of patient history:     Home Situation  Home Environment: Private residence  # Steps to Enter: 3  One/Two Story Residence: Two story, live on 1st floor  Living Alone: No  Support Systems: Spouse/Significant Other,Other Family Member(s)  Patient Expects to be Discharged to[de-identified] House  Current DME Used/Available at Home: Walker, rollator,CPAP,Shower chair,Grab bars  Tub or Shower Type: Shower    Hand dominance: Right    EXAMINATION OF PERFORMANCE DEFICITS:  Cognitive/Behavioral Status:  Neurologic State: Alert  Orientation Level: Oriented X4  Cognition: Follows commands  Perception: Appears intact  Perseveration: No perseveration noted  Safety/Judgement: Awareness of environment    Skin: intact as seen    Edema: none noted     Hearing: Auditory  Auditory Impairment: None    Vision/Perceptual:                                     Range of Motion:  AROM: Within functional limits                         Strength:  Strength: Within functional limits                Coordination:  Coordination: Within functional limits            Tone & Sensation:  Tone: Normal  Sensation: Intact                      Balance:  Sitting: Intact  Standing: With support  Standing - Static: Good (fair without support)  Standing - Dynamic : Good (fair without support)    Functional Mobility and Transfers for ADLs:  Bed Mobility:  Supine to Sit: Additional time;Modified independent; Adaptive equipment  Scooting: Additional time;Modified independent    Transfers:  Sit to Stand: Supervision; Additional time (+proper rollator techniques)  Stand to Sit: Supervision; Additional time  Bed to Chair: Contact guard assistance  Toilet Transfer : Minimum assistance (low seat)  Assistive Device : Walker, rollator    ADL Assessment:  Feeding: Independent    Oral Facial Hygiene/Grooming: Setup    Bathing: Contact guard assistance    Upper Body Dressing: Contact guard assistance    Lower Body Dressing: Minimum assistance    Toileting: Contact guard assistance                ADL Intervention and task modifications:                                     Cognitive Retraining  Safety/Judgement: Awareness of environment    Therapeutic Exercise:     Functional Measure:  Fugl-Edge Assessment of Motor Recovery after Stroke:     Reflex Activity  Flexors/Biceps/Fingers: Can be elicited  Extensors/Triceps: Can be elicited  Reflex Subtotal: 4    Volitional Movement Within Synergies  Shoulder Retraction: Full  Shoulder Elevation: Full  Shoulder Abduction (90 degrees): Full  Shoulder External Rotation: Full  Elbow Flexion: Full  Forearm Supination: Full  Shoulder Adduction/Internal Rotation: Full  Elbow Extension: Full  Forearm Pronation: Full  Subtotal: 18    Volitional Movement Mixing Synergies  Hand to Lumbar Spine: Full  Shoulder Flexion (0-90 degrees): Full  Pronation-Supination: Full  Subtotal: 6    Volitional Movement With Little or No Synergy  Shoulder Abduction (0-90 degrees): Full  Shoulder Flexion ( degrees): Full  Pronation/Supination: Full  Subtotal : 6    Normal Reflex Activity  Biceps, Triceps, Finger Flexors: Full  Subtotal : 2    Upper Extremity Total   Upper Extremity Total: 36    Wrist  Stability at 15 Degree Dorsiflexion: Full  Repeated Dorsiflexion/ Volar Flexion: Full  Stability at 15 Degree Dorsiflexion: Full  Repeated Dorsiflexion/ Volar Flexion: Full  Circumduction: Full  Wrist Total: 10    Hand  Mass Flexion: Full  Mass Extension: Full  Grasp A: Full  Grasp B: Full  Grasp C: Full  Grasp D: Full  Grasp E: Full  Hand Total: 14    Coordination/Speed  Tremor: None  Dysmetria: None  Time: <1s  Coordination/Speed Total : 6    Total A-D  Total A-D (Motor Function): 66/66         This is a reliable/valid measure of arm function after a neurological event. It has established value to characterize functional status and for measuring spontaneous and therapy-induced recovery; tests proximal and distal motor functions. Fugl-Edge Assessment - UE scores recorded between five and 30 days post neurologic event can be used to predict UE recovery at six months post neurologic event. Severe = 0-21 points   Moderately Severe = 22-33 points   Moderate = 34-47 points   Mild = 48-66 points  MARLEE Holm, CORNELIUS Ritter, & BULL Kim (1992). Measurement of motor recovery after stroke: Outcome assessment and sample size requirements. Stroke, 23, pp. 9780-1005. --------------------------------------------------------------------------------------------------------------------------------------------------------------------  MCID:  Stroke:   Rogerio Horne et al, 2001; n = 171; mean age 79 (6) years; assessed within 16 (12) days of stroke, Acute Stroke)  FMA Motor Scores from Admission to Discharge   10 point increase in FMA Upper Extremity = 1.5 change in discharge FIM   10 point increase in FMA Lower Extremity = 1.9 change in discharge FIM  MDC:   Stroke:   Silvia Ayala et al, 2008, n = 14, mean age = 59.9 (14.6) years, assessed on average 14 (6.5) months post stroke, Chronic Stroke)   FMA = 5.2 points for the Upper Extremity portion of the assessment         Occupational Therapy Evaluation Charge Determination   History Examination Decision-Making   LOW Complexity : Brief history review  LOW Complexity : 1-3 performance deficits relating to physical, cognitive , or psychosocial skils that result in activity limitations and / or participation restrictions  LOW Complexity : No comorbidities that affect functional and no verbal or physical assistance needed to complete eval tasks       Based on the above components, the patient evaluation is determined to be of the following complexity level: LOW   Pain Rating:  None reported     Activity Tolerance:   Good    After treatment patient left in no apparent distress:    Sitting in chair, Call bell within reach, Bed / chair alarm activated, and Caregiver / family present    COMMUNICATION/EDUCATION:   The patients plan of care was discussed with: Physical therapist and Registered nurse. Home safety education was provided and the patient/caregiver indicated understanding., Patient/family have participated as able in goal setting and plan of care. , and Patient/family agree to work toward stated goals and plan of care. This patients plan of care is appropriate for delegation to [facility].     Thank you for this referral.  Cody Jordan Krishan, OTR/L  Time Calculation: 23 mins

## 2021-12-31 NOTE — ACP (ADVANCE CARE PLANNING)
Advance Care Planning   Advance Care Planning Inpatient Note  [address] [facility]    Today's Date: 12/31/2021  Unit: SFM 3 PROG CARE TELE 2    Received request from admission screening. Upon review of chart and communication with care team, patient's decision making abilities are not in question. Patient and Spouse was/were present in the room during visit. Goals of ACP Conversation:  Discuss Advance Care planning documents  Facilitate a discussion related to patient's goals of care as they align with the patient's values and beliefs    Health Care Decision Makers:      Primary Decision Maker: Justin Araujo - [phone]    Secondary Decision Maker: Jax Mcclellan - Daughter - [phone]      Summary:  Completed [address] [facility] [address] [place] (Patient Wishes) on file:  Healthcare Power of /Advance Directive appointment of Health care agent     Assessment:    Visited pt in response to an In Basket request for an AMD conversation. Pt names his wife Donnie Holloway ([phone]) as MPOA and his daughter Glory Beckwith ([phone]) as secondary. Interventions:  Provided education on documents for clarity and greater understanding  Discussed and provided education on state decision maker hierarchy  Assisted in the completion of documents according to patient's wishes at this time  Encouraged ongoing ACP conversation with future decision makers and loved ones    Care Preferences Communicated:    Hospitalization:  If the patient's health worsens and it becomes clear that the chance of recovery is unlikely,     the patient wants hospitalization. Ventilation:   If the patient, in their present state of health, suddenly became very ill and unable to breathe on their own,     the patient would desire the use of a ventilator (breathing machine).     If their health worsens and it becomes clear that the chance of recovery is unlikely,       the patient would desire the use of a ventilator (breathing machine). Resuscitation:  In the event the patient's heart stopped as a result of an underlying serious health condition, the patient communicates a preference for      resuscitative attempts (CPR).       Outcomes/Plan:  New Advance Directive completed  Returned original document(s) to patient, as well as copies for distribution to appointed agents  Copy of Advance Directive given to staff to scan into medical record  Teach Back Method used to verify the patient/Healthcare Decision Maker's understanding of key information in the advance directive documents    Isabel Narvaez [facility] on 12/31/2021 at 12:32 PM

## 2021-12-31 NOTE — CONSULTS
Neurology Consult - Inpatient      Name:   Evans Stacy  MRN:    [id number]    Date of Admission:  12/30/2021    Date of Consultation:  12/31/21       HISTORY OF PRESENT ILLNESS:     This is a 80 y.o. male with intermittent atrial fibrillation, arthritis, autoimmune disease, question of fibromyalgia, basal cell carcinoma, chronic pain, diabetes, hypertension, bilateral glaucoma, ulcer to colitis, sleep apnea, hematochezia, stroke (April 2013, June 2015 TIA), and a history of Charcot-Sierra-Tooth syndrome (patient and wife say diagnosed by Dr. Jhon Castellanos, 8 to 10 years ago). Neurology is asked to see the patient for stroke symptoms. Patient wife report that in the evening of 12/27/2021, patient was sitting on couch and developed sudden aphasia, words that make sense, appeared confused, could not figure out how to turn on the TV. Wife says she tried to get him to go to the ER but he declined. Patient then went to a preop exam for cataract surgery yesterday and told him about the TIA symptoms. They ordered stat MRI brain (outpatient study), which showed a small acute Fortical infarct in the right posterior frontal lobe. He was advised to come to the ER for further evaluation, treatment. Patient and wife say that the aphasia lasted about 4 hours, but there was mild confusion over the next couple days. Wife says patient is back to his normal level of alertness at present. CTA of the neck shows mild more than moderate extracranial cervical stenosis without evidence of flow-limiting stenosis. CT of the head multifocal mild to moderate atherosclerotic disease without evidence of flow-limiting stenosis or aneurysm. Labs: Glucose , BMP potassium 3.3 creatinine 1.32 GFR 51, CBC normal WBC 8.8, mild low hemoglobin 9.1, hematocrit 28.4, platelet count 597N. UA negative for UTI. Total cholesterol 269, HDL 39, .8. A1c 5.6.       Complete Review of Systems: reviewed on admission H&P    ====================================     Allergies   Allergen Reactions    Statins-Hmg-Coa Reductase Inhibitors Myalgia       Current Outpatient Medications   Medication Instructions    acetaminophen (TYLENOL ARTHRITIS PAIN) 650 mg, Oral, 2 TIMES DAILY AS NEEDED    benazepriL (LOTENSIN) 40 mg, DAILY    bimatoprost (LUMIGAN) 0.03 % ophthalmic drops 1 Drop, EVERY EVENING    cholecalciferol (VITAMIN D3) 1,000 Units, Oral, DAILY    clopidogreL (PLAVIX) 75 mg tab TAKE 1 TABLET DAILY    Coreg CR 10 mg, Oral, DAILY    cyanocobalamin (VITAMIN B-12) 1,000 mcg, Oral, DAILY    dorzolamide-timoloL (COSOPT) 22.3-6.8 mg/mL ophthalmic solution 1 Drop, Both Eyes, 2 TIMES DAILY    ezetimibe (ZETIA) 10 mg, Oral, DAILY    folic acid (FOLVITE) 1 mg tablet TAKE 1 TABLET DAILY    furosemide (LASIX) 20 mg, Oral, 2 TIMES DAILY    metFORMIN ER (GLUCOPHAGE XR) 500 mg, Oral, 2 TIMES DAILY    omega-3 fatty acids-vitamin e (FISH OIL) 1,000 mg Cap 1 Capsule, Oral, 2 TIMES DAILY    predniSONE (DELTASONE) 1 mg, Oral, DAILY WITH BREAKFAST    sulfaSALAzine (AZULFIDINE) 1,000 mg, Oral, 2 TIMES DAILY    turmeric-herbal complex no. 278 150 mg cap 1,000 mg, Oral    vit A,C,E-zinc-copper (PreserVision AREDS) cap capsule 1 Capsule, Oral, 2 TIMES DAILY       Current Facility-Administered Medications   Medication Dose Route Frequency Provider Last Rate Last Admin    lisinopriL (PRINIVIL, ZESTRIL) tablet 40 mg  40 mg Oral DAILY Connie PEREZ MD   40 mg at 12/31/21 0846    carvediloL (COREG) tablet 3.125 mg  3.125 mg Oral BID Bill Hernandez MD   3.125 mg at 12/31/21 4061    cholecalciferol (VITAMIN D3) (1000 Units /25 mcg) tablet 1,000 Units  1,000 Units Oral DAILY Bill Hernandez MD   1,000 Units at 12/31/21 0845    cyanocobalamin (VITAMIN B12) tablet 1,000 mcg  1,000 mcg Oral DAILY Bill Hernandez MD   1,000 mcg at 12/31/21 0846    dorzolamide-timoloL (COSOPT) 22.3-6.8 mg/mL ophthalmic solution 1 Drop 1 Drop Both Eyes BID Marily Neal MD   1 Drop at 12/31/21 0285    ezetimibe (ZETIA) tablet 10 mg  10 mg Oral DAILY Marily Neal MD   10 mg at 48/79/31 0180    folic acid (FOLVITE) tablet 1 mg  1 mg Oral DAILY Blaire Arana MD   1 mg at 12/31/21 0846    predniSONE (DELTASONE) tablet 1 mg  1 mg Oral DAILY WITH BREAKFAST Marily Neal MD   1 mg at 12/31/21 0846    acetaminophen (TYLENOL) tablet 650 mg  650 mg Oral Q4H PRN Marily Neal MD        Or   [place] acetaminophen (TYLENOL) solution 650 mg  650 mg Per NG tube Q4H PRN lBaire Arana MD        Or   [place] acetaminophen (TYLENOL) suppository 650 mg  650 mg Rectal Q4H PRN Blaire Arana MD        labetaloL (NORMODYNE;TRANDATE) 20 mg/4 mL (5 mg/mL) injection 5 mg  5 mg IntraVENous Q10MIN PRN Blaire Arana MD        insulin lispro (HUMALOG) injection   SubCUTAneous QID WITH MEALS Blaire Arana MD   2 Units at 12/31/21 6993    glucose chewable tablet 16 g  4 Tablet Oral PRN Marily Neal MD        dextrose (D50W) injection syrg 12.5-25 g  12.5-25 g IntraVENous PRN Blaire Arana MD        glucagon (GLUCAGEN) injection 1 mg  1 mg IntraMUSCular PRN Marily Neal MD        clopidogreL (PLAVIX) tablet 75 mg  75 mg Oral DAILY Marily Neal MD   75 mg at 12/31/21 6418    aspirin chewable tablet 81 mg  81 mg Oral DAILY Marily Neal MD   81 mg at 12/31/21 0846    latanoprost (XALATAN) 0.005 % ophthalmic solution 1 Drop  1 Drop Both Eyes QPM Blaire Arana MD   1 Drop at 12/30/21 2248    ondansetron (ZOFRAN) injection 6 mg  6 mg IntraVENous Q6H PRN Blaire Arana MD        sulfaSALAzine (AZULFIDINE) tablet 1,000 mg  1,000 mg Oral BID WITH MEALS Blaire Arana MD   1,000 mg at 12/31/21 1855       PSHx  has a past surgical history that includes hx orthopaedic and hx colonoscopy (12/2014). SocHx  reports that he has never smoked.  He has never used smokeless tobacco. He reports that he does not drink alcohol and does not use drugs. FHx family history includes Cancer in his brother and father; Dementia in his mother; Stroke in his brother, brother, and mother. PHYSICAL EXAM    Patient Vitals for the past 24 hrs:   Temp Pulse Resp BP SpO2   12/31/21 1017    (!) 147/80    12/31/21 0903  74  (!) 147/80 99 %   12/31/21 0700  68      12/31/21 0654 97.6 °F (36.4 °C) 70 18 137/82 100 %   12/31/21 0306  81      12/31/21 0302 98.1 °F (36.7 °C) 76 20 (!) 158/77 97 %   12/31/21 0153 98 °F (36.7 °C) 75 16 (!) 143/76 95 %   12/30/21 2358 98 °F (36.7 °C) 65 15 135/64 95 %   12/30/21 2128 98.1 °F (36.7 °C) 83 20 (!) 145/78 96 %   12/30/21 1539 96.9 °F (36.1 °C) (!) 105 16 (!) 158/84 95 %     Neurologic Exam:  Speech/ Language: No aphasia, no dysarthria, repeats without difficulty. Alertness: oriented person place and situation. CNs: Smell: not tested. Visual Fields (II): full to confrontation. Pupils (II): Not examined funduscopic: Not examined. Extraocular movements (III, IV, VI): conjugate in all directions, no PEMA. Ptosis (III, VII): none. Facial Sensation (V): Intact light touch in V1 V2 V3 on both sides. Facial Movements (VII): symmetric at rest and on activation. Hearing (VIII): normal.  Soft palate elevation (IX): Oropharynx not examined Shoulder shrug (XI): symmetric, strong. Tongue protrusion (XII): Midline    Motor: 5 out of 5 in all extremities. Sensory: Intact light touch in all extremities. Cerebellar: No resting postural or intention tremor. Normal finger-nose-finger bilateral..  Deep Tendon Reflexes: Trace biceps reflexes, absent patellar reflex. Plantar response: Not examined. Gait: Not examined. Romberg: Not examined      Labs/ Radiology     Imaging:    MRI BRAIN WO CONT    Result Date: 12/30/2021  1. Small acute cortical infarct right posterior frontal lobe.  2. Chronic age-related findings without significant change since 2015. 23X findings called to the office by me. CTA HEAD NECK W CONT    Result Date: 12/30/2021  CTA Head: 1. No evidence of flow-limiting stenosis or aneurysm. Scattered atherosclerotic disease as above. CTA Neck: 1. No evidence of flow-limiting stenosis. Scattered atherosclerotic disease as above. Assessment/ Plan       ICD-10-CM ICD-9-CM    1. Cerebrovascular accident (CVA), unspecified mechanism ([facility] [address]  I63.9 434.91    2. NSTEMI (non-ST elevated myocardial infarction) ([facility] [address]  I21.4 410.70      80 y.o. male with history of Charcot-Sierra-Tooth syndrome (diagnosed 8 to 10 years ago by Dr. Rebeca Weldon Neurologist)  Admitted for recent episode of aphasia lasting for hours, associated with confusion. Brain MRI shows small right posterior frontal cortical infarct  Patient is back to baseline and without any focal neurologic deficit    Agree with addition of aspirin 81 mg a day to his Plavix 75 mg a day. Patient reports statin intolerance when changed from a branded statin to a generic version. He cannot recall what branded statin he did tolerate. Recommended that he speak with PCP to find out what statin did not bother him, and then ask PCP to prescribe that brand. From a Neurology standpoint can be discharged today if BP improves and no significant abnormalities on TTE. Follow up with me in Neurology Clinic in 4 weeks      Thank you for asking the Neurology Service to evaluate Joselo Ross.       Signed By: Skylar Tubbs MD     December 31, 2021

## 2021-12-31 NOTE — PROGRESS NOTES
0200- TRANSFER - IN REPORT:    Verbal report received from [facility] (name) on Joselo Gallego  being received from ED(unit) for routine progression of care      Report consisted of patients Situation, Background, Assessment and   Recommendations(SBAR). Information from the following report(s) SBAR, Kardex, ED Summary, Intake/Output, MAR, Recent Results and Cardiac Rhythm NSR/ ST was reviewed with the receiving nurse. Opportunity for questions and clarification was provided. Assessment completed upon patients arrival to unit and care assumed. This patient was assisted with Intentional Toileting every 2 hours during this shift as appropriate. Documentation of ambulation and output reflected on Flowsheet as appropriate. Purposeful hourly rounding was completed using AIDET and 5Ps. Outcomes of PHR documented as they occurred. Bed alarm in use as appropriate. Dual Suction and ambubag in place. 3815- Lab called with critical Troponin if 1,470. Ashok Puente RN took critical result. 36- Dr. Amy Pickering about critical result. 7288- No new orders regarding critical lab result at this time. Stroke Education provided to patient and spouse/SO and the following topics were discussed    1. Patients personal risk factors for stroke are hypertension, hyperlipidemia, diabetes mellitus and prior stroke    2. Warning signs of Stroke:        * Sudden numbness or weakness of the face, arm or leg, especially on one side of          The body            * Sudden confusion, trouble speaking or understanding        * Sudden trouble seeing in one or both eyes        * Sudden trouble walking, dizziness, loss of balance or coordination        * Sudden severe headache with no known cause      3. Importance of activation Emergency Medical Services ( 9-1-1 ) immediately if experience any warning signs of stroke.     4. Be sure and schedule a follow-up appointment with your primary care doctor or any specialists as instructed. 5. You must take medicine every day to treat your risk factors for stroke. Be sure to take your medicines exactly as your doctor tells you: no more, no less. Know what your medicines are for , what they do. Anti-thrombotics /anticoagulants can help prevent strokes. You are taking the following medicine(s)  Aspirin, Plavix     6. Smoking and second-hand smoke greatly increase your risk of stroke, cardiovascular disease and death. Smoking history never    7. Information provided was [facility] or Verbal Education    8. Documentation of teaching completed in Patient Education Activity and on Care Plan with teaching response noted? yes    0700- Bedside and Verbal shift change report given to Tanya (oncoming nurse) by Shanna Obrien (offgoing nurse).  Report included the following information SBAR, Intake/Output, MAR, Recent Results and Cardiac Rhythm NSR/ ST.

## 2021-12-31 NOTE — PROGRESS NOTES
Problem: Falls - Risk of  Goal: *Absence of Falls  Description: Document [place] Fall Risk and appropriate interventions in the flowsheet.   Outcome: Progressing Towards Goal  Note: Fall Risk Interventions:  Mobility Interventions: Bed/chair exit alarm,Patient to call before getting OOB         Medication Interventions: Bed/chair exit alarm,Patient to call before getting OOB    Elimination Interventions: Bed/chair exit alarm,Call light in reach,Patient to call for help with toileting needs    History of Falls Interventions: Bed/chair exit alarm,Investigate reason for fall,Room close to nurse's station         Problem: Patient Education: Go to Patient Education Activity  Goal: Patient/Family Education  Outcome: Progressing Towards Goal     Problem: TIA/CVA Stroke: 0-24 hours  Goal: Off Pathway (Use only if patient is Off Pathway)  Outcome: Progressing Towards Goal  Goal: Activity/Safety  Outcome: Progressing Towards Goal  Goal: Diagnostic Test/Procedures  Outcome: Progressing Towards Goal  Goal: Nutrition/Diet  Outcome: Progressing Towards Goal  Goal: Respiratory  Outcome: Progressing Towards Goal

## 2021-12-31 NOTE — ED NOTES
TRANSFER - OUT REPORT:    Verbal report given to Karol(name) on Joselo Lainez  being transferred to [facility](unit) for routine progression of care       Report consisted of patients Situation, Background, Assessment and   Recommendations(SBAR). Information from the following report(s) SBAR, Kardex, ED Summary, Intake/Output, MAR, Recent Results and Cardiac Rhythm SR was reviewed with the receiving nurse. Lines:   Peripheral IV 12/30/21 Right Antecubital (Active)   Site Assessment Clean, dry, & intact 12/30/21 2100   Phlebitis Assessment 0 12/30/21 2100   Infiltration Assessment 0 12/30/21 2100   Dressing Status Clean, dry, & intact 12/30/21 2100   Dressing Type Tape;Transparent 12/30/21 2100   Hub Color/Line Status Pink;Flushed 12/30/21 2100        Opportunity for questions and clarification was provided.       Patient transported with:   Monitor  Registered Nurse

## 2021-12-31 NOTE — PROGRESS NOTES
Spiritual Care Assessment/Progress Note  [address]      NAME: Amari Galvez      MRN: [id number]  AGE: 80 y.o.  SEX: male  Quaker Affiliation: Confucianism   Language: English     12/31/2021     Total Time (in minutes): 68     Spiritual Assessment begun in [facility] [address] through conversation with:         [x]Patient        [x] Family    [] Friend(s)        Reason for Consult: Advance medical directive consult     Spiritual beliefs: (Please include comment if needed)     [] Identifies with a kerwin tradition:         [] Supported by a kerwin community:            [] Claims no spiritual orientation:           [] Seeking spiritual identity:                [] Adheres to an individual form of spirituality:           [x] Not able to assess:                           Identified resources for coping:      [] Prayer                               [] Music                  [] Guided Imagery     [] Family/friends                 [] Pet visits     [] Devotional reading                         [x] Unknown     [] Other:                                              Interventions offered during this visit: (See comments for more details)    Patient Interventions: Advance medical directive completed,Affirmation of emotions/emotional suffering     Family/Friend(s): Advance medical directive consult     Plan of Care:     [] Support spiritual and/or cultural needs    [] Support AMD and/or advance care planning process      [] Support grieving process   [] Coordinate Rites and/or Rituals    [] Coordination with community clergy   [] No spiritual needs identified at this time   [] Detailed Plan of Care below (See Comments)  [] Make referral to Music Therapy  [] Make referral to Pet Therapy     [] Make referral to Addiction services  [] Make referral to [facility]  [] Make referral to Spiritual Care Partner  [] No future visits requested        [x] Follow up visits as needed     Visited pt in response to an In Basket request for an AMD conversation.  Pt names his wife Vicente Nash ([phone]) as MPOA and his daughter Latasha Shine ([phone]) as secondary  Chaplain Grigsby MDiv, MS, [facility]

## 2021-12-31 NOTE — H&P
[facility]  [address], [place] [address]  [phone]    Admission History and Physical      NAME:  Claudia Gonzalez   :   1930   MRN:  [id number]     PCP:  Rigoberto Oliveros MD     Date/Time:  2021         Subjective:     CHIEF COMPLAINT: \"I was told to come here\"     HISTORY OF PRESENT ILLNESS:     Mr. Janeth Dangelo is a 80 y.o.  male with PMH of DM, XOL, HTN, UC admitted for stroke. Per report, pt was being evaluated pre-op for ocular surgery. He reported that on  had stroke like symptoms including slurred speech, swallowing difficulties and confusion. Per wife, last roughly 3 hours. Pt was sent for MRI and CVA noted on MRI prompting referral to the ED for admission    Pt currently asymptomatic.  Of note denies CP, SOB, N, V or cardiac symptoms    Past Medical History:   Diagnosis Date    Arrhythmia     a fib on occasion    Arthritis     Autoimmune disease ([address]     Questionable Fibromyalgia    Cancer ([address]     basal cell on nose, ear and cheek    Chronic pain     Diabetes ([address]     Hypertension     Other and unspecified hyperlipidemia 2013    Other ill-defined conditions(799.89)     bilateral glaucoma    Other ill-defined conditions(799.89)     UC    Sleep apnea     Stool color black     Stroke ([address] 2010, 6/15  TIA    UC (ulcerative colitis) ([address] 10/14/2009    Unspecified sleep apnea     on bipap        Past Surgical History:   Procedure Laterality Date    HX COLONOSCOPY  2014    HX ORTHOPAEDIC      left knee arthroscopy       Social History     Tobacco Use    Smoking status: Never Smoker    Smokeless tobacco: Never Used   Substance Use Topics    Alcohol use: No        Family History   Problem Relation Age of Onset    Stroke Mother     Dementia Mother     Cancer Father         pancreatic    Stroke Brother     Cancer Brother     Stroke Brother         Allergies   Allergen Reactions    Statins-Hmg-Coa Reductase Inhibitors Myalgia        Prior to Admission medications    Medication Sig Start Date End Date Taking? Authorizing Provider   acetaminophen (Tylenol Arthritis Pain) 650 mg TbER Take 650 mg by mouth two (2) times daily as needed for Pain. Yes Provider, Historical   cholecalciferol (Vitamin D3) (1000 Units /25 mcg) tablet Take 1,000 Units by mouth daily. Yes Provider, Historical   metFORMIN ER (GLUCOPHAGE XR) 500 mg tablet Take 500 mg by mouth two (2) times a day. Yes Provider, Historical   dorzolamide-timoloL (COSOPT) 22.3-6.8 mg/mL ophthalmic solution Administer 1 Drop to both eyes two (2) times a day. Yes Provider, Historical   folic acid (FOLVITE) 1 mg tablet TAKE 1 TABLET DAILY 12/4/21  Yes Carlo Sullivan MD   clopidogreL (PLAVIX) 75 mg tab TAKE 1 TABLET DAILY 11/17/21  Yes Carol Sullivan MD   predniSONE (DELTASONE) 1 mg tablet Take 1 Tablet by mouth daily (with breakfast). 10/19/21  Yes Carol Sullivan MD   vit A,C,E-zinc-copper (PreserVision AREDS) cap capsule Take 1 Capsule by mouth two (2) times a day. Yes Provider, Historical   sulfaSALAzine (AZULFIDINE) 500 mg tablet Take 2 Tablets by mouth two (2) times a day. 8/4/21  Yes Carol Sullivan MD   turmeric-herbal complex no. 278 150 mg cap Take 1,000 mg by mouth. Yes Provider, Historical   ezetimibe (ZETIA) 10 mg tablet Take 10 mg by mouth daily. 10/1/20  Yes Provider, Historical   furosemide (LASIX) 20 mg tablet Take 20 mg by mouth two (2) times a day. 8/17/16  Yes Provider, Historical   COREG CR 10 mg CR capsule Take 10 mg by mouth daily. 8/17/16  Yes Provider, Historical   cyanocobalamin (VITAMIN B-12) 1,000 mcg tablet Take 1,000 mcg by mouth daily. Yes Provider, Historical   omega-3 fatty acids-vitamin e (FISH OIL) 1,000 mg Cap Take 1 Capsule by mouth two (2) times a day. Yes Provider, Historical   benazepril (LOTENSIN) 40 mg tablet Take 40 mg by mouth daily.    Yes Provider, Historical   bimatoprost (LUMIGAN) 0.03 % ophthalmic drops Administer 1 Drop to both eyes every evening. Yes Provider, Historical         Review of Systems:  (bold if positive, if negative)    Gen:  Eyes:  ENT:  CVS:  Pulm:  GI:    :    MS:  Skin:  Psych:  Endo:    Hem:  Renal:    Neuro:     Slurred speech; resolved        Objective:      VITALS:    Vital signs reviewed; most recent are:    Visit Vitals  BP (!) 158/84 (BP 1 Location: Right upper arm, BP Patient Position: Sitting)   Pulse 83   Temp 98.1 °F (36.7 °C)   Resp 20   Ht 5' 8\" (1.727 m)   Wt 84.1 kg (185 lb 6.5 oz)   SpO2 96%   BMI 28.19 kg/m²     SpO2 Readings from Last 6 Encounters:   12/30/21 96%   12/30/21 95%   10/19/21 97%   09/21/21 97%   10/21/20 96%   10/16/19 95%        No intake or output data in the 24 hours ending 12/30/21 4046         Exam:     Physical Exam:    Gen:  Well-developed, well-nourished, in no acute distress  HEENT:  Pink conjunctivae, PERRL, hearing intact to voice, moist mucous membranes  Neck:  Supple, without masses, thyroid non-tender  Resp:  No accessory muscle use, clear breath sounds without wheezes rales or rhonchi  Card:  No murmurs, normal S1, S2 without thrills, bruits or peripheral edema  Abd:  Soft, non-tender, non-distended, normoactive bowel sounds are present, no palpable organomegaly  Lymph:  No cervical adenopathy  Musc:  No cyanosis or clubbing  Skin:  No rashes or ulcers, skin turgor is good  Neuro:  Cranial nerves 3-12 are grossly intact,  strength is 5/5 bilaterally, dorsi / plantarflexion strength is 5/5 bilaterally, follows commands appropriately  Psych:  Alert with good insight.   Oriented to person, place, and time       Labs:    Recent Labs     12/30/21  1638   WBC 8.8   HGB 9.9*   HCT 30.5*        Recent Labs     12/30/21  1638      K 4.3      CO2 27   *   BUN 30*   CREA 1.75*   CA 9.0   ALB 3.4*   ALT 11*     No components found for: GLPOC  No results for input(s): PH, PCO2, PO2, HCO3, FIO2 in the last 72 hours. Recent Labs     12/30/21  1638   INR 1.0     MRI brain =>   1. Small acute cortical infarct right posterior frontal lobe. 2. Chronic age-related findings without significant change since 2015. Assessment/Plan:     CVA (cerebral vascular accident) ([facility] [address] (12/30/2021) - noted on MRI. No LVO on CTA  -admit   -check TTE   -start ASA in addition to plavix; pt took plavix this AM at home   -check A1c, LDL   -previously intolerant of statins   -PT/OT/speech eval   -serial neurochecks   -permissive HTN   -monitor on tele; if e/o a-fib may need NOAC       Elevated troponin (12/30/2021) - suspect 2/2 CKD and CVA.  No cardiac symptoms   -as above, on ASA/plavix   -trend CE's   -check TTE      CKD  -roughly at baseline       Essential hypertension, benign (10/14/2009)  -permissive HTN for now   -PRN labetolol       UC (ulcerative colitis) ([facility] [address] (10/14/2009)  -continue sulfasalazine      Hyperlipidemia LDL goal <100 (4/7/2013)  -intolerance to statins       PMR (polymyalgia rheumatica) ([facility] [address] (5/4/2016)  -on steroids  -PT/OT eval       Sleep apnea (5/4/2016)  -CPAP PRN     Surrogate decision maker: Wife; pt is FULL CODE     Total time spent with patient: 79 [address] discussed with: Patient and Family    Discussed:  Care Plan    Prophylaxis:  SCD's    Probable Disposition:  Home w/Family           ___________________________________________________    Attending Physician: Avis Martinez MD

## 2021-12-31 NOTE — PROGRESS NOTES
Problem: Mobility Impaired (Adult and Pediatric)  Goal: *Acute Goals and Plan of Care (Insert Text)  Description: FUNCTIONAL STATUS PRIOR TO ADMISSION: Patient was modified independent using a rollator for functional mobility. HOME SUPPORT PRIOR TO ADMISSION: The patient lived with spouse but did not require assist.    Physical Therapy Goals  Initiated 12/31/2021  1. Patient will move from supine to sit and sit to supine  in bed with modified independence within 7 day(s). 2.  Patient will transfer from bed to chair and chair to bed with modified independence using the least restrictive device within 7 day(s). 3.  Patient will perform sit to stand with modified independence within 7 day(s). 4.  Patient will ambulate with modified independence for 150 feet with the least restrictive device within 7 day(s). 5.  Patient will ascend/descend 3 stairs with one handrail(s) with modified independence within 7 day(s). 6.  Patient will increase score on Reed Balance Assessment by at least 4 points within 7 days. Outcome: Not Met     PHYSICAL THERAPY EVALUATION  Patient: Lesli Turner (26 y.o. male)  Date: 12/31/2021  Primary Diagnosis: CVA (cerebral vascular accident) [facility]) [I63.9]        Precautions:   Fall    ASSESSMENT  Based on the objective data described below, the patient presents with generally decreased strength and impaired balance with presentation very near if not at his reported baseline. Pt admitted 12/30/21 after pre-op work-up revealed stroke-like symptoms and MRI revealed R frontal infarct. Today pt reports prior symptoms of speech and swallow difficulties have completely resolved and he states mobility is consistent with his baseline. He tolerates activity without complaints and demonstrates appropriate safety awareness and considerations. He does score within high fall risk category and would likely benefit from outpatient PT for balance training.     Current Level of Function Impacting Discharge (mobility/balance): supervision    Functional Outcome Measure: The patient scored 14 on the Reed Balance Assessment outcome measure which is indicative of high fall risk. Other factors to consider for discharge: none additional      Patient will benefit from skilled therapy intervention to address the above noted impairments. PLAN :  Recommendations and Planned Interventions: bed mobility training, transfer training, gait training, therapeutic exercises, neuromuscular re-education, modalities, patient and family training/education, and therapeutic activities      Frequency/Duration: Patient will be followed by physical therapy:  5 times a week to address goals. Recommendation for discharge: (in order for the patient to meet his/her long term goals)  Outpatient physical therapy follow up recommended for high level balance    This discharge recommendation:  Has not yet been discussed the attending provider and/or case management    IF patient discharges home will need the following DME: none         SUBJECTIVE:   Patient stated I'm always careful to hold onto the bar with a hand that isn't soapy, re: shower safety. 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 Pt received supine, agreeable to PT and cleared by RN.     OBJECTIVE DATA SUMMARY:   HISTORY:    Past Medical History:   Diagnosis Date    Arrhythmia     a fib on occasion    Arthritis     Autoimmune disease ([address]     Questionable Fibromyalgia    Cancer ([address]     basal cell on nose, ear and cheek    Chronic pain     Diabetes ([address]     Hypertension     Other and unspecified hyperlipidemia 4/7/2013    Other ill-defined conditions(799.89)     bilateral glaucoma    Other ill-defined conditions(799.89)     UC    Sleep apnea     Stool color black     Stroke [facility]) 11/2010 4/13, 6/15  TIA    UC (ulcerative colitis) ([address] 10/14/2009    Unspecified sleep apnea     on bipap     Past Surgical History:   Procedure Laterality Date  HX COLONOSCOPY  12/2014    HX ORTHOPAEDIC      left knee arthroscopy       Personal factors and/or comorbidities impacting plan of care: as above    Home Situation  Home Environment: Private residence  # Steps to Enter: 3  One/Two Story Residence: Two story, live on 1st floor  Living Alone: No  Support Systems: Spouse/Significant Other,Other Family Member(s)  Patient Expects to be Discharged to[de-identified] House  Current DME Used/Available at Home: Walker, rollator,CPAP,Shower chair,Grab bars  Tub or Shower Type: Shower    EXAMINATION/PRESENTATION/DECISION MAKING:   Critical Behavior:  Neurologic State: Alert,Eyes open spontaneously  Orientation Level: Oriented X4  Cognition: Follows commands     Hearing: Auditory  Auditory Impairment: None  Skin:  LE exposed skin intact  Edema: slight pitting distal LLE; no tenderness to palpation  Range Of Motion:  AROM: Within functional limits                       Strength:    Strength: Within functional limits                    Tone & Sensation:   Tone: Normal              Sensation: Intact               Coordination:  Coordination: Within functional limits  Vision:      Functional Mobility:  Bed Mobility:     Supine to Sit: Additional time;Modified independent; Adaptive equipment     Scooting: Additional time;Modified independent  Transfers:  Sit to Stand: Supervision; Additional time (+proper rollator techniques)  Stand to Sit: Supervision; Additional time                       Balance:   Sitting: Intact  Standing: With support  Standing - Static: Good (fair without support)  Standing - Dynamic : Good (fair without support)  Ambulation/Gait Training:  Distance (ft): 80 Feet (ft)  Assistive Device: Walker, rollator;Gait belt  Ambulation - Level of Assistance: Supervision        Gait Abnormalities: Decreased step clearance              Speed/Betsy: Pace decreased (<100 feet/min)                     No loss of balance; appropriate device techniques and obstacle negotiation. Functional Measure:  Alcantar Balance Test:    Sitting to Standin  Standing Unsupported: 3  Sitting with Back Unsupported: 4  Standing to Sittin  Transfers: 2  Standing Unsupported with Eyes Closed: 1  Standing Unsupported with Feet Together: 0  Reach Forward with Outstretched Arm: 0   Object: 0  Turn to Look Over Shoulders: 0  Turn 360 Degrees: 0  Alternate Foot on Step/Stool: 0  Standing Unsupported One Foot in Front: 0  Stand on One Le  Total: 14/56         56=Maximum possible score;   0-20=High fall risk  21-40=Moderate fall risk   41-56=Low fall risk                Physical Therapy Evaluation Charge Determination   History Examination Presentation Decision-Making   HIGH Complexity :3+ comorbidities / personal factors will impact the outcome/ POC  HIGH Complexity : 4+ Standardized tests and measures addressing body structure, function, activity limitation and / or participation in recreation  LOW Complexity : Stable, uncomplicated  Other outcome measures alcantar  HIGH       Based on the above components, the patient evaluation is determined to be of the following complexity level: LOW     Pain Rating:  Denies pain    Activity Tolerance:   Good    After treatment patient left in no apparent distress:   Sitting in chair, Call bell within reach, and OT present and agrees to activate chair alarm after OT eval.    COMMUNICATION/EDUCATION:   The patients plan of care was discussed with: Occupational therapist and Registered nurse. Fall prevention education was provided and the patient/caregiver indicated understanding., Patient/family have participated as able in goal setting and plan of care. , and Patient/family agree to work toward stated goals and plan of care. Patient verbalized understanding regarding signs/symptoms of CVA and appropriate responses should symptoms arise.     Thank you for this referral.  Stas Salinas, PT, DPT   Time Calculation: 33 mins

## 2022-01-01 ENCOUNTER — NURSE TRIAGE (OUTPATIENT)
Dept: OTHER | Facility: CLINIC | Age: 87
End: 2022-01-01

## 2022-01-01 ENCOUNTER — HOME CARE VISIT (OUTPATIENT)
Dept: HOSPICE | Facility: HOSPICE | Age: 87
End: 2022-01-01
Payer: MEDICARE

## 2022-01-01 ENCOUNTER — HOSPICE ADMISSION (OUTPATIENT)
Dept: HOSPICE | Facility: HOSPICE | Age: 87
End: 2022-01-01
Payer: MEDICARE

## 2022-01-01 ENCOUNTER — HOSPITAL ENCOUNTER (INPATIENT)
Age: 87
LOS: 1 days | Discharge: HOSPICE | DRG: 871 | End: 2022-02-04
Attending: STUDENT IN AN ORGANIZED HEALTH CARE EDUCATION/TRAINING PROGRAM | Admitting: INTERNAL MEDICINE
Payer: MEDICARE

## 2022-01-01 ENCOUNTER — HOME CARE VISIT (OUTPATIENT)
Dept: SCHEDULING | Facility: HOME HEALTH | Age: 87
End: 2022-01-01
Payer: MEDICARE

## 2022-01-01 ENCOUNTER — APPOINTMENT (OUTPATIENT)
Dept: CARDIOLOGY CLINIC | Age: 87
End: 2022-01-01

## 2022-01-01 ENCOUNTER — HOSPITAL ENCOUNTER (EMERGENCY)
Age: 87
Discharge: HOME | End: 2022-01-06
Attending: EMERGENCY MEDICINE
Payer: COMMERCIAL

## 2022-01-01 ENCOUNTER — TELEPHONE (OUTPATIENT)
Dept: CARDIOLOGY CLINIC | Age: 87
End: 2022-01-01

## 2022-01-01 ENCOUNTER — OFFICE VISIT (OUTPATIENT)
Dept: INTERNAL MEDICINE CLINIC | Age: 87
End: 2022-01-01
Payer: MEDICARE

## 2022-01-01 ENCOUNTER — OFFICE VISIT (OUTPATIENT)
Dept: NEUROLOGY | Age: 87
End: 2022-01-01
Payer: COMMERCIAL

## 2022-01-01 ENCOUNTER — APPOINTMENT (OUTPATIENT)
Dept: NON INVASIVE DIAGNOSTICS | Age: 87
DRG: 871 | End: 2022-01-01
Attending: INTERNAL MEDICINE
Payer: MEDICARE

## 2022-01-01 ENCOUNTER — TELEPHONE (OUTPATIENT)
Dept: NEUROLOGY | Age: 87
End: 2022-01-01

## 2022-01-01 ENCOUNTER — OFFICE VISIT (OUTPATIENT)
Dept: CARDIOLOGY CLINIC | Age: 87
End: 2022-01-01
Payer: MEDICARE

## 2022-01-01 ENCOUNTER — HOSPITAL ENCOUNTER (INPATIENT)
Age: 87
LOS: 5 days | End: 2022-03-08
Attending: FAMILY MEDICINE | Admitting: FAMILY MEDICINE

## 2022-01-01 ENCOUNTER — TELEPHONE (OUTPATIENT)
Dept: INTERNAL MEDICINE CLINIC | Age: 87
End: 2022-01-01

## 2022-01-01 ENCOUNTER — APPOINTMENT (OUTPATIENT)
Dept: GENERAL RADIOLOGY | Age: 87
DRG: 871 | End: 2022-01-01
Attending: STUDENT IN AN ORGANIZED HEALTH CARE EDUCATION/TRAINING PROGRAM
Payer: MEDICARE

## 2022-01-01 VITALS — SYSTOLIC BLOOD PRESSURE: 132 MMHG | RESPIRATION RATE: 18 BRPM | HEART RATE: 82 BPM | DIASTOLIC BLOOD PRESSURE: 76 MMHG

## 2022-01-01 VITALS
HEART RATE: 101 BPM | DIASTOLIC BLOOD PRESSURE: 80 MMHG | SYSTOLIC BLOOD PRESSURE: 124 MMHG | HEIGHT: 68 IN | WEIGHT: 181 LBS | BODY MASS INDEX: 27.43 KG/M2 | OXYGEN SATURATION: 98 %

## 2022-01-01 VITALS
SYSTOLIC BLOOD PRESSURE: 124 MMHG | TEMPERATURE: 97.1 F | DIASTOLIC BLOOD PRESSURE: 72 MMHG | HEART RATE: 92 BPM | RESPIRATION RATE: 20 BRPM

## 2022-01-01 VITALS
RESPIRATION RATE: 30 BRPM | SYSTOLIC BLOOD PRESSURE: 101 MMHG | HEART RATE: 115 BPM | OXYGEN SATURATION: 83 % | TEMPERATURE: 99.4 F | DIASTOLIC BLOOD PRESSURE: 67 MMHG

## 2022-01-01 VITALS
HEART RATE: 92 BPM | SYSTOLIC BLOOD PRESSURE: 79 MMHG | DIASTOLIC BLOOD PRESSURE: 61 MMHG | TEMPERATURE: 97.3 F | RESPIRATION RATE: 16 BRPM

## 2022-01-01 VITALS
OXYGEN SATURATION: 92 % | RESPIRATION RATE: 25 BRPM | HEIGHT: 68 IN | HEART RATE: 102 BPM | BODY MASS INDEX: 27.43 KG/M2 | SYSTOLIC BLOOD PRESSURE: 142 MMHG | WEIGHT: 181 LBS | DIASTOLIC BLOOD PRESSURE: 95 MMHG | TEMPERATURE: 98.8 F

## 2022-01-01 VITALS
WEIGHT: 189 LBS | OXYGEN SATURATION: 96 % | HEART RATE: 104 BPM | DIASTOLIC BLOOD PRESSURE: 90 MMHG | BODY MASS INDEX: 28.64 KG/M2 | SYSTOLIC BLOOD PRESSURE: 155 MMHG | TEMPERATURE: 98.6 F | HEIGHT: 68 IN | RESPIRATION RATE: 16 BRPM

## 2022-01-01 VITALS
OXYGEN SATURATION: 97 % | SYSTOLIC BLOOD PRESSURE: 96 MMHG | RESPIRATION RATE: 27 BRPM | TEMPERATURE: 97.1 F | DIASTOLIC BLOOD PRESSURE: 84 MMHG | HEART RATE: 74 BPM

## 2022-01-01 VITALS
HEIGHT: 68 IN | WEIGHT: 188 LBS | OXYGEN SATURATION: 95 % | HEART RATE: 78 BPM | SYSTOLIC BLOOD PRESSURE: 136 MMHG | DIASTOLIC BLOOD PRESSURE: 76 MMHG | BODY MASS INDEX: 28.49 KG/M2 | RESPIRATION RATE: 16 BRPM

## 2022-01-01 VITALS
OXYGEN SATURATION: 97 % | HEART RATE: 95 BPM | DIASTOLIC BLOOD PRESSURE: 55 MMHG | SYSTOLIC BLOOD PRESSURE: 126 MMHG | RESPIRATION RATE: 20 BRPM

## 2022-01-01 VITALS
TEMPERATURE: 98.6 F | HEART RATE: 96 BPM | DIASTOLIC BLOOD PRESSURE: 65 MMHG | SYSTOLIC BLOOD PRESSURE: 100 MMHG | RESPIRATION RATE: 18 BRPM

## 2022-01-01 VITALS
RESPIRATION RATE: 16 BRPM | TEMPERATURE: 97.7 F | WEIGHT: 185 LBS | SYSTOLIC BLOOD PRESSURE: 168 MMHG | OXYGEN SATURATION: 99 % | HEART RATE: 70 BPM | HEIGHT: 68 IN | BODY MASS INDEX: 28.04 KG/M2 | DIASTOLIC BLOOD PRESSURE: 75 MMHG

## 2022-01-01 VITALS — SYSTOLIC BLOOD PRESSURE: 116 MMHG | TEMPERATURE: 97.7 F | DIASTOLIC BLOOD PRESSURE: 66 MMHG | HEART RATE: 80 BPM

## 2022-01-01 VITALS — HEART RATE: 84 BPM | RESPIRATION RATE: 20 BRPM | DIASTOLIC BLOOD PRESSURE: 64 MMHG | SYSTOLIC BLOOD PRESSURE: 104 MMHG

## 2022-01-01 VITALS
TEMPERATURE: 98 F | HEART RATE: 83 BPM | RESPIRATION RATE: 20 BRPM | SYSTOLIC BLOOD PRESSURE: 118 MMHG | DIASTOLIC BLOOD PRESSURE: 70 MMHG

## 2022-01-01 DIAGNOSIS — Z01.818 PRE-OP TESTING: ICD-10-CM

## 2022-01-01 DIAGNOSIS — I63.9 CEREBROVASCULAR ACCIDENT (CVA), UNSPECIFIED MECHANISM (HCC): Primary | ICD-10-CM

## 2022-01-01 DIAGNOSIS — R65.20 SEPSIS WITH ACUTE HYPOXIC RESPIRATORY FAILURE WITHOUT SEPTIC SHOCK, DUE TO UNSPECIFIED ORGANISM (HCC): Primary | ICD-10-CM

## 2022-01-01 DIAGNOSIS — Z71.89 GOALS OF CARE, COUNSELING/DISCUSSION: ICD-10-CM

## 2022-01-01 DIAGNOSIS — J96.01 ACUTE RESPIRATORY FAILURE WITH HYPOXIA (HCC): ICD-10-CM

## 2022-01-01 DIAGNOSIS — M35.3 PMR (POLYMYALGIA RHEUMATICA) (HCC): ICD-10-CM

## 2022-01-01 DIAGNOSIS — R06.02 SHORTNESS OF BREATH: ICD-10-CM

## 2022-01-01 DIAGNOSIS — I50.33 ACUTE ON CHRONIC HEART FAILURE WITH PRESERVED EJECTION FRACTION (HFPEF) (HCC): ICD-10-CM

## 2022-01-01 DIAGNOSIS — Z71.1 CONCERN ABOUT END OF LIFE: ICD-10-CM

## 2022-01-01 DIAGNOSIS — I63.9 CEREBROVASCULAR ACCIDENT (CVA), UNSPECIFIED MECHANISM (HCC): ICD-10-CM

## 2022-01-01 DIAGNOSIS — R45.1 RESTLESSNESS: ICD-10-CM

## 2022-01-01 DIAGNOSIS — I15.9 SECONDARY HYPERTENSION: Primary | ICD-10-CM

## 2022-01-01 DIAGNOSIS — J96.01 ACUTE HYPOXEMIC RESPIRATORY FAILURE (HCC): ICD-10-CM

## 2022-01-01 DIAGNOSIS — N18.32 STAGE 3B CHRONIC KIDNEY DISEASE (HCC): ICD-10-CM

## 2022-01-01 DIAGNOSIS — E78.5 HYPERLIPIDEMIA LDL GOAL <100: ICD-10-CM

## 2022-01-01 DIAGNOSIS — I35.0 SEVERE AORTIC STENOSIS: Primary | ICD-10-CM

## 2022-01-01 DIAGNOSIS — Z51.5 HOSPICE CARE: ICD-10-CM

## 2022-01-01 DIAGNOSIS — R53.1 WEAKNESS GENERALIZED: ICD-10-CM

## 2022-01-01 DIAGNOSIS — A41.9 SEPSIS WITH ACUTE HYPOXIC RESPIRATORY FAILURE WITHOUT SEPTIC SHOCK, DUE TO UNSPECIFIED ORGANISM (HCC): Primary | ICD-10-CM

## 2022-01-01 DIAGNOSIS — J96.01 SEPSIS WITH ACUTE HYPOXIC RESPIRATORY FAILURE WITHOUT SEPTIC SHOCK, DUE TO UNSPECIFIED ORGANISM (HCC): Primary | ICD-10-CM

## 2022-01-01 DIAGNOSIS — I10 ESSENTIAL HYPERTENSION, BENIGN: ICD-10-CM

## 2022-01-01 DIAGNOSIS — Z51.5 PALLIATIVE CARE ENCOUNTER: ICD-10-CM

## 2022-01-01 DIAGNOSIS — E87.1 HYPONATREMIA: ICD-10-CM

## 2022-01-01 DIAGNOSIS — K51.919 ULCERATIVE COLITIS WITH COMPLICATION, UNSPECIFIED LOCATION (HCC): ICD-10-CM

## 2022-01-01 DIAGNOSIS — R73.03 PREDIABETES: ICD-10-CM

## 2022-01-01 DIAGNOSIS — R73.9 HYPERGLYCEMIA: ICD-10-CM

## 2022-01-01 DIAGNOSIS — I67.2 CEREBRAL ATHEROSCLEROSIS: ICD-10-CM

## 2022-01-01 DIAGNOSIS — I35.0 SEVERE AORTIC VALVE STENOSIS: ICD-10-CM

## 2022-01-01 DIAGNOSIS — J18.9 PNEUMONIA OF BOTH LUNGS DUE TO INFECTIOUS ORGANISM, UNSPECIFIED PART OF LUNG: ICD-10-CM

## 2022-01-01 DIAGNOSIS — Z71.89 DNR (DO NOT RESUSCITATE) DISCUSSION: ICD-10-CM

## 2022-01-01 LAB
ALBUMIN SERPL-MCNC: 2.9 G/DL (ref 3.5–5)
ALBUMIN SERPL-MCNC: 3.5 G/DL (ref 3.5–5)
ALBUMIN/GLOB SERPL: 0.7 {RATIO} (ref 1.1–2.2)
ALBUMIN/GLOB SERPL: 0.8 {RATIO} (ref 1.1–2.2)
ALP SERPL-CCNC: 61 U/L (ref 45–117)
ALP SERPL-CCNC: 73 U/L (ref 45–117)
ALT SERPL-CCNC: 12 U/L (ref 12–78)
ALT SERPL-CCNC: 8 U/L (ref 12–78)
ANION GAP SERPL CALC-SCNC: 10 MMOL/L (ref 5–15)
ANION GAP SERPL CALC-SCNC: 10 MMOL/L (ref 5–15)
ANION GAP SERPL CALC-SCNC: 7 MMOL/L (ref 5–15)
APPEARANCE UR: ABNORMAL
AST SERPL-CCNC: 16 U/L (ref 15–37)
AST SERPL-CCNC: 19 U/L (ref 15–37)
ATRIAL RATE: 108 BPM
ATRIAL RATE: 80 BPM
ATRIAL RATE: 81 BPM
ATRIAL RATE: 87 BPM
B PERT DNA SPEC QL NAA+PROBE: NOT DETECTED
BACTERIA SPEC CULT: NORMAL
BACTERIA URNS QL MICRO: NEGATIVE /HPF
BASOPHILS # BLD: 0.1 K/UL (ref 0–0.1)
BASOPHILS NFR BLD: 0 % (ref 0–1)
BASOPHILS NFR BLD: 1 % (ref 0–1)
BASOPHILS NFR BLD: 1 % (ref 0–1)
BILIRUB SERPL-MCNC: 0.3 MG/DL (ref 0.2–1)
BILIRUB SERPL-MCNC: 0.9 MG/DL (ref 0.2–1)
BILIRUB UR QL: NEGATIVE
BNP SERPL-MCNC: 2628 PG/ML
BNP SERPL-MCNC: ABNORMAL PG/ML
BNP SERPL-MCNC: ABNORMAL PG/ML
BORDETELLA PARAPERTUSSIS PCR, BORPAR: NOT DETECTED
BUN SERPL-MCNC: 20 MG/DL (ref 6–20)
BUN SERPL-MCNC: 31 MG/DL (ref 6–20)
BUN SERPL-MCNC: 34 MG/DL (ref 6–20)
BUN/CREAT SERPL: 12 (ref 12–20)
BUN/CREAT SERPL: 20 (ref 12–20)
BUN/CREAT SERPL: 24 (ref 12–20)
C PNEUM DNA SPEC QL NAA+PROBE: NOT DETECTED
CALCIUM SERPL-MCNC: 8.9 MG/DL (ref 8.5–10.1)
CALCIUM SERPL-MCNC: 9.1 MG/DL (ref 8.5–10.1)
CALCIUM SERPL-MCNC: 9.2 MG/DL (ref 8.5–10.1)
CALCULATED P AXIS, ECG09: 32 DEGREES
CALCULATED P AXIS, ECG09: 41 DEGREES
CALCULATED P AXIS, ECG09: 6 DEGREES
CALCULATED P AXIS, ECG09: 77 DEGREES
CALCULATED R AXIS, ECG10: -2 DEGREES
CALCULATED R AXIS, ECG10: -8 DEGREES
CALCULATED R AXIS, ECG10: -8 DEGREES
CALCULATED R AXIS, ECG10: 3 DEGREES
CALCULATED T AXIS, ECG11: -31 DEGREES
CALCULATED T AXIS, ECG11: -32 DEGREES
CALCULATED T AXIS, ECG11: -39 DEGREES
CALCULATED T AXIS, ECG11: -8 DEGREES
CHLORIDE SERPL-SCNC: 104 MMOL/L (ref 97–108)
CHLORIDE SERPL-SCNC: 94 MMOL/L (ref 97–108)
CHLORIDE SERPL-SCNC: 97 MMOL/L (ref 97–108)
CO2 SERPL-SCNC: 23 MMOL/L (ref 21–32)
CO2 SERPL-SCNC: 24 MMOL/L (ref 21–32)
CO2 SERPL-SCNC: 26 MMOL/L (ref 21–32)
COLOR UR: ABNORMAL
COMMENT, HOLDF: NORMAL
COVID-19 RAPID TEST, COVR: NOT DETECTED
CREAT SERPL-MCNC: 1.41 MG/DL (ref 0.7–1.3)
CREAT SERPL-MCNC: 1.54 MG/DL (ref 0.7–1.3)
CREAT SERPL-MCNC: 1.63 MG/DL (ref 0.7–1.3)
DIAGNOSIS, 93000: NORMAL
DIFFERENTIAL METHOD BLD: ABNORMAL
ECHO AO ASC DIAM: 3.6 CM
ECHO AO ASCENDING AORTA INDEX: 1.84 CM/M2
ECHO AR MAX VEL PISA: 3.9 M/S
ECHO AV AREA PEAK VELOCITY: 0.4 CM2
ECHO AV AREA PEAK VELOCITY: 0.4 CM2
ECHO AV AREA PEAK VELOCITY: 0.5 CM2
ECHO AV AREA VTI: 0.5 CM2
ECHO AV AREA/BSA VTI: 0.3 CM2/M2
ECHO AV MEAN GRADIENT: 64 MMHG
ECHO AV MEAN VELOCITY: 3.8 M/S
ECHO AV PEAK GRADIENT: 99 MMHG
ECHO AV PEAK VELOCITY: 5 M/S
ECHO AV REGURGITANT PHT: 277.9 MILLISECOND
ECHO AV VELOCITY RATIO: 0.12
ECHO AV VTI: 85.3 CM
ECHO EST RA PRESSURE: 8 MMHG
ECHO LA DIAMETER INDEX: 1.73 CM/M2
ECHO LA DIAMETER: 3.4 CM
ECHO LA VOL 2C: 24 ML (ref 18–58)
ECHO LA VOL 4C: 26 ML (ref 18–58)
ECHO LA VOL BP: 26 ML (ref 18–58)
ECHO LA VOL BP: 26 ML (ref 18–58)
ECHO LA VOLUME AREA LENGTH: 32 ML
ECHO LA VOLUME INDEX A2C: 12 ML/M2 (ref 16–34)
ECHO LA VOLUME INDEX A4C: 13 ML/M2 (ref 16–34)
ECHO LA VOLUME INDEX AREA LENGTH: 16 ML/M2 (ref 16–34)
ECHO LV E' LATERAL VELOCITY: 9 CM/S
ECHO LV E' SEPTAL VELOCITY: 5 CM/S
ECHO LV EDV A2C: 84 ML
ECHO LV EDV A4C: 106 ML
ECHO LV EDV BP: 97 ML (ref 67–155)
ECHO LV EDV BP: 97 ML (ref 67–155)
ECHO LV EDV INDEX A4C: 54 ML/M2
ECHO LV EDV NDEX A2C: 43 ML/M2
ECHO LV EJECTION FRACTION A2C: 46 %
ECHO LV EJECTION FRACTION A4C: 49 %
ECHO LV EJECTION FRACTION BIPLANE: 47 % (ref 55–100)
ECHO LV EJECTION FRACTION BIPLANE: 47 % (ref 55–100)
ECHO LV ESV A2C: 45 ML
ECHO LV ESV A4C: 55 ML
ECHO LV ESV BP: 51 ML (ref 22–58)
ECHO LV ESV INDEX A2C: 23 ML/M2
ECHO LV ESV INDEX A4C: 28 ML/M2
ECHO LV ESV INDEX BP: 26 ML/M2
ECHO LV FRACTIONAL SHORTENING: 29 % (ref 28–44)
ECHO LV INTERNAL DIMENSION DIASTOLE INDEX: 2.09 CM/M2
ECHO LV INTERNAL DIMENSION DIASTOLIC: 4.1 CM (ref 4.2–5.9)
ECHO LV INTERNAL DIMENSION SYSTOLIC INDEX: 1.48 CM/M2
ECHO LV INTERNAL DIMENSION SYSTOLIC: 2.9 CM
ECHO LV IVSD: 0.9 CM (ref 0.6–1)
ECHO LV MASS 2D: 114.1 G (ref 88–224)
ECHO LV MASS INDEX 2D: 58.2 G/M2 (ref 49–115)
ECHO LV POSTERIOR WALL DIASTOLIC: 0.9 CM (ref 0.6–1)
ECHO LV RELATIVE WALL THICKNESS RATIO: 0.44
ECHO LVOT AREA: 3.1 CM2
ECHO LVOT AV VTI INDEX: 0.18
ECHO LVOT DIAM: 2 CM
ECHO LVOT MEAN GRADIENT: 1 MMHG
ECHO LVOT PEAK GRADIENT: 2 MMHG
ECHO LVOT PEAK VELOCITY: 0.6 M/S
ECHO LVOT STROKE VOLUME INDEX: 24.4 ML/M2
ECHO LVOT SV: 47.7 ML
ECHO LVOT VTI: 15.2 CM
ECHO MV A VELOCITY: 1.08 M/S
ECHO MV E DECELERATION TIME (DT): 171.3 MS
ECHO MV E VELOCITY: 0.82 M/S
ECHO MV E/A RATIO: 0.76
ECHO MV E/E' LATERAL: 9.11
ECHO MV E/E' RATIO (AVERAGED): 12.76
ECHO MV E/E' SEPTAL: 16.4
ECHO MV REGURGITANT PEAK GRADIENT: 149 MMHG
ECHO MV REGURGITANT PEAK VELOCITY: 6.1 M/S
ECHO PV MAX VELOCITY: 1.4 M/S
ECHO PV PEAK GRADIENT: 8 MMHG
ECHO RIGHT VENTRICULAR SYSTOLIC PRESSURE (RVSP): 58 MMHG
ECHO RV FREE WALL PEAK S': 16 CM/S
ECHO RV INTERNAL DIMENSION: 3.4 CM
ECHO RV TAPSE: 2.3 CM (ref 1.5–2)
ECHO TV REGURGITANT MAX VELOCITY: 3.53 M/S
ECHO TV REGURGITANT PEAK GRADIENT: 50 MMHG
EOSINOPHIL # BLD: 0 K/UL (ref 0–0.4)
EOSINOPHIL # BLD: 0 K/UL (ref 0–0.4)
EOSINOPHIL # BLD: 1.4 K/UL (ref 0–0.4)
EOSINOPHIL NFR BLD: 0 % (ref 0–7)
EOSINOPHIL NFR BLD: 0 % (ref 0–7)
EOSINOPHIL NFR BLD: 16 % (ref 0–7)
EPITH CASTS URNS QL MICRO: ABNORMAL /LPF
ERYTHROCYTE [DISTWIDTH] IN BLOOD BY AUTOMATED COUNT: 12.8 % (ref 11.5–14.5)
ERYTHROCYTE [DISTWIDTH] IN BLOOD BY AUTOMATED COUNT: 12.9 % (ref 11.5–14.5)
ERYTHROCYTE [DISTWIDTH] IN BLOOD BY AUTOMATED COUNT: 12.9 % (ref 11.5–14.5)
EST. AVERAGE GLUCOSE BLD GHB EST-MCNC: 126 MG/DL
FLUAV H1 2009 PAND RNA SPEC QL NAA+PROBE: NOT DETECTED
FLUAV H1 RNA SPEC QL NAA+PROBE: NOT DETECTED
FLUAV H3 RNA SPEC QL NAA+PROBE: NOT DETECTED
FLUAV SUBTYP SPEC NAA+PROBE: NOT DETECTED
FLUBV RNA SPEC QL NAA+PROBE: NOT DETECTED
GLOBULIN SER CALC-MCNC: 4.1 G/DL (ref 2–4)
GLOBULIN SER CALC-MCNC: 4.2 G/DL (ref 2–4)
GLUCOSE BLD STRIP.AUTO-MCNC: 153 MG/DL (ref 65–117)
GLUCOSE BLD STRIP.AUTO-MCNC: 179 MG/DL (ref 65–117)
GLUCOSE BLD STRIP.AUTO-MCNC: 187 MG/DL (ref 65–117)
GLUCOSE BLD STRIP.AUTO-MCNC: 254 MG/DL (ref 65–117)
GLUCOSE SERPL-MCNC: 178 MG/DL (ref 65–100)
GLUCOSE SERPL-MCNC: 215 MG/DL (ref 65–100)
GLUCOSE SERPL-MCNC: 306 MG/DL (ref 65–100)
GLUCOSE UR STRIP.AUTO-MCNC: 500 MG/DL
GRAN CASTS URNS QL MICRO: ABNORMAL /LPF
HADV DNA SPEC QL NAA+PROBE: NOT DETECTED
HBA1C MFR BLD: 6 % (ref 4–5.6)
HCOV 229E RNA SPEC QL NAA+PROBE: NOT DETECTED
HCOV HKU1 RNA SPEC QL NAA+PROBE: NOT DETECTED
HCOV NL63 RNA SPEC QL NAA+PROBE: NOT DETECTED
HCOV OC43 RNA SPEC QL NAA+PROBE: NOT DETECTED
HCT VFR BLD AUTO: 27.2 % (ref 36.6–50.3)
HCT VFR BLD AUTO: 29.2 % (ref 36.6–50.3)
HCT VFR BLD AUTO: 31.5 % (ref 36.6–50.3)
HGB BLD-MCNC: 10.1 G/DL (ref 12.1–17)
HGB BLD-MCNC: 9 G/DL (ref 12.1–17)
HGB BLD-MCNC: 9.6 G/DL (ref 12.1–17)
HGB UR QL STRIP: NEGATIVE
HMPV RNA SPEC QL NAA+PROBE: NOT DETECTED
HPIV1 RNA SPEC QL NAA+PROBE: NOT DETECTED
HPIV2 RNA SPEC QL NAA+PROBE: NOT DETECTED
HPIV3 RNA SPEC QL NAA+PROBE: NOT DETECTED
HPIV4 RNA SPEC QL NAA+PROBE: NOT DETECTED
HYALINE CASTS URNS QL MICRO: ABNORMAL /LPF (ref 0–5)
IMM GRANULOCYTES # BLD AUTO: 0 K/UL (ref 0–0.04)
IMM GRANULOCYTES # BLD AUTO: 0.1 K/UL (ref 0–0.04)
IMM GRANULOCYTES # BLD AUTO: 0.1 K/UL (ref 0–0.04)
IMM GRANULOCYTES NFR BLD AUTO: 0 % (ref 0–0.5)
IMM GRANULOCYTES NFR BLD AUTO: 1 % (ref 0–0.5)
IMM GRANULOCYTES NFR BLD AUTO: 1 % (ref 0–0.5)
KETONES UR QL STRIP.AUTO: 40 MG/DL
LACTATE BLD-SCNC: 1.41 MMOL/L (ref 0.4–2)
LACTATE BLD-SCNC: 2.18 MMOL/L (ref 0.4–2)
LACTATE SERPL-SCNC: 1.4 MMOL/L (ref 0.4–2)
LEUKOCYTE ESTERASE UR QL STRIP.AUTO: NEGATIVE
LYMPHOCYTES # BLD: 0.9 K/UL (ref 0.8–3.5)
LYMPHOCYTES # BLD: 1.2 K/UL (ref 0.8–3.5)
LYMPHOCYTES # BLD: 2.3 K/UL (ref 0.8–3.5)
LYMPHOCYTES NFR BLD: 27 % (ref 12–49)
LYMPHOCYTES NFR BLD: 6 % (ref 12–49)
LYMPHOCYTES NFR BLD: 7 % (ref 12–49)
M PNEUMO DNA SPEC QL NAA+PROBE: NOT DETECTED
MAGNESIUM SERPL-MCNC: 1.8 MG/DL (ref 1.6–2.4)
MAGNESIUM SERPL-MCNC: 1.8 MG/DL (ref 1.6–2.4)
MCH RBC QN AUTO: 32.5 PG (ref 26–34)
MCH RBC QN AUTO: 32.7 PG (ref 26–34)
MCH RBC QN AUTO: 33 PG (ref 26–34)
MCHC RBC AUTO-ENTMCNC: 32.1 G/DL (ref 30–36.5)
MCHC RBC AUTO-ENTMCNC: 32.9 G/DL (ref 30–36.5)
MCHC RBC AUTO-ENTMCNC: 33.1 G/DL (ref 30–36.5)
MCV RBC AUTO: 102.9 FL (ref 80–99)
MCV RBC AUTO: 98.9 FL (ref 80–99)
MCV RBC AUTO: 99 FL (ref 80–99)
MONOCYTES # BLD: 0.5 K/UL (ref 0–1)
MONOCYTES # BLD: 0.7 K/UL (ref 0–1)
MONOCYTES # BLD: 0.8 K/UL (ref 0–1)
MONOCYTES NFR BLD: 5 % (ref 5–13)
MONOCYTES NFR BLD: 5 % (ref 5–13)
MONOCYTES NFR BLD: 6 % (ref 5–13)
NEUTS SEG # BLD: 11.9 K/UL (ref 1.8–8)
NEUTS SEG # BLD: 14.2 K/UL (ref 1.8–8)
NEUTS SEG # BLD: 4.2 K/UL (ref 1.8–8)
NEUTS SEG NFR BLD: 50 % (ref 32–75)
NEUTS SEG NFR BLD: 87 % (ref 32–75)
NEUTS SEG NFR BLD: 87 % (ref 32–75)
NITRITE UR QL STRIP.AUTO: NEGATIVE
NRBC # BLD: 0 K/UL (ref 0–0.01)
NRBC BLD-RTO: 0 PER 100 WBC
P-R INTERVAL, ECG05: 110 MS
P-R INTERVAL, ECG05: 168 MS
P-R INTERVAL, ECG05: 176 MS
P-R INTERVAL, ECG05: 180 MS
PH UR STRIP: 5.5 [PH] (ref 5–8)
PHOSPHATE SERPL-MCNC: 5.1 MG/DL (ref 2.6–4.7)
PLATELET # BLD AUTO: 291 K/UL (ref 150–400)
PLATELET # BLD AUTO: 525 K/UL (ref 150–400)
PLATELET # BLD AUTO: 550 K/UL (ref 150–400)
PMV BLD AUTO: 9.4 FL (ref 8.9–12.9)
PMV BLD AUTO: 9.7 FL (ref 8.9–12.9)
PMV BLD AUTO: 9.8 FL (ref 8.9–12.9)
POTASSIUM SERPL-SCNC: 4.2 MMOL/L (ref 3.5–5.1)
POTASSIUM SERPL-SCNC: 4.6 MMOL/L (ref 3.5–5.1)
POTASSIUM SERPL-SCNC: 5.1 MMOL/L (ref 3.5–5.1)
PROCALCITONIN SERPL-MCNC: 0.36 NG/ML
PROT SERPL-MCNC: 7 G/DL (ref 6.4–8.2)
PROT SERPL-MCNC: 7.7 G/DL (ref 6.4–8.2)
PROT UR STRIP-MCNC: 100 MG/DL
Q-T INTERVAL, ECG07: 324 MS
Q-T INTERVAL, ECG07: 332 MS
Q-T INTERVAL, ECG07: 348 MS
Q-T INTERVAL, ECG07: 352 MS
QRS DURATION, ECG06: 74 MS
QRS DURATION, ECG06: 74 MS
QRS DURATION, ECG06: 76 MS
QRS DURATION, ECG06: 76 MS
QTC CALCULATION (BEZET), ECG08: 389 MS
QTC CALCULATION (BEZET), ECG08: 404 MS
QTC CALCULATION (BEZET), ECG08: 405 MS
QTC CALCULATION (BEZET), ECG08: 444 MS
RBC # BLD AUTO: 2.75 M/UL (ref 4.1–5.7)
RBC # BLD AUTO: 2.95 M/UL (ref 4.1–5.7)
RBC # BLD AUTO: 3.06 M/UL (ref 4.1–5.7)
RBC #/AREA URNS HPF: ABNORMAL /HPF (ref 0–5)
RBC MORPH BLD: ABNORMAL
RSV RNA SPEC QL NAA+PROBE: NOT DETECTED
RV+EV RNA SPEC QL NAA+PROBE: NOT DETECTED
SAMPLES BEING HELD,HOLD: NORMAL
SARS-COV-2 PCR, COVPCR: NOT DETECTED
SERVICE CMNT-IMP: ABNORMAL
SERVICE CMNT-IMP: NORMAL
SODIUM SERPL-SCNC: 127 MMOL/L (ref 136–145)
SODIUM SERPL-SCNC: 131 MMOL/L (ref 136–145)
SODIUM SERPL-SCNC: 137 MMOL/L (ref 136–145)
SOURCE, COVRS: NORMAL
SP GR UR REFRACTOMETRY: 1.03 (ref 1–1.03)
TROPONIN-HIGH SENSITIVITY: 1041 NG/L (ref 0–76)
TROPONIN-HIGH SENSITIVITY: 538 NG/L (ref 0–76)
TROPONIN-HIGH SENSITIVITY: 560 NG/L (ref 0–76)
TROPONIN-HIGH SENSITIVITY: 948 NG/L (ref 0–76)
UROBILINOGEN UR QL STRIP.AUTO: 0.2 EU/DL (ref 0.2–1)
VENTRICULAR RATE, ECG03: 108 BPM
VENTRICULAR RATE, ECG03: 80 BPM
VENTRICULAR RATE, ECG03: 81 BPM
VENTRICULAR RATE, ECG03: 87 BPM
WBC # BLD AUTO: 13.7 K/UL (ref 4.1–11.1)
WBC # BLD AUTO: 16.4 K/UL (ref 4.1–11.1)
WBC # BLD AUTO: 8.5 K/UL (ref 4.1–11.1)
WBC URNS QL MICRO: ABNORMAL /HPF (ref 0–4)

## 2022-01-01 PROCEDURE — G0299 HHS/HOSPICE OF RN EA 15 MIN: HCPCS

## 2022-01-01 PROCEDURE — 74011000250 HC RX REV CODE- 250: Performed by: FAMILY MEDICINE

## 2022-01-01 PROCEDURE — 0651 HSPC ROUTINE HOME CARE

## 2022-01-01 PROCEDURE — 99204 OFFICE O/P NEW MOD 45 MIN: CPT | Performed by: INTERNAL MEDICINE

## 2022-01-01 PROCEDURE — 0655 HSPC INPATIENT RESPITE

## 2022-01-01 PROCEDURE — 36415 COLL VENOUS BLD VENIPUNCTURE: CPT

## 2022-01-01 PROCEDURE — 74011250636 HC RX REV CODE- 250/636: Performed by: FAMILY MEDICINE

## 2022-01-01 PROCEDURE — APPSS30 APP SPLIT SHARED TIME 16-30 MINUTES: Performed by: NURSE PRACTITIONER

## 2022-01-01 PROCEDURE — 85025 COMPLETE CBC W/AUTO DIFF WBC: CPT

## 2022-01-01 PROCEDURE — G0156 HHCP-SVS OF AIDE,EA 15 MIN: HCPCS

## 2022-01-01 PROCEDURE — 74011000250 HC RX REV CODE- 250: Performed by: NURSE PRACTITIONER

## 2022-01-01 PROCEDURE — 83605 ASSAY OF LACTIC ACID: CPT

## 2022-01-01 PROCEDURE — HOSPICE MEDICATION HC HH HOSPICE MEDICATION

## 2022-01-01 PROCEDURE — G8419 CALC BMI OUT NRM PARAM NOF/U: HCPCS | Performed by: INTERNAL MEDICINE

## 2022-01-01 PROCEDURE — 83735 ASSAY OF MAGNESIUM: CPT

## 2022-01-01 PROCEDURE — 84484 ASSAY OF TROPONIN QUANT: CPT

## 2022-01-01 PROCEDURE — 0202U NFCT DS 22 TRGT SARS-COV-2: CPT

## 2022-01-01 PROCEDURE — 74011250637 HC RX REV CODE- 250/637: Performed by: FAMILY MEDICINE

## 2022-01-01 PROCEDURE — 3336500001 HSPC ELECTION

## 2022-01-01 PROCEDURE — 83036 HEMOGLOBIN GLYCOSYLATED A1C: CPT

## 2022-01-01 PROCEDURE — 99232 SBSQ HOSP IP/OBS MODERATE 35: CPT | Performed by: INTERNAL MEDICINE

## 2022-01-01 PROCEDURE — 0656 HSPC GENERAL INPATIENT

## 2022-01-01 PROCEDURE — 83880 ASSAY OF NATRIURETIC PEPTIDE: CPT

## 2022-01-01 PROCEDURE — 93005 ELECTROCARDIOGRAM TRACING: CPT

## 2022-01-01 PROCEDURE — 99232 SBSQ HOSP IP/OBS MODERATE 35: CPT | Performed by: FAMILY MEDICINE

## 2022-01-01 PROCEDURE — 99223 1ST HOSP IP/OBS HIGH 75: CPT | Performed by: NURSE PRACTITIONER

## 2022-01-01 PROCEDURE — 82962 GLUCOSE BLOOD TEST: CPT

## 2022-01-01 PROCEDURE — 74011250637 HC RX REV CODE- 250/637: Performed by: NURSE PRACTITIONER

## 2022-01-01 PROCEDURE — 99284 EMERGENCY DEPT VISIT MOD MDM: CPT

## 2022-01-01 PROCEDURE — 80048 BASIC METABOLIC PNL TOTAL CA: CPT

## 2022-01-01 PROCEDURE — 87040 BLOOD CULTURE FOR BACTERIA: CPT

## 2022-01-01 PROCEDURE — G0155 HHCP-SVS OF CSW,EA 15 MIN: HCPCS

## 2022-01-01 PROCEDURE — 74011250636 HC RX REV CODE- 250/636: Performed by: NURSE PRACTITIONER

## 2022-01-01 PROCEDURE — 74011000258 HC RX REV CODE- 258: Performed by: INTERNAL MEDICINE

## 2022-01-01 PROCEDURE — 84100 ASSAY OF PHOSPHORUS: CPT

## 2022-01-01 PROCEDURE — 1101F PT FALLS ASSESS-DOCD LE1/YR: CPT | Performed by: INTERNAL MEDICINE

## 2022-01-01 PROCEDURE — 93306 TTE W/DOPPLER COMPLETE: CPT

## 2022-01-01 PROCEDURE — 74011000258 HC RX REV CODE- 258: Performed by: STUDENT IN AN ORGANIZED HEALTH CARE EDUCATION/TRAINING PROGRAM

## 2022-01-01 PROCEDURE — G8428 CUR MEDS NOT DOCUMENT: HCPCS | Performed by: INTERNAL MEDICINE

## 2022-01-01 PROCEDURE — 84145 PROCALCITONIN (PCT): CPT

## 2022-01-01 PROCEDURE — 87635 SARS-COV-2 COVID-19 AMP PRB: CPT

## 2022-01-01 PROCEDURE — 99214 OFFICE O/P EST MOD 30 MIN: CPT | Performed by: PSYCHIATRY & NEUROLOGY

## 2022-01-01 PROCEDURE — 96375 TX/PRO/DX INJ NEW DRUG ADDON: CPT

## 2022-01-01 PROCEDURE — 74011636637 HC RX REV CODE- 636/637: Performed by: INTERNAL MEDICINE

## 2022-01-01 PROCEDURE — 74011250636 HC RX REV CODE- 250/636: Performed by: INTERNAL MEDICINE

## 2022-01-01 PROCEDURE — 74011636637 HC RX REV CODE- 636/637: Performed by: FAMILY MEDICINE

## 2022-01-01 PROCEDURE — 74011000250 HC RX REV CODE- 250: Performed by: INTERNAL MEDICINE

## 2022-01-01 PROCEDURE — 96374 THER/PROPH/DIAG INJ IV PUSH: CPT

## 2022-01-01 PROCEDURE — 94760 N-INVAS EAR/PLS OXIMETRY 1: CPT

## 2022-01-01 PROCEDURE — 80053 COMPREHEN METABOLIC PANEL: CPT

## 2022-01-01 PROCEDURE — 99215 OFFICE O/P EST HI 40 MIN: CPT | Performed by: INTERNAL MEDICINE

## 2022-01-01 PROCEDURE — 74011250636 HC RX REV CODE- 250/636: Performed by: STUDENT IN AN ORGANIZED HEALTH CARE EDUCATION/TRAINING PROGRAM

## 2022-01-01 PROCEDURE — 87086 URINE CULTURE/COLONY COUNT: CPT

## 2022-01-01 PROCEDURE — 74011250637 HC RX REV CODE- 250/637: Performed by: INTERNAL MEDICINE

## 2022-01-01 PROCEDURE — 99285 EMERGENCY DEPT VISIT HI MDM: CPT

## 2022-01-01 PROCEDURE — 77010033678 HC OXYGEN DAILY

## 2022-01-01 PROCEDURE — 93306 TTE W/DOPPLER COMPLETE: CPT | Performed by: SPECIALIST

## 2022-01-01 PROCEDURE — 65660000000 HC RM CCU STEPDOWN

## 2022-01-01 PROCEDURE — G8432 DEP SCR NOT DOC, RNG: HCPCS | Performed by: INTERNAL MEDICINE

## 2022-01-01 PROCEDURE — 71045 X-RAY EXAM CHEST 1 VIEW: CPT

## 2022-01-01 PROCEDURE — G8536 NO DOC ELDER MAL SCRN: HCPCS | Performed by: INTERNAL MEDICINE

## 2022-01-01 PROCEDURE — 99233 SBSQ HOSP IP/OBS HIGH 50: CPT | Performed by: FAMILY MEDICINE

## 2022-01-01 PROCEDURE — 74011000250 HC RX REV CODE- 250: Performed by: STUDENT IN AN ORGANIZED HEALTH CARE EDUCATION/TRAINING PROGRAM

## 2022-01-01 PROCEDURE — 81001 URINALYSIS AUTO W/SCOPE: CPT

## 2022-01-01 PROCEDURE — 99223 1ST HOSP IP/OBS HIGH 75: CPT | Performed by: INTERNAL MEDICINE

## 2022-01-01 RX ORDER — BENAZEPRIL HYDROCHLORIDE 40 MG/1
40 TABLET ORAL DAILY
Status: DISCONTINUED | OUTPATIENT
Start: 2022-01-01 | End: 2022-01-01

## 2022-01-01 RX ORDER — FOLIC ACID 1 MG/1
1 TABLET ORAL DAILY
Status: DISCONTINUED | OUTPATIENT
Start: 2022-01-01 | End: 2022-01-01

## 2022-01-01 RX ORDER — LEVOFLOXACIN 5 MG/ML
750 INJECTION, SOLUTION INTRAVENOUS
Status: DISCONTINUED | OUTPATIENT
Start: 2022-01-01 | End: 2022-01-01

## 2022-01-01 RX ORDER — INSULIN LISPRO 100 [IU]/ML
INJECTION, SOLUTION INTRAVENOUS; SUBCUTANEOUS
Status: DISCONTINUED | OUTPATIENT
Start: 2022-01-01 | End: 2022-01-01 | Stop reason: HOSPADM

## 2022-01-01 RX ORDER — CEFDINIR 300 MG/1
300 CAPSULE ORAL 2 TIMES DAILY
Qty: 14 CAPSULE | Refills: 0 | Status: SHIPPED | OUTPATIENT
Start: 2022-01-01 | End: 2022-01-01

## 2022-01-01 RX ORDER — CARVEDILOL 12.5 MG/1
12.5 TABLET ORAL 2 TIMES DAILY WITH MEALS
Status: DISCONTINUED | OUTPATIENT
Start: 2022-01-01 | End: 2022-01-01 | Stop reason: HOSPADM

## 2022-01-01 RX ORDER — LORAZEPAM 2 MG/ML
1 INJECTION INTRAMUSCULAR
Status: DISCONTINUED | OUTPATIENT
Start: 2022-01-01 | End: 2022-03-09 | Stop reason: HOSPADM

## 2022-01-01 RX ORDER — INSULIN GLARGINE 100 [IU]/ML
0.2 INJECTION, SOLUTION SUBCUTANEOUS DAILY
Status: DISCONTINUED | OUTPATIENT
Start: 2022-01-01 | End: 2022-01-01 | Stop reason: HOSPADM

## 2022-01-01 RX ORDER — MORPHINE SULFATE 20 MG/ML
5 SOLUTION ORAL
Status: DISCONTINUED | OUTPATIENT
Start: 2022-01-01 | End: 2022-03-09 | Stop reason: HOSPADM

## 2022-01-01 RX ORDER — PREDNISONE 1 MG/1
1 TABLET ORAL
Status: DISCONTINUED | OUTPATIENT
Start: 2022-01-01 | End: 2022-01-01

## 2022-01-01 RX ORDER — DEXTROSE 50 % IN WATER (D50W) INTRAVENOUS SYRINGE
25-50 AS NEEDED
Status: DISCONTINUED | OUTPATIENT
Start: 2022-01-01 | End: 2022-01-01 | Stop reason: HOSPADM

## 2022-01-01 RX ORDER — SODIUM CHLORIDE 0.9 % (FLUSH) 0.9 %
5-40 SYRINGE (ML) INJECTION EVERY 8 HOURS
Status: DISCONTINUED | OUTPATIENT
Start: 2022-01-01 | End: 2022-01-01 | Stop reason: HOSPADM

## 2022-01-01 RX ORDER — ACETAMINOPHEN 650 MG/1
650 SUPPOSITORY RECTAL
Status: DISCONTINUED | OUTPATIENT
Start: 2022-01-01 | End: 2022-03-09 | Stop reason: HOSPADM

## 2022-01-01 RX ORDER — EZETIMIBE 10 MG/1
10 TABLET ORAL DAILY
Status: DISCONTINUED | OUTPATIENT
Start: 2022-01-01 | End: 2022-01-01 | Stop reason: HOSPADM

## 2022-01-01 RX ORDER — FUROSEMIDE 10 MG/ML
20 INJECTION INTRAMUSCULAR; INTRAVENOUS DAILY
Status: DISCONTINUED | OUTPATIENT
Start: 2022-01-01 | End: 2022-01-01

## 2022-01-01 RX ORDER — LORAZEPAM 2 MG/ML
0.5 CONCENTRATE ORAL
Status: DISCONTINUED | OUTPATIENT
Start: 2022-01-01 | End: 2022-01-01

## 2022-01-01 RX ORDER — TIMOLOL MALEATE 5 MG/ML
1 SOLUTION/ DROPS OPHTHALMIC 2 TIMES DAILY
Status: DISCONTINUED | OUTPATIENT
Start: 2022-01-01 | End: 2022-01-01

## 2022-01-01 RX ORDER — PROCHLORPERAZINE MALEATE 5 MG
10 TABLET ORAL
Status: DISCONTINUED | OUTPATIENT
Start: 2022-01-01 | End: 2022-03-09 | Stop reason: HOSPADM

## 2022-01-01 RX ORDER — KETOROLAC TROMETHAMINE 30 MG/ML
30 INJECTION, SOLUTION INTRAMUSCULAR; INTRAVENOUS
Status: ACTIVE | OUTPATIENT
Start: 2022-01-01 | End: 2022-01-01

## 2022-01-01 RX ORDER — CLOPIDOGREL BISULFATE 75 MG/1
75 TABLET ORAL DAILY
Status: DISCONTINUED | OUTPATIENT
Start: 2022-01-01 | End: 2022-01-01 | Stop reason: HOSPADM

## 2022-01-01 RX ORDER — FUROSEMIDE 10 MG/ML
40 INJECTION INTRAMUSCULAR; INTRAVENOUS ONCE
Status: COMPLETED | OUTPATIENT
Start: 2022-01-01 | End: 2022-01-01

## 2022-01-01 RX ORDER — ONDANSETRON 4 MG/1
4 TABLET, ORALLY DISINTEGRATING ORAL
Status: DISCONTINUED | OUTPATIENT
Start: 2022-01-01 | End: 2022-03-09 | Stop reason: HOSPADM

## 2022-01-01 RX ORDER — TIMOLOL MALEATE 2.5 MG/ML
1 SOLUTION/ DROPS OPHTHALMIC 2 TIMES DAILY
Status: DISCONTINUED | OUTPATIENT
Start: 2022-01-01 | End: 2022-01-01 | Stop reason: HOSPADM

## 2022-01-01 RX ORDER — SULFASALAZINE 500 MG/1
1000 TABLET ORAL 2 TIMES DAILY
Status: DISCONTINUED | OUTPATIENT
Start: 2022-01-01 | End: 2022-01-01 | Stop reason: HOSPADM

## 2022-01-01 RX ORDER — AMLODIPINE BESYLATE 5 MG/1
5 TABLET ORAL DAILY
Status: DISCONTINUED | OUTPATIENT
Start: 2022-01-01 | End: 2022-01-01 | Stop reason: HOSPADM

## 2022-01-01 RX ORDER — PRAVASTATIN SODIUM 20 MG/1
10 TABLET ORAL
Status: DISCONTINUED | OUTPATIENT
Start: 2022-01-01 | End: 2022-01-01 | Stop reason: HOSPADM

## 2022-01-01 RX ORDER — GLYCOPYRROLATE 0.2 MG/ML
0.2 INJECTION INTRAMUSCULAR; INTRAVENOUS
Status: DISCONTINUED | OUTPATIENT
Start: 2022-01-01 | End: 2022-01-01

## 2022-01-01 RX ORDER — HEPARIN SODIUM 5000 [USP'U]/ML
5000 INJECTION, SOLUTION INTRAVENOUS; SUBCUTANEOUS EVERY 8 HOURS
Status: DISCONTINUED | OUTPATIENT
Start: 2022-01-01 | End: 2022-01-01 | Stop reason: HOSPADM

## 2022-01-01 RX ORDER — LATANOPROST 50 UG/ML
1 SOLUTION/ DROPS OPHTHALMIC
Status: DISCONTINUED | OUTPATIENT
Start: 2022-01-01 | End: 2022-01-01

## 2022-01-01 RX ORDER — GUAIFENESIN 100 MG/5ML
81 LIQUID (ML) ORAL DAILY
Status: DISCONTINUED | OUTPATIENT
Start: 2022-01-01 | End: 2022-01-01

## 2022-01-01 RX ORDER — HALOPERIDOL 2 MG/ML
1 SOLUTION ORAL
Status: DISCONTINUED | OUTPATIENT
Start: 2022-01-01 | End: 2022-03-09 | Stop reason: HOSPADM

## 2022-01-01 RX ORDER — SODIUM CHLORIDE 0.9 % (FLUSH) 0.9 %
5-40 SYRINGE (ML) INJECTION AS NEEDED
Status: DISCONTINUED | OUTPATIENT
Start: 2022-01-01 | End: 2022-01-01 | Stop reason: HOSPADM

## 2022-01-01 RX ORDER — BUMETANIDE 1 MG/1
1 TABLET ORAL DAILY
Status: DISCONTINUED | OUTPATIENT
Start: 2022-01-01 | End: 2022-01-01

## 2022-01-01 RX ORDER — MORPHINE SULFATE 2 MG/ML
2 INJECTION, SOLUTION INTRAMUSCULAR; INTRAVENOUS
Status: DISCONTINUED | OUTPATIENT
Start: 2022-01-01 | End: 2022-03-09 | Stop reason: HOSPADM

## 2022-01-01 RX ORDER — SODIUM BICARBONATE 650 MG/1
TABLET ORAL 4 TIMES DAILY
COMMUNITY

## 2022-01-01 RX ORDER — CARVEDILOL 3.12 MG/1
6.25 TABLET ORAL 2 TIMES DAILY WITH MEALS
Status: DISCONTINUED | OUTPATIENT
Start: 2022-01-01 | End: 2022-01-01

## 2022-01-01 RX ORDER — ACETAMINOPHEN 650 MG/1
650 SUPPOSITORY RECTAL
Status: DISCONTINUED | OUTPATIENT
Start: 2022-01-01 | End: 2022-01-01 | Stop reason: HOSPADM

## 2022-01-01 RX ORDER — AMLODIPINE BESYLATE 5 MG/1
5 TABLET ORAL DAILY
Qty: 90 TABLET | Refills: 1 | Status: SHIPPED | OUTPATIENT
Start: 2022-01-01

## 2022-01-01 RX ORDER — PRAVASTATIN SODIUM 10 MG/1
10 TABLET ORAL
Qty: 30 TABLET | Refills: 3 | Status: SHIPPED | OUTPATIENT
Start: 2022-01-01 | End: 2022-01-01 | Stop reason: SDUPTHER

## 2022-01-01 RX ORDER — METOPROLOL TARTRATE 25 MG/1
25 TABLET, FILM COATED ORAL EVERY 12 HOURS
Status: DISCONTINUED | OUTPATIENT
Start: 2022-01-01 | End: 2022-01-01

## 2022-01-01 RX ORDER — ONDANSETRON 2 MG/ML
4 INJECTION INTRAMUSCULAR; INTRAVENOUS
Status: DISCONTINUED | OUTPATIENT
Start: 2022-01-01 | End: 2022-01-01 | Stop reason: HOSPADM

## 2022-01-01 RX ORDER — LORAZEPAM 2 MG/ML
1 CONCENTRATE ORAL
Status: DISCONTINUED | OUTPATIENT
Start: 2022-01-01 | End: 2022-03-09 | Stop reason: HOSPADM

## 2022-01-01 RX ORDER — DIPHENHYDRAMINE HYDROCHLORIDE 50 MG/ML
50 INJECTION, SOLUTION INTRAMUSCULAR; INTRAVENOUS
Status: CANCELLED | OUTPATIENT
Start: 2022-01-01 | End: 2022-01-01

## 2022-01-01 RX ORDER — DOXYCYCLINE 100 MG/1
100 TABLET ORAL 2 TIMES DAILY
Qty: 14 TABLET | Refills: 0 | Status: SHIPPED | OUTPATIENT
Start: 2022-01-01 | End: 2022-01-01

## 2022-01-01 RX ORDER — ENOXAPARIN SODIUM 100 MG/ML
40 INJECTION SUBCUTANEOUS DAILY
Status: DISCONTINUED | OUTPATIENT
Start: 2022-01-01 | End: 2022-01-01

## 2022-01-01 RX ORDER — BUMETANIDE 1 MG/1
1 TABLET ORAL 2 TIMES DAILY
Qty: 60 TABLET | Refills: 0 | Status: SHIPPED | OUTPATIENT
Start: 2022-01-01

## 2022-01-01 RX ORDER — FUROSEMIDE 10 MG/ML
10 INJECTION INTRAMUSCULAR; INTRAVENOUS
Status: COMPLETED | OUTPATIENT
Start: 2022-01-01 | End: 2022-01-01

## 2022-01-01 RX ORDER — TIMOLOL MALEATE 2.5 MG/ML
1 SOLUTION/ DROPS OPHTHALMIC 2 TIMES DAILY
Status: DISCONTINUED | OUTPATIENT
Start: 2022-01-01 | End: 2022-01-01

## 2022-01-01 RX ORDER — HYOSCYAMINE SULFATE 0.12 MG/1
0.12 TABLET SUBLINGUAL
Status: DISCONTINUED | OUTPATIENT
Start: 2022-01-01 | End: 2022-03-09 | Stop reason: HOSPADM

## 2022-01-01 RX ORDER — ACETAMINOPHEN 325 MG/1
650 TABLET ORAL
Status: DISCONTINUED | OUTPATIENT
Start: 2022-01-01 | End: 2022-01-01 | Stop reason: HOSPADM

## 2022-01-01 RX ORDER — CARVEDILOL PHOSPHATE 10 MG/1
10 CAPSULE, EXTENDED RELEASE ORAL DAILY
Status: DISCONTINUED | OUTPATIENT
Start: 2022-01-01 | End: 2022-01-01

## 2022-01-01 RX ORDER — MORPHINE SULFATE 4 MG/ML
4 INJECTION INTRAVENOUS EVERY 4 HOURS
Status: DISCONTINUED | OUTPATIENT
Start: 2022-01-01 | End: 2022-03-09 | Stop reason: HOSPADM

## 2022-01-01 RX ORDER — BIMATOPROST 0.3 MG/ML
1 SOLUTION/ DROPS OPHTHALMIC
Status: DISCONTINUED | OUTPATIENT
Start: 2022-01-01 | End: 2022-01-01

## 2022-01-01 RX ORDER — POLYETHYLENE GLYCOL 3350 17 G/17G
17 POWDER, FOR SOLUTION ORAL DAILY PRN
Status: DISCONTINUED | OUTPATIENT
Start: 2022-01-01 | End: 2022-01-01 | Stop reason: HOSPADM

## 2022-01-01 RX ORDER — SULFASALAZINE 500 MG/1
1000 TABLET ORAL 2 TIMES DAILY WITH MEALS
Status: DISCONTINUED | OUTPATIENT
Start: 2022-01-01 | End: 2022-01-01

## 2022-01-01 RX ORDER — LORAZEPAM 2 MG/ML
1 INJECTION INTRAMUSCULAR EVERY 4 HOURS
Status: DISCONTINUED | OUTPATIENT
Start: 2022-01-01 | End: 2022-01-01

## 2022-01-01 RX ORDER — BIMATOPROST 0.3 MG/ML
SOLUTION/ DROPS OPHTHALMIC
COMMUNITY

## 2022-01-01 RX ORDER — AMLODIPINE BESYLATE 5 MG/1
5 TABLET ORAL DAILY
Status: DISCONTINUED | OUTPATIENT
Start: 2022-01-01 | End: 2022-01-01

## 2022-01-01 RX ORDER — LATANOPROST 50 UG/ML
1 SOLUTION/ DROPS OPHTHALMIC
Status: DISCONTINUED | OUTPATIENT
Start: 2022-01-01 | End: 2022-01-01 | Stop reason: HOSPADM

## 2022-01-01 RX ORDER — LORATADINE 10 MG/1
10 TABLET ORAL DAILY
Status: DISCONTINUED | OUTPATIENT
Start: 2022-01-01 | End: 2022-01-01

## 2022-01-01 RX ORDER — GLYCOPYRROLATE 0.2 MG/ML
0.2 INJECTION INTRAMUSCULAR; INTRAVENOUS EVERY 4 HOURS
Status: DISCONTINUED | OUTPATIENT
Start: 2022-01-01 | End: 2022-03-09 | Stop reason: HOSPADM

## 2022-01-01 RX ORDER — LORAZEPAM 2 MG/ML
2 INJECTION INTRAMUSCULAR EVERY 4 HOURS
Status: DISCONTINUED | OUTPATIENT
Start: 2022-01-01 | End: 2022-03-09 | Stop reason: HOSPADM

## 2022-01-01 RX ORDER — GUAIFENESIN 100 MG/5ML
81 LIQUID (ML) ORAL DAILY
Status: DISCONTINUED | OUTPATIENT
Start: 2022-01-01 | End: 2022-01-01 | Stop reason: HOSPADM

## 2022-01-01 RX ORDER — MORPHINE SULFATE 2 MG/ML
2 INJECTION, SOLUTION INTRAMUSCULAR; INTRAVENOUS EVERY 4 HOURS
Status: DISCONTINUED | OUTPATIENT
Start: 2022-01-01 | End: 2022-01-01

## 2022-01-01 RX ORDER — PRAVASTATIN SODIUM 10 MG/1
10 TABLET ORAL
Qty: 90 TABLET | Refills: 1 | Status: SHIPPED | OUTPATIENT
Start: 2022-01-01

## 2022-01-01 RX ORDER — SODIUM CHLORIDE 0.9 % (FLUSH) 0.9 %
5-40 SYRINGE (ML) INJECTION EVERY 8 HOURS
Status: CANCELLED | OUTPATIENT
Start: 2022-01-01

## 2022-01-01 RX ORDER — BUMETANIDE 0.25 MG/ML
1 INJECTION INTRAMUSCULAR; INTRAVENOUS EVERY 12 HOURS
Status: DISCONTINUED | OUTPATIENT
Start: 2022-01-01 | End: 2022-01-01 | Stop reason: HOSPADM

## 2022-01-01 RX ORDER — DORZOLAMIDE HCL 20 MG/ML
SOLUTION/ DROPS OPHTHALMIC
COMMUNITY
End: 2022-01-01

## 2022-01-01 RX ORDER — SODIUM BICARBONATE 650 MG/1
650 TABLET ORAL 4 TIMES DAILY
Status: DISCONTINUED | OUTPATIENT
Start: 2022-01-01 | End: 2022-01-01 | Stop reason: HOSPADM

## 2022-01-01 RX ORDER — SODIUM CHLORIDE 9 MG/ML
75 INJECTION, SOLUTION INTRAVENOUS CONTINUOUS
Status: CANCELLED | OUTPATIENT
Start: 2022-01-01 | End: 2022-01-01

## 2022-01-01 RX ORDER — MAGNESIUM SULFATE 100 %
4 CRYSTALS MISCELLANEOUS AS NEEDED
Status: DISCONTINUED | OUTPATIENT
Start: 2022-01-01 | End: 2022-01-01 | Stop reason: HOSPADM

## 2022-01-01 RX ORDER — SODIUM CHLORIDE 0.9 % (FLUSH) 0.9 %
5-10 SYRINGE (ML) INJECTION AS NEEDED
Status: DISCONTINUED | OUTPATIENT
Start: 2022-01-01 | End: 2022-01-01 | Stop reason: HOSPADM

## 2022-01-01 RX ORDER — FACIAL-BODY WIPES
10 EACH TOPICAL DAILY PRN
Status: DISCONTINUED | OUTPATIENT
Start: 2022-01-01 | End: 2022-03-09 | Stop reason: HOSPADM

## 2022-01-01 RX ORDER — LEVOFLOXACIN 5 MG/ML
750 INJECTION, SOLUTION INTRAVENOUS EVERY 24 HOURS
Status: DISCONTINUED | OUTPATIENT
Start: 2022-01-01 | End: 2022-01-01 | Stop reason: DRUGHIGH

## 2022-01-01 RX ORDER — METOPROLOL TARTRATE 25 MG/1
12.5 TABLET, FILM COATED ORAL EVERY 12 HOURS
Status: DISCONTINUED | OUTPATIENT
Start: 2022-01-01 | End: 2022-01-01

## 2022-01-01 RX ORDER — VANCOMYCIN 2 GRAM/500 ML IN 0.9 % SODIUM CHLORIDE INTRAVENOUS
2000 ONCE
Status: COMPLETED | OUTPATIENT
Start: 2022-01-01 | End: 2022-01-01

## 2022-01-01 RX ORDER — ONDANSETRON 4 MG/1
4 TABLET, ORALLY DISINTEGRATING ORAL
Status: DISCONTINUED | OUTPATIENT
Start: 2022-01-01 | End: 2022-01-01 | Stop reason: HOSPADM

## 2022-01-01 RX ORDER — PREDNISONE 1 MG/1
1 TABLET ORAL
Status: DISCONTINUED | OUTPATIENT
Start: 2022-01-01 | End: 2022-01-01 | Stop reason: HOSPADM

## 2022-01-01 RX ADMIN — MORPHINE SULFATE 2 MG: 2 INJECTION, SOLUTION INTRAMUSCULAR; INTRAVENOUS at 14:14

## 2022-01-01 RX ADMIN — MORPHINE SULFATE 2 MG: 2 INJECTION, SOLUTION INTRAMUSCULAR; INTRAVENOUS at 10:18

## 2022-01-01 RX ADMIN — CLOPIDOGREL BISULFATE 75 MG: 75 TABLET ORAL at 11:35

## 2022-01-01 RX ADMIN — FUROSEMIDE 10 MG: 10 INJECTION, SOLUTION INTRAMUSCULAR; INTRAVENOUS at 12:59

## 2022-01-01 RX ADMIN — LORAZEPAM 1 MG: 2 INJECTION INTRAMUSCULAR; INTRAVENOUS at 14:58

## 2022-01-01 RX ADMIN — LORAZEPAM 1 MG: 2 INJECTION INTRAMUSCULAR; INTRAVENOUS at 06:13

## 2022-01-01 RX ADMIN — MORPHINE SULFATE 2 MG: 2 INJECTION, SOLUTION INTRAMUSCULAR; INTRAVENOUS at 06:00

## 2022-01-01 RX ADMIN — EZETIMIBE 10 MG: 10 TABLET ORAL at 12:18

## 2022-01-01 RX ADMIN — ASPIRIN 81 MG: 81 TABLET, CHEWABLE ORAL at 08:10

## 2022-01-01 RX ADMIN — ASPIRIN 81 MG: 81 TABLET, CHEWABLE ORAL at 11:35

## 2022-01-01 RX ADMIN — Medication 0.5 MG: at 11:22

## 2022-01-01 RX ADMIN — PREDNISONE 1 MG: 1 TABLET ORAL at 12:18

## 2022-01-01 RX ADMIN — Medication 10 MG: at 08:12

## 2022-01-01 RX ADMIN — LORAZEPAM 1 MG: 2 INJECTION INTRAMUSCULAR; INTRAVENOUS at 18:49

## 2022-01-01 RX ADMIN — SULFASALAZINE 1000 MG: 500 TABLET ORAL at 12:18

## 2022-01-01 RX ADMIN — MORPHINE SULFATE 5 MG: 10 SOLUTION ORAL at 10:03

## 2022-01-01 RX ADMIN — INSULIN LISPRO 5 UNITS: 100 INJECTION, SOLUTION INTRAVENOUS; SUBCUTANEOUS at 15:17

## 2022-01-01 RX ADMIN — MORPHINE SULFATE 5 MG: 20 SOLUTION ORAL at 10:26

## 2022-01-01 RX ADMIN — PIPERACILLIN AND TAZOBACTAM 3.38 G: 3; .375 INJECTION, POWDER, LYOPHILIZED, FOR SOLUTION INTRAVENOUS at 05:41

## 2022-01-01 RX ADMIN — MORPHINE SULFATE 2 MG: 2 INJECTION, SOLUTION INTRAMUSCULAR; INTRAVENOUS at 21:53

## 2022-01-01 RX ADMIN — LORAZEPAM 2 MG: 2 INJECTION INTRAMUSCULAR; INTRAVENOUS at 01:51

## 2022-01-01 RX ADMIN — LORAZEPAM 1 MG: 2 INJECTION INTRAMUSCULAR; INTRAVENOUS at 02:43

## 2022-01-01 RX ADMIN — MORPHINE SULFATE 2 MG: 2 INJECTION, SOLUTION INTRAMUSCULAR; INTRAVENOUS at 21:12

## 2022-01-01 RX ADMIN — MORPHINE SULFATE 4 MG: 4 INJECTION INTRAVENOUS at 14:49

## 2022-01-01 RX ADMIN — MORPHINE SULFATE 2 MG: 2 INJECTION, SOLUTION INTRAMUSCULAR; INTRAVENOUS at 17:51

## 2022-01-01 RX ADMIN — GLYCOPYRROLATE 0.2 MG: 0.2 INJECTION, SOLUTION INTRAMUSCULAR; INTRAVENOUS at 16:05

## 2022-01-01 RX ADMIN — Medication 0.5 MG: at 07:15

## 2022-01-01 RX ADMIN — METOPROLOL TARTRATE 12.5 MG: 25 TABLET, FILM COATED ORAL at 21:35

## 2022-01-01 RX ADMIN — AMLODIPINE BESYLATE 5 MG: 5 TABLET ORAL at 08:11

## 2022-01-01 RX ADMIN — ACETAMINOPHEN 650 MG: 650 SUPPOSITORY RECTAL at 08:47

## 2022-01-01 RX ADMIN — PIPERACILLIN AND TAZOBACTAM 3.38 G: 3; .375 INJECTION, POWDER, LYOPHILIZED, FOR SOLUTION INTRAVENOUS at 21:37

## 2022-01-01 RX ADMIN — LORAZEPAM 2 MG: 2 INJECTION INTRAMUSCULAR; INTRAVENOUS at 10:18

## 2022-01-01 RX ADMIN — SODIUM CHLORIDE, PRESERVATIVE FREE 10 ML: 5 INJECTION INTRAVENOUS at 15:04

## 2022-01-01 RX ADMIN — Medication 40 MG: at 08:11

## 2022-01-01 RX ADMIN — BUMETANIDE 1 MG: 0.25 INJECTION INTRAMUSCULAR; INTRAVENOUS at 09:14

## 2022-01-01 RX ADMIN — SULFASALAZINE 1000 MG: 500 TABLET ORAL at 08:12

## 2022-01-01 RX ADMIN — MORPHINE SULFATE 5 MG: 10 SOLUTION ORAL at 11:22

## 2022-01-01 RX ADMIN — METOPROLOL TARTRATE 12.5 MG: 25 TABLET, FILM COATED ORAL at 14:43

## 2022-01-01 RX ADMIN — GLYCOPYRROLATE 0.2 MG: 0.2 INJECTION, SOLUTION INTRAMUSCULAR; INTRAVENOUS at 20:07

## 2022-01-01 RX ADMIN — HEPARIN SODIUM 5000 UNITS: 5000 INJECTION INTRAVENOUS; SUBCUTANEOUS at 09:15

## 2022-01-01 RX ADMIN — LORAZEPAM 2 MG: 2 INJECTION INTRAMUSCULAR; INTRAVENOUS at 06:00

## 2022-01-01 RX ADMIN — LATANOPROST 1 DROP: 50 SOLUTION/ DROPS OPHTHALMIC at 00:19

## 2022-01-01 RX ADMIN — LORAZEPAM 1 MG: 2 INJECTION INTRAMUSCULAR; INTRAVENOUS at 23:54

## 2022-01-01 RX ADMIN — ACETAMINOPHEN 650 MG: 650 SUPPOSITORY RECTAL at 20:35

## 2022-01-01 RX ADMIN — AMLODIPINE BESYLATE 5 MG: 5 TABLET ORAL at 11:35

## 2022-01-01 RX ADMIN — LORAZEPAM 1 MG: 2 INJECTION INTRAMUSCULAR; INTRAVENOUS at 21:12

## 2022-01-01 RX ADMIN — Medication 1 CAPSULE: at 20:09

## 2022-01-01 RX ADMIN — LORAZEPAM 1 MG: 2 INJECTION INTRAMUSCULAR; INTRAVENOUS at 10:29

## 2022-01-01 RX ADMIN — MORPHINE SULFATE 2 MG: 2 INJECTION, SOLUTION INTRAMUSCULAR; INTRAVENOUS at 02:44

## 2022-01-01 RX ADMIN — LORAZEPAM 2 MG: 2 INJECTION INTRAMUSCULAR; INTRAVENOUS at 14:14

## 2022-01-01 RX ADMIN — MORPHINE SULFATE 5 MG: 10 SOLUTION ORAL at 07:15

## 2022-01-01 RX ADMIN — LORAZEPAM 1 MG: 2 INJECTION INTRAMUSCULAR; INTRAVENOUS at 10:46

## 2022-01-01 RX ADMIN — MORPHINE SULFATE 2 MG: 2 INJECTION, SOLUTION INTRAMUSCULAR; INTRAVENOUS at 01:51

## 2022-01-01 RX ADMIN — PIPERACILLIN AND TAZOBACTAM 3.38 G: 3; .375 INJECTION, POWDER, LYOPHILIZED, FOR SOLUTION INTRAVENOUS at 13:08

## 2022-01-01 RX ADMIN — Medication 0.5 MG: at 08:40

## 2022-01-01 RX ADMIN — FOLIC ACID 1 MG: 1 TABLET ORAL at 08:12

## 2022-01-01 RX ADMIN — DOXYCYCLINE 100 MG: 100 INJECTION, POWDER, LYOPHILIZED, FOR SOLUTION INTRAVENOUS at 11:36

## 2022-01-01 RX ADMIN — SODIUM CHLORIDE, PRESERVATIVE FREE 10 ML: 5 INJECTION INTRAVENOUS at 14:43

## 2022-01-01 RX ADMIN — MORPHINE SULFATE 5 MG: 10 SOLUTION ORAL at 12:05

## 2022-01-01 RX ADMIN — SULFASALAZINE 1000 MG: 500 TABLET ORAL at 17:15

## 2022-01-01 RX ADMIN — MORPHINE SULFATE 2 MG: 2 INJECTION, SOLUTION INTRAMUSCULAR; INTRAVENOUS at 11:49

## 2022-01-01 RX ADMIN — Medication 1 MG: at 09:34

## 2022-01-01 RX ADMIN — LORAZEPAM 2 MG: 2 INJECTION INTRAMUSCULAR; INTRAVENOUS at 21:52

## 2022-01-01 RX ADMIN — TIMOLOL MALEATE 1 DROP: 2.5 SOLUTION OPHTHALMIC at 12:19

## 2022-01-01 RX ADMIN — LORAZEPAM 1 MG: 2 INJECTION INTRAMUSCULAR; INTRAVENOUS at 11:49

## 2022-01-01 RX ADMIN — Medication 0.5 MG: at 12:05

## 2022-01-01 RX ADMIN — LORAZEPAM 2 MG: 2 INJECTION INTRAMUSCULAR; INTRAVENOUS at 17:51

## 2022-01-01 RX ADMIN — Medication 0.5 MG: at 09:50

## 2022-01-01 RX ADMIN — INSULIN LISPRO 2 UNITS: 100 INJECTION, SOLUTION INTRAVENOUS; SUBCUTANEOUS at 09:15

## 2022-01-01 RX ADMIN — INSULIN LISPRO 2 UNITS: 100 INJECTION, SOLUTION INTRAVENOUS; SUBCUTANEOUS at 11:48

## 2022-01-01 RX ADMIN — MORPHINE SULFATE 2 MG: 2 INJECTION, SOLUTION INTRAMUSCULAR; INTRAVENOUS at 10:46

## 2022-01-01 RX ADMIN — MORPHINE SULFATE 2 MG: 2 INJECTION, SOLUTION INTRAMUSCULAR; INTRAVENOUS at 10:29

## 2022-01-01 RX ADMIN — MORPHINE SULFATE 2 MG: 2 INJECTION, SOLUTION INTRAMUSCULAR; INTRAVENOUS at 23:55

## 2022-01-01 RX ADMIN — VANCOMYCIN HYDROCHLORIDE 2000 MG: 10 INJECTION, POWDER, LYOPHILIZED, FOR SOLUTION INTRAVENOUS at 15:18

## 2022-01-01 RX ADMIN — LORAZEPAM 2 MG: 2 INJECTION INTRAMUSCULAR; INTRAVENOUS at 14:49

## 2022-01-01 RX ADMIN — GLYCOPYRROLATE 0.2 MG: 0.2 INJECTION, SOLUTION INTRAMUSCULAR; INTRAVENOUS at 12:06

## 2022-01-01 RX ADMIN — Medication 1 CAPSULE: at 08:10

## 2022-01-01 RX ADMIN — MORPHINE SULFATE 5 MG: 10 SOLUTION ORAL at 20:09

## 2022-01-01 RX ADMIN — BUMETANIDE 1 MG: 1 TABLET ORAL at 08:11

## 2022-01-01 RX ADMIN — PREDNISONE 1 MG: 1 TABLET ORAL at 08:11

## 2022-01-01 RX ADMIN — INSULIN GLARGINE 16 UNITS: 100 INJECTION, SOLUTION SUBCUTANEOUS at 09:17

## 2022-01-01 RX ADMIN — GLYCOPYRROLATE 0.2 MG: 0.2 INJECTION, SOLUTION INTRAMUSCULAR; INTRAVENOUS at 06:24

## 2022-01-01 RX ADMIN — GLYCOPYRROLATE 0.2 MG: 0.2 INJECTION, SOLUTION INTRAMUSCULAR; INTRAVENOUS at 02:43

## 2022-01-01 RX ADMIN — MORPHINE SULFATE 2 MG: 2 INJECTION, SOLUTION INTRAMUSCULAR; INTRAVENOUS at 18:49

## 2022-01-01 RX ADMIN — MORPHINE SULFATE 2 MG: 2 INJECTION, SOLUTION INTRAMUSCULAR; INTRAVENOUS at 06:13

## 2022-01-01 RX ADMIN — SODIUM BICARBONATE 650 MG: 650 TABLET ORAL at 11:35

## 2022-01-01 RX ADMIN — MORPHINE SULFATE 2 MG: 2 INJECTION, SOLUTION INTRAMUSCULAR; INTRAVENOUS at 14:58

## 2022-01-01 RX ADMIN — Medication 0.5 MG: at 20:09

## 2022-01-01 RX ADMIN — METOPROLOL TARTRATE 25 MG: 25 TABLET, FILM COATED ORAL at 09:15

## 2022-01-01 RX ADMIN — LATANOPROST 1 DROP: 50 SOLUTION/ DROPS OPHTHALMIC at 22:05

## 2022-01-01 RX ADMIN — SODIUM CHLORIDE 2 G: 9 INJECTION, SOLUTION INTRAMUSCULAR; INTRAVENOUS; SUBCUTANEOUS at 11:36

## 2022-01-01 RX ADMIN — FUROSEMIDE 40 MG: 10 INJECTION, SOLUTION INTRAMUSCULAR; INTRAVENOUS at 14:43

## 2022-01-01 RX ADMIN — GLYCOPYRROLATE 0.2 MG: 0.2 INJECTION, SOLUTION INTRAMUSCULAR; INTRAVENOUS at 13:18

## 2022-01-01 RX ADMIN — GLYCOPYRROLATE 0.2 MG: 0.2 INJECTION, SOLUTION INTRAMUSCULAR; INTRAVENOUS at 21:12

## 2022-01-01 RX ADMIN — INSULIN GLARGINE 16 UNITS: 100 INJECTION, SOLUTION SUBCUTANEOUS at 14:42

## 2022-01-01 RX ADMIN — CARVEDILOL PHOSPHATE 10 MG: 10 CAPSULE, EXTENDED RELEASE ORAL at 08:12

## 2022-01-05 NOTE — TELEPHONE ENCOUNTER
Per Dr. Binh Berumen patient needs to follow up from the hospital. Patient had a stroke and was seen by Dr. Binh Berumen at [facility].      Patient was discharged 12/31/2021

## 2022-01-06 NOTE — ED TRIAGE NOTES
Patient reports elevated BP today. Patient took his BP medication today. Patient denies HA, CP, blurry vision. Patient is ambulatory in Triage with a walker.

## 2022-01-06 NOTE — ED PROVIDER NOTES
Please note that this dictation was completed with [employer], the computer voice recognition software.  Quite often unanticipated grammatical, syntax, homophones, and other interpretive errors are inadvertently transcribed by the computer software.  Please disregard these errors.  Please excuse any errors that have escaped final proofreading. 70-year-old male past medical history remarkable for intermittent A. fib, arthritis, questionable fibromyalgia, basal cell cancer of the nose ear and cheek, chronic pain, diabetes, hypertension, bilateral glaucoma, previous stroke in 2013 2015, ulcerative colitis, and unspecified sleep apnea on BiPAP presents the ER POV complaining of \" was here on the 30th and it looks like I had had a stroke. \"  Clement Singh doing okay since then had a few changes in the medications. Then I noticed for the past 2 days my blood pressure slowly been going up. Today it gradually climbed and we went by the EMS and they told him he needed to come here and get evaluated further. We have the numbers here if you want to see them\"  Patient denies any current complaints physical pains numbness tingling vision changes difficulty swallowing or imbalance issues. Patient does state he ambulates with a walker at baseline and had some walking I have no residual effects of the previous stroke.     She states he has been compliant with his current medication regimen has an appointment tomorrow at 11 AM to see his PCP regarding his blood pressure medication    pt denies HA, vison changes, diff swallowing, CP, SOB, Abd pain, F/Ch, N/V, D/Cons or other current systemic complaints    Social/ PSH reviewed in EMR    EMR Chart Reviewed           Past Medical History:   Diagnosis Date    Arrhythmia     a fib on occasion    Arthritis     Autoimmune disease ([address]     Questionable Fibromyalgia    Cancer ([address]     basal cell on nose, ear and cheek    Chronic pain     Diabetes ([address]     Hypertension     Other and unspecified hyperlipidemia 4/7/2013    Other ill-defined conditions(799.89)     bilateral glaucoma    Other ill-defined conditions(799.89)     UC    Sleep apnea     Stool color black     Stroke [facility]) 11/2010 4/13, 6/15  TIA    UC (ulcerative colitis) [facility] 10/14/2009    Unspecified sleep apnea     on bipap       Past Surgical History:   Procedure Laterality Date    HX COLONOSCOPY  12/2014    HX ORTHOPAEDIC      left knee arthroscopy         Family History:   Problem Relation Age of Onset    Stroke Mother     Dementia Mother     Cancer Father         pancreatic    Stroke Brother     Cancer Brother     Stroke Brother        Social History     Socioeconomic History    Marital status:      Spouse name: Not on file    Number of children: Not on file    Years of education: Not on file    Highest education level: Not on file   Occupational History    Not on file   Tobacco Use    Smoking status: Never Smoker    Smokeless tobacco: Never Used   Vaping Use    Vaping Use: Never used   Substance and Sexual Activity    Alcohol use: No    Drug use: No    Sexual activity: Yes   Other Topics Concern    Not on file   Social History Narrative    Not on file     Social Determinants of Health     Financial Resource Strain:     Difficulty of Paying Living Expenses: Not on file   Food Insecurity:     Worried About Running Out of Food in the Last Year: Not on file    Alberto of Food in the Last Year: Not on file   Transportation Needs:     Lack of Transportation (Medical): Not on file    Lack of Transportation (Non-Medical):  Not on file   Physical Activity:     Days of Exercise per Week: Not on file    Minutes of Exercise per Session: Not on file   Stress:     Feeling of Stress : Not on file   Social Connections:     Frequency of Communication with Friends and Family: Not on file    Frequency of Social Gatherings with Friends and Family: Not on file    Attends Hindu Services: Not on file  Active Member of Clubs or Organizations: Not on file    Attends Club or Organization Meetings: Not on file    Marital Status: Not on file   Intimate Partner Violence:     Fear of Current or Ex-Partner: Not on file    Emotionally Abused: Not on file    Physically Abused: Not on file    Sexually Abused: Not on file   Housing Stability:     Unable to Pay for Housing in the Last Year: Not on file    Number of [place] in the Last Year: Not on file    Unstable Housing in the Last Year: Not on file         ALLERGIES: Statins-hmg-coa reductase inhibitors    Review of Systems   Constitutional: Negative for activity change, appetite change, chills and fever. HENT: Negative for drooling, sore throat, trouble swallowing and voice change. Eyes: Negative for pain and visual disturbance. Respiratory: Negative for chest tightness and shortness of breath. Cardiovascular: Negative for chest pain and palpitations. Gastrointestinal: Negative for abdominal pain, constipation, diarrhea, nausea and vomiting. Genitourinary: Negative for dysuria. Skin: Negative for rash. Neurological: Negative for dizziness, facial asymmetry, speech difficulty, numbness and headaches. Psychiatric/Behavioral: Negative for confusion. All other systems reviewed and are negative. Vitals:    01/06/22 1820 01/06/22 1821   BP:  (!) 180/92   Pulse: 100    Resp: 16    Temp: 97.3 °F (36.3 °C)    SpO2: 96%    Weight: 83.9 kg (185 lb)    Height: 5' 8\" (1.727 m)             Physical Exam  Vitals and nursing note reviewed. Constitutional:       General: He is not in acute distress. Appearance: Normal appearance. He is well-developed. He is obese. He is not ill-appearing, toxic-appearing or diaphoretic. Comments: NAD, AxOx4, speaking in complete sentences    gcs = 15    Ambulating w/ a walker at baseline;    HENT:      Head: Normocephalic and atraumatic.       Comments: Cn intact         Right Ear: External ear normal.      Left Ear: External ear normal.      Mouth/Throat:      Pharynx: No oropharyngeal exudate. Eyes:      General: No scleral icterus. Right eye: No discharge. Left eye: No discharge. Extraocular Movements: Extraocular movements intact. Conjunctiva/sclera: Conjunctivae normal.      Pupils: Pupils are equal, round, and reactive to light. Cardiovascular:      Rate and Rhythm: Normal rate and regular rhythm. Pulses: Normal pulses. Heart sounds: Normal heart sounds. No murmur heard. No friction rub. No gallop. Pulmonary:      Effort: Pulmonary effort is normal. No respiratory distress. Breath sounds: Normal breath sounds. No stridor. No wheezing, rhonchi or rales. Chest:      Chest wall: No tenderness. Abdominal:      General: Bowel sounds are normal. There is no distension. Palpations: Abdomen is soft. There is no mass. Tenderness: There is no abdominal tenderness. There is no guarding or rebound. Genitourinary:     Comments: Pt denies urinary/ Testicular/ scrotal or penile  complaints  Musculoskeletal:         General: No swelling, tenderness, deformity or signs of injury. Normal range of motion. Cervical back: Normal range of motion and neck supple. No tenderness. Right lower leg: No edema. Left lower leg: No edema. Lymphadenopathy:      Cervical: No cervical adenopathy. Skin:     General: Skin is warm and dry. Capillary Refill: Capillary refill takes less than 2 seconds. Coloration: Skin is not jaundiced or pale. Findings: No bruising, erythema, lesion or rash. Neurological:      General: No focal deficit present. Mental Status: He is alert and oriented to person, place, and time. Cranial Nerves: No cranial nerve deficit. Sensory: No sensory deficit. Motor: No weakness.       Coordination: Coordination normal.      Gait: Gait normal.      Deep Tendon Reflexes: Reflexes normal.      Comments: pt has motor/ CV/ Sensation grossly intact to all extremities, R = L in strength;          MDM       Procedures    Chief Complaint   Patient presents with    Hypertension       6:25 PM  The patients presenting problems have been discussed, and they are in agreement with the care plan formulated and outlined with them. I have encouraged them to ask questions as they arise throughout their visit. MEDICATIONS GIVEN:  Medications - No data to display    LABS REVIEWED:  Labs Reviewed - No data to display    RADIOLOGY RESULTS:  The following have been ordered and reviewed:  _____________________________________________________________________  _____________________________________________________________________    EKG interpretation:   Rhythm: normal sinus rhythm; and regular . Rate (approx.): 86; Axis: normal; P wave: normal; QRS interval: normal ; ST/T wave: normal; Negative acute significant segmental elevations/ unchanged compared to study dated 12/31/20211    PROCEDURES:        CONSULTATIONS:       PROGRESS NOTES:      DIAGNOSIS:    1. Secondary hypertension              ED COURSE: The patients hospital course has been uncomplicated. 2030  Change of shift. Care of patient signed over to Karin Hart. Bedside handoff complete. Awaiting Labs; .

## 2022-01-06 NOTE — PROGRESS NOTES
Stephanie White (: 1930) is a 80 y.o. male, established patient, here for evaluation of the following chief complaint(s):  Hospital Follow Up (stroke) and ED Follow-up (went to the ED on 21 for HTN)       ASSESSMENT/PLAN:  Below is the assessment and plan developed based on review of pertinent history, physical exam, labs, studies, and medications. 1. Cerebrovascular accident (CVA), unspecified mechanism (HCC)-he wants to try a statin  -     pravastatin (PRAVACHOL) 10 mg tablet; Take 1 Tablet by mouth nightly., Normal, Disp-30 Tablet, R-3  2. PMR (polymyalgia rheumatica) (HCC)- cont prednisone 1.0 mg a day   3. Ulcerative colitis with complication, unspecified location (HCC)-he has some anemia and is going to wait to hear from gastroenteroolgy   4. Stage 3b chronic kidney disease ([facility]- +he does have stone and they are going to meet with urologist and see if that needs to be addressed  5. Prediabetes-cont to watch what he is eating   6. Essential hypertension, benign-start this in addition to what he is taking and follow up in a month   -     amLODIPine (NORVASC) 5 mg tablet; Take 1 Tablet by mouth daily. , Normal, Disp-90 Tablet, R-1  7. Hyperlipidemia LDL goal <100-he had muscle aches in past but we will try low dose of this   -     pravastatin (PRAVACHOL) 10 mg tablet; Take 1 Tablet by mouth nightly., Normal, Disp-30 Tablet, R-3      No follow-ups on file. SUBJECTIVE/OBJECTIVE:  HPI  Pt was admitted on 2021 for new onset stroke on MRI Admitted for recent episode of aphasia lasting for hours, associated with confusion. Brain MRI shows small right posterior frontal cortical infarct  Patient is back to baseline and without any focal neurologic deficit     He was discharged with addition of aspirin 81 mg a day to his Plavix 75 mg a day. Patient reports statin intolerance when changed from a branded statin to a generic version. He cannot recall what branded statin he did tolerate. Prediabetes- his sugars have been stable and under control with metformin     Subjective: Apple Fields is a 80 y.o. male with hypertension. Hypertension ROS: taking medications as instructed, no medication side effects noted, home BP monitoring in range of 883'Z systolic over 03'K diastolic, s, no chest pain on exertion, no dyspnea on exertion, no swelling of ankles. New concerns: has been stbale . PMR- this has been stable with low dose of prednisone    Review of Systems   All other systems reviewed and are negative. Physical Exam  Vitals and nursing note reviewed. Constitutional:       Appearance: He is well-developed. HENT:      Head: Normocephalic and atraumatic. Right Ear: External ear normal.      Left Ear: External ear normal.      Nose: Nose normal.   Eyes:      Conjunctiva/sclera: Conjunctivae normal.      Pupils: Pupils are equal, round, and reactive to light. Neck:      Thyroid: No thyromegaly. Vascular: No carotid bruit, hepatojugular reflux or JVD. Cardiovascular:      Rate and Rhythm: Normal rate and regular rhythm. Heart sounds: Normal heart sounds. Pulmonary:      Effort: Pulmonary effort is normal.      Breath sounds: Normal breath sounds. Abdominal:      General: Bowel sounds are normal.      Palpations: Abdomen is soft. Musculoskeletal:         General: Normal range of motion. Cervical back: Normal range of motion and neck supple. Skin:     General: Skin is warm and dry. Neurological:      Mental Status: He is alert and oriented to person, place, and time. Psychiatric:         Behavior: Behavior normal.         Thought Content:  Thought content normal.         Judgment: Judgment normal.           On this date 01/07/2022 I have spent 45 minutes reviewing previous notes, test results and face to face with the patient discussing the diagnosis and importance of compliance with the treatment plan as well as documenting on the day of the visit. An electronic signature was used to authenticate this note.   -- Isai Torres MD

## 2022-01-06 NOTE — TELEPHONE ENCOUNTER
Dr. Jc Mcbride  Patient D/C'd from hospital, told to keep BP close to 120/80 but was not at goal on discharge. Seeing you on 1/7 at 11 a.m. Received call from [place] at [facility] with Red Flag Complaint. Subjective: Caller states \"Dr. Forbes's nurse said he needed to be seen by Dr. Jc Mcbride today. \"     Current Symptoms: Today BP was 167/102    No changed symptoms   \"he says he feels fine\"    Onset: 1 day ago; unchanged    Associated Symptoms: NA    Pain Severity: no pain    Temperature: none    What has been tried: none    LMP: NA Pregnant: NA    Recommended disposition: be seen soonest available appt    Care advice provided, patient verbalizes understanding; denies any other questions or concerns; instructed to call back for any new or worsening symptoms. Unable to get ECC agent so called [place] office. Warm transfer to [address] [place] [address] who made appt for tomorrow. Attention Provider: Thank you for allowing me to participate in the care of your patient. The patient was connected to triage in response to information provided to the ECC. Please do not respond through this encounter as the response is not directed to a shared pool.       Reason for Disposition   Patient wants to be seen    Protocols used: BLOOD PRESSURE - HIGH-ADULT-[place]

## 2022-01-07 NOTE — DISCHARGE INSTRUCTIONS
Thank you for allowing us to provide you with medical care today. We realize that you have many choices for your emergency care needs. We thank you for choosing [facility]. Please choose us in the future for any continued health care needs. We hope we addressed all of your medical concerns. We strive to provide excellent quality care in the Emergency Department. Anything less than excellent does not meet our expectations. The exam and treatment you received in the Emergency Department were for an emergent problem and are not intended as complete care. It is important that you follow up with a doctor, nurse practitioner, or [address] [place] [address] assistant for ongoing care. If your symptoms worsen or you do not improve as expected and you are unable to reach your usual health care provider, you should return to the Emergency Department. We are available 24 hours a day. Take this sheet with you when you go to your follow-up visit. If you have any problem arranging the follow-up visit, contact the Emergency Department immediately. Make an appointment your family doctor for follow up of this visit. Return to the ER if you are unable to be seen in a timely manner.

## 2022-01-11 NOTE — DISCHARGE INSTRUCTIONS
HOSPITALIST DISCHARGE INSTRUCTIONS  NAME: Caitlyn Perrin   :  1930   MRN:  [id number]     Date/Time:  2021 12:51 PM    ADMIT DATE: 2021     DISCHARGE DATE: 2021     ADMITTING DIAGNOSIS:  Stroke     DISCHARGE DIAGNOSIS:  Stroke   Severe aortic valve stenosis     MEDICATIONS:  · It is important that you take the medication exactly as they are prescribed. · Keep your medication in the bottles provided by the pharmacist and keep a list of the medication names, dosages, and times to be taken in your wallet. · Do not take other medications without consulting your doctor. Pain Management: per above medications    What to do at Home    Recommended diet:  Cardiac diet     Recommended activity: Activity as tolerated    If you experience any of the following symptoms then please call your primary care physician or return to the emergency room if you cannot get hold of your doctor:  Fever, chills, nausea, vomiting, diarrhea, change in mentation, falling, bleeding, shortness of breath, ***    Follow Up:  Dr. Gui Henderson MD  you are to call and set up an appointment to see them in 2 weeks. Follow-up Information     Follow up With Specialties Details Why Fanny Brewster MD Neurology, Pain Management Schedule an appointment as soon as possible for a visit in 4 weeks Call to schedule follow up visit in 4 weeks, [facility] Neurologist, after recent stroke.   [address]  [address] [facility]  [phone]          Please follow-up with cardiology        Pascual Orozco  Physician  Specialty   Cardiology, Interventional Cardiology       Apscualmisa Orozco  Physician    Provider Summary    Title Provider type   MD Physician     Primary Contact Information    Phone Fax E-mail Address   [phone] Not available [address] [place] [address]    [address] [place] [address]         Information obtained by :  I understand that if any problems occur once I am at home I am to contact my physician. I understand and acknowledge receipt of the instructions indicated above.                                                                                                                                            Physician's or R.N.'s Signature                                                                  Date/Time                                                                                                                                              Patient or Representative Signature                                                          Date/Time

## 2022-01-11 NOTE — DISCHARGE SUMMARY
Physician Discharge Summary     Patient ID:  Hipolito Salazar  [id number]  80 y.o.  12/26/1930    Admit date: 12/30/2021    Discharge date and time: 12/31/2021    Admission Diagnoses: CVA (cerebral vascular accident) [facility]) [I63.9]    Discharge Diagnoses/Hospital Course   Mr. Marek Castro is a 79 yo male with PMH of CKD, HTN, UC, XOL admitted for CVA. MRI showed \"1. Small acute cortical infarct right posterior frontal lobe\". He was started on ASA in addition to his plavix. LDL was NOT at goal however pt is intolerant of statins. He will d/w his PCP which statin he was previously on and trial resumption of this statin as an outpt. His A1c was 5.6. He cleared PT/OT. He was given an Rx for outpt PT should he desire to pursue this. TTE noted:     Contrast used: Definity.   Left Ventricle: Left ventricle size is normal. Normal wall thickness. Normal wall motion. Normal left ventricular systolic function with a visually estimated EF of 55 - 60%. Grade I diastolic dysfunction with normal LAP.   Aortic Valve: Moderately calcified cusps. Mild transvalvular regurgitation. Severe stenosis.   Left Atrium: Left atrium is mildly dilated.     Discussed following up with cardiology as an outpt.  With pt's age and co-morbidities unlikely a candidate for TAVR     PCP: Joe Storm MD     Consults: Neurology    Discharge Exam:  Visit Vitals  BP (!) 146/78 (BP 1 Location: Right upper arm, BP Patient Position: Semi fowlers)   Pulse 97   Temp 98 °F (36.7 °C)   Resp 19   Ht 5' 8\" (1.727 m)   Wt 84.4 kg (186 lb)   SpO2 95%   BMI 28.28 kg/m²     Gen:  Well-developed, well-nourished, in no acute distress  HEENT:  Pink conjunctivae, PERRL, hearing intact to voice, moist mucous membranes  Neck:  Supple, without masses, thyroid non-tender  Resp:  No accessory muscle use, clear breath sounds without wheezes rales or rhonchi  Card:  No murmurs, normal S1, S2 without thrills, bruits or peripheral edema  Abd:  Soft, non-tender, non-distended, normoactive bowel sounds are present, no palpable organomegaly  Lymph:  No cervical adenopathy  Musc:  No cyanosis or clubbing  Skin:  No rashes or ulcers, skin turgor is good  Neuro:  Cranial nerves 3-12 are grossly intact,  strength is 5/5 bilaterally, dorsi / plantarflexion strength is 5/5 bilaterally, follows commands appropriately  Psych:  Alert with good insight. Oriented to person, place, and time       Disposition: home    Patient Instructions:   Discharge Medication List as of 12/31/2021  2:38 PM      START taking these medications    Details   aspirin 81 mg chewable tablet Take 1 Tablet by mouth daily. , Normal, Disp-30 Tablet, R-0      OTHER,NON-FORMULARY, Outpatient PT, Print, Disp-1 Each, R-0         CONTINUE these medications which have CHANGED    Details   furosemide (LASIX) 20 mg tablet Take 1 Tablet by mouth two (2) times daily as needed (Lower extremity swelling). , No Print, Disp-60 Tablet, R-0         CONTINUE these medications which have NOT CHANGED    Details   acetaminophen (Tylenol Arthritis Pain) 650 mg TbER Take 650 mg by mouth two (2) times daily as needed for Pain., Historical Med      cholecalciferol (Vitamin D3) (1000 Units /25 mcg) tablet Take 1,000 Units by mouth daily. , Historical Med      metFORMIN ER (GLUCOPHAGE XR) 500 mg tablet Take 500 mg by mouth two (2) times a day., Historical Med      dorzolamide-timoloL (COSOPT) 22.3-6.8 mg/mL ophthalmic solution Administer 1 Drop to both eyes two (2) times a day., Historical Med      folic acid (FOLVITE) 1 mg tablet TAKE 1 TABLET DAILY, Normal, Disp-90 Tablet, R-3      clopidogreL (PLAVIX) 75 mg tab TAKE 1 TABLET DAILY, Normal, Disp-90 Tablet, R-3      predniSONE (DELTASONE) 1 mg tablet Take 1 Tablet by mouth daily (with breakfast). , Normal, Disp-90 Tablet, R-3      vit A,C,E-zinc-copper (PreserVision AREDS) cap capsule Take 1 Capsule by mouth two (2) times a day., Historical Med      sulfaSALAzine (AZULFIDINE) 500 mg tablet Take 2 Tablets by mouth two (2) times a day., Normal, Disp-360 Tablet, R-0      turmeric-herbal complex no. 278 150 mg cap Take 1,000 mg by mouth., Historical Med      ezetimibe (ZETIA) 10 mg tablet Take 10 mg by mouth daily. , Historical Med      COREG CR 10 mg CR capsule Take 10 mg by mouth daily. , Historical Med, SUMANTH      cyanocobalamin (VITAMIN B-12) 1,000 mcg tablet Take 1,000 mcg by mouth daily. , Historical Med      omega-3 fatty acids-vitamin e (FISH OIL) 1,000 mg Cap Take 1 Capsule by mouth two (2) times a day., Historical Med      benazepril (LOTENSIN) 40 mg tablet Take 40 mg by mouth daily. , Historical Med      bimatoprost (LUMIGAN) 0.03 % ophthalmic drops Administer 1 Drop to both eyes every evening., Historical Med           Activity: No driving for six months   Diet: Regular Diet  Wound Care: None needed    Follow-up with Tammy Sullivan MD   Follow-up Information     Follow up With Specialties Details Why Amina Alba MD Neurology, Pain Management Schedule an appointment as soon as possible for a visit in 4 weeks Call to schedule follow up visit in 4 weeks, [facility] Neurologist, after recent stroke.   [address] [facility]  [phone]      Mike Olivera MD Internal Medicine   [place] [id number] [facility]  [address] [place] [address]  [phone]          Approximate time spent in patient care on day of discharge: 35 minutes     Signed:  Torey Phan MD  1/11/2022  6:05 PM

## 2022-01-14 NOTE — LETTER
1/14/2022    Patient: Reina Man   YOB: 1930   Date of Visit: 1/14/2022     Placido Ramsay MD  [address] [facility]  [address]  [address] [place] [address]  [address] [facility] [address]    Dear Placido Ramsay MD,      Thank you for referring Mr. Helena De León to [facility] for evaluation. My notes for this consultation are attached. If you have questions, please do not hesitate to call me. I look forward to following your patient along with you.       Sincerely,    Saad Dash MD

## 2022-01-14 NOTE — PROGRESS NOTES
Chief Complaint   Patient presents with   [facility] Follow Up     s/p stroke 12/27/21, he states he is doing well, except some memory issues, accompanied by his wife      Visit Vitals  /76 (BP 1 Location: Left upper arm, BP Patient Position: Sitting)   Pulse 78   Resp 16   Ht 5' 8\" (1.727 m)   Wt 85.3 kg (188 lb)   SpO2 95%   BMI 28.59 kg/m²

## 2022-01-14 NOTE — PATIENT INSTRUCTIONS
Recommend having your cholesterol rechecked in 8 weeks with PCP to see if the current dose of your Pravachol has lowered your bad cholesterol LDL to less than 70, the goal if you have had a TIA or stroke in the past.     Continue on both Aspirin 81 mg/ day and Plavix 75 mg/ day. At your follow up visit in 3 months, we will reassess whether to continue both or stop one of the meds.      Follow up in 3 months

## 2022-01-14 NOTE — PROGRESS NOTES
Joselo Espinal (12/26/1930) is a 80 y.o. male, established patient, here for evaluation of the following     Chief complaint(s):   Chief Complaint   Patient presents with   [facility] Follow Up     s/p stroke 12/27/21, he states he is doing well, except some memory issues, accompanied by his wife        SUBJECTIVE/ OBJECTIVE:    HPI: 80 y.o. male with PMHx Charcot-Sierra-Tooth disease (hereditary neuropathy)    Following up after recent stroke (seen at [address] [place] [address] [facility] on 12-31-21)  Brain MRI showed small right posterior frontal cortical infarct  Pt's symptoms (aphasia, confusion) had fully resolved by the time I saw him  ASA 81 mg/ day was added to his home regimen of Plavix 75 mg/ day   He reported statin intolerance except to a specific brand of statin he had in past, but couldn't recall name. We agreed after discharge, he would ask his PCP to restart him on that specific, branded version of statin. He appears to have been restarted on Pravastatin 10 mg QHS. Wife accompanies today. Says that on 1-6-21 his BP was high (190s/ 100) so family took him to ER. While there it gradually came down a little \"160s\" so was discharged from ER and had f/u hte next day with PCP. BP today looks good. Wife reports that patient has very slight, minimal difficulty since his recent stroke, otherwise back to his baseline. No recurrence of stroke or TIA symptoms. Remains on aspirin, Plavix, and the pravastatin.    ========================================    Brief Hx:      From Neurology Consult dated 12-31-21, Impression/ Plan:     80 y.o. male with history of Charcot-Sierra-Tooth syndrome (diagnosed 8 to 10 years ago by Dr. Xuan Sanders Neurologist). Admitted for recent episode of aphasia lasting for hours, associated with confusion. Brain MRI shows small right posterior frontal cortical infarct. Patient is back to baseline and without any focal neurologic deficit.   Agree with addition of aspirin 81 mg a day to his Plavix 75 mg a day. Patient reports statin intolerance when changed from a branded statin to a generic version. He cannot recall what branded statin he did tolerate. Recommended that he speak with PCP to find out what statin did not bother him, and then ask PCP to prescribe that brand. From a Neurology standpoint can be discharged today if BP improves and no significant abnormalities on TTE. Follow up with me in Neurology Clinic in 4 weeks        Allergies   Allergen Reactions    Statins-Hmg-Coa Reductase Inhibitors Myalgia         Current Outpatient Medications:     bimatoprost (LUMIGAN) 0.03 % ophthalmic drops, 1 gtt in each eye, Disp: , Rfl:     dorzolamide (TRUSOPT) 2 % ophthalmic solution, 1 gtt, Disp: , Rfl:     timoloL (BETIMOL) 0.25 % ophthalmic solution, Administer 1-2 Drops to both eyes two (2) times a day. use in affected eye(s), Disp: , Rfl:     amLODIPine (NORVASC) 5 mg tablet, Take 1 Tablet by mouth daily. , Disp: 90 Tablet, Rfl: 1    pravastatin (PRAVACHOL) 10 mg tablet, Take 1 Tablet by mouth nightly., Disp: 30 Tablet, Rfl: 3    aspirin 81 mg chewable tablet, Take 1 Tablet by mouth daily. , Disp: 30 Tablet, Rfl: 0    furosemide (LASIX) 20 mg tablet, Take 1 Tablet by mouth two (2) times daily as needed (Lower extremity swelling). , Disp: 60 Tablet, Rfl: 0    acetaminophen (Tylenol Arthritis Pain) 650 mg TbER, Take 650 mg by mouth two (2) times daily as needed for Pain., Disp: , Rfl:     cholecalciferol (Vitamin D3) (1000 Units /25 mcg) tablet, Take 1,000 Units by mouth daily. , Disp: , Rfl:     metFORMIN ER (GLUCOPHAGE XR) 500 mg tablet, Take 500 mg by mouth two (2) times a day., Disp: , Rfl:     dorzolamide-timoloL (COSOPT) 22.3-6.8 mg/mL ophthalmic solution, Administer 1 Drop to both eyes two (2) times a day., Disp: , Rfl:     folic acid (FOLVITE) 1 mg tablet, TAKE 1 TABLET DAILY, Disp: 90 Tablet, Rfl: 3    clopidogreL (PLAVIX) 75 mg tab, TAKE 1 TABLET DAILY, Disp: 90 Tablet, Rfl: 3   predniSONE (DELTASONE) 1 mg tablet, Take 1 Tablet by mouth daily (with breakfast). , Disp: 90 Tablet, Rfl: 3    vit A,C,E-zinc-copper (PreserVision AREDS) cap capsule, Take 1 Capsule by mouth two (2) times a day., Disp: , Rfl:     sulfaSALAzine (AZULFIDINE) 500 mg tablet, Take 2 Tablets by mouth two (2) times a day., Disp: 360 Tablet, Rfl: 0    turmeric-herbal complex no. 278 150 mg cap, Take 1,000 mg by mouth., Disp: , Rfl:     ezetimibe (ZETIA) 10 mg tablet, Take 10 mg by mouth daily. , Disp: , Rfl:     COREG CR 10 mg CR capsule, Take 10 mg by mouth daily. , Disp: , Rfl:     cyanocobalamin (VITAMIN B-12) 1,000 mcg tablet, Take 1,000 mcg by mouth daily. , Disp: , Rfl:     omega-3 fatty acids-vitamin e (FISH OIL) 1,000 mg Cap, Take 1 Capsule by mouth two (2) times a day., Disp: , Rfl:     benazepril (LOTENSIN) 40 mg tablet, Take 40 mg by mouth daily. , Disp: , Rfl:     bimatoprost (LUMIGAN) 0.03 % ophthalmic drops, Administer 1 Drop to both eyes every evening., Disp: , Rfl:      has a past medical history of Arrhythmia, Arthritis, Autoimmune disease ([address] Cancer ([address] Chronic pain, Diabetes ([address] Hypertension, Other and unspecified hyperlipidemia (4/7/2013), Other ill-defined conditions(799.89), Other ill-defined conditions(799.89), Sleep apnea, Stool color black, Stroke ([address] (11/2010), UC (ulcerative colitis) ([address] (10/14/2009), and Unspecified sleep apnea. He has no past medical history of Abuse, Adult physical abuse, Anemia NEC, CAD (coronary artery disease), Calculus of kidney, Congestive heart failure, unspecified, Contact dermatitis and other eczema, due to unspecified cause, Depression, Headache(784.0), or Psychotic disorder ([address] has a past surgical history that includes hx orthopaedic and hx colonoscopy (12/2014).       Physical Exam:    Vitals:    01/14/22 1131   BP: 136/76   BP 1 Location: Left upper arm   BP Patient Position: Sitting   Pulse: 78   Resp: 16   Height: 5' 8\" (1.727 m) Weight: 85.3 kg (188 lb)   SpO2: 95%     Awake, resting in chair, alert, conversant. Normal hearing. No dysarthria or aphasia. No resting tremors. 5 out of 5 strength in both arms and legs. Relation not observed but uses rolling walker at baseline.    =======================    ASSESSMENT/ PLAN:       ICD-10-CM ICD-9-CM    1. Cerebrovascular accident (CVA), unspecified mechanism ([facility]  I63.9 434.91    2. Cerebral atherosclerosis  I67.2 437.0         Discussed with patient/ wife and provided the following information in the after-visit summary:     Recommend having your cholesterol rechecked in 8 weeks with PCP to see if the current dose of your Pravachol has lowered your bad cholesterol LDL to less than 70, the goal if you have had a TIA or stroke in the past.     Continue on both Aspirin 81 mg/ day and Plavix 75 mg/ day. At your follow up visit in 3 months, we will reassess whether to continue both or stop one of the meds. Follow up in 3 months            An electronic signature was used to authenticate this note.   -- Lucia Eldridge MD

## 2022-01-31 NOTE — PROGRESS NOTES
Deshawn Canales MD., [facility] [place]    [address] [place], [address] [facility]    Office [phone],[facility] [phone]           [facility] Sharon Gonzalez is a 80 y.o. male referred for evaluation of possibe cardiac sources of thromboembolism - hx of CVA Consult requested by Kemal Tamayo MD    CC - as documented in EMR    Dear Dr. Kemal Tamayo MD    I had the pleasure of seeing Mr. Joselo Espinal in the office today. Assessment:     Dyspnea  Severe aortic stenosis by echocardiogram 12/31/2021  ? PAF-mentioned in past medical history in the chart but patient denies history of A. fib. History of CVA 12/30/2021  HTN  D  [facility] admn 12/30/21 - 12/31/21 - CVA - Small acute cortical infarct right posterior frontal lobe. CKD  History of ulcerative colitis/arthritis-on prednisone  History of NATHAN-does not use CPAP  Elevated HStroponin during admission 12/30/2021  > Also had elevated HStrop 1/6/22 presumed to have underlying CAD. Advanced age    Plan:      Repeat echocardiogram to reevaluate aortic stenosis    Had extensive discussion with patient/wife regarding pathophysiology of aortic stenosis/TAVR. Patient states that he is still active in spite of his age and wants to proceed with intervention. Will need LHC/RHC. Discussed with Dr. [facility]. He will get him an appointment to see Dr. Isidro Rodriguez ASAP. Continue GDMT for CAD. Continue aspirin/Plavix (on Plavix for CVA). Not on beta-blockers. Will consider adding low-dose metoprolol next visit. On Norvasc. Monitor weight/Lasix as needed    Blood pressure controlled. Home blood pressures vary from 1 10-1 50. Mostly in the 120s to 130s. Creatinine 1.6 (1/6/22)    Patient understands the plan. All questions were answered to the patient's satisfaction. I appreciate the opportunity to be involved in Mr. Burris. See note below for details. Please do not hesitate to contact us   with questions or concerns.     Harvey VEGA Artie Andrade MD      Cardiac Testing/ Procedures: A. Cardiac Cath/PCI:    B.ECHO/KASEY: 12/31/21   Left Ventricle: Left ventricle size is normal. Normal wall thickness. Normal wall motion. Normal left ventricular systolic function with a visually estimated EF of 55 - 60%. Grade I diastolic dysfunction with normal LAP.   Aortic Valve: Moderately calcified cusps. Mild transvalvular regurgitation. Severe stenosis.   Left Atrium: Left atrium is mildly dilated. 3/31/17 - Left ventricle: Systolic function was normal. Ejection fraction was  estimated in the range of 55 % to 60 %. There were no regional wall motion  abnormalities. Doppler parameters were consistent with abnormal left  ventricular relaxation (grade 1 diastolic dysfunction).    Left atrium: The atrium was mildly dilated.     Aortic valve: Normal valve structure. Leaflets exhibited mild to moderate  calcification and sclerosis. Transaortic velocity was increased due to  valvular stenosis. There was mild stenosis. There was mild regurgitation.     Tricuspid valve: There was mild regurgitation. C.StressNuclear/Stress ECHO/Stress test:    D.Vascular:    E. EP:    F. Miscellaneous:    History:     Joselo Wood is a 80 y.o. male who is referred for evaluation of aortic stenosis    [facility] admn 12/30/21 - 12/31/21 - CVA - Small acute cortical infarct right posterior frontal lobe. He also had an echocardiogram done which showed severe aortic stenosis with normal EF. Patient is accompanied by his wife. Able to walk with a walker but has dyspnea with exertion. Mild intermittent chest tightness but denies pain and attributes it to his breathing. Occasional dizziness but no syncope. Mild swelling lower extremities. He was discharged on Lasix but since he started feeling dehydrated he has stopped taking the Lasix. Also has lost about 78 pounds since his discharge and the wife attributes it to not eating well.       Past Medical/Surgical and Family/Social History:     Past Medical History:   Diagnosis Date    Arrhythmia     a fib on occasion    Arthritis     Autoimmune disease ([facility] [address]     Questionable Fibromyalgia    Cancer ([facility] [address]     basal cell on nose, ear and cheek    Chronic pain     Diabetes ([facility] [address] [facility]     Hypertension     Other and unspecified hyperlipidemia 4/7/2013    Other ill-defined conditions(799.89)     bilateral glaucoma    Other ill-defined conditions(799.89)     UC    Sleep apnea     Stool color black     Stroke ([facility] [address] 11/2010 4/13, 6/15  TIA    UC (ulcerative colitis) ([facility] [address] 10/14/2009    Unspecified sleep apnea     on bipap      Past Surgical History:   Procedure Laterality Date    HX COLONOSCOPY  12/2014    HX ORTHOPAEDIC      left knee arthroscopy     Family History   Problem Relation Age of Onset    Stroke Mother     Dementia Mother     Cancer Father         pancreatic    Stroke Brother     Cancer Brother     Stroke Brother       Social History     Tobacco Use    Smoking status: Never Smoker    Smokeless tobacco: Never Used   Vaping Use    Vaping Use: Never used   Substance Use Topics    Alcohol use: No    Drug use: No            Medications:       Current Outpatient Medications   Medication Sig Dispense    pravastatin (PRAVACHOL) 10 mg tablet Take 1 Tablet by mouth nightly. 90 Tablet    sodium bicarbonate 650 mg tablet Take  by mouth four (4) times daily.  bimatoprost (LUMIGAN) 0.03 % ophthalmic drops 1 gtt in each eye     timoloL (BETIMOL) 0.25 % ophthalmic solution Administer 1-2 Drops to both eyes two (2) times a day. use in affected eye(s)     amLODIPine (NORVASC) 5 mg tablet Take 1 Tablet by mouth daily. 90 Tablet    aspirin 81 mg chewable tablet Take 1 Tablet by mouth daily. 30 Tablet    acetaminophen (Tylenol Arthritis Pain) 650 mg TbER Take 650 mg by mouth two (2) times daily as needed for Pain.  cholecalciferol (Vitamin D3) (1000 Units /25 mcg) tablet Take 1,000 Units by mouth daily.      metFORMIN ER (GLUCOPHAGE XR) 500 mg tablet Take 500 mg by mouth two (2) times a day.  folic acid (FOLVITE) 1 mg tablet TAKE 1 TABLET DAILY 90 Tablet    clopidogreL (PLAVIX) 75 mg tab TAKE 1 TABLET DAILY 90 Tablet    predniSONE (DELTASONE) 1 mg tablet Take 1 Tablet by mouth daily (with breakfast). 90 Tablet    vit A,C,E-zinc-copper (PreserVision AREDS) cap capsule Take 1 Capsule by mouth two (2) times a day.  sulfaSALAzine (AZULFIDINE) 500 mg tablet Take 2 Tablets by mouth two (2) times a day. 360 Tablet    turmeric-herbal complex no. 278 150 mg cap Take 1,000 mg by mouth.  ezetimibe (ZETIA) 10 mg tablet Take 10 mg by mouth daily.  COREG CR 10 mg CR capsule Take 10 mg by mouth daily.  cyanocobalamin (VITAMIN B-12) 1,000 mcg tablet Take 1,000 mcg by mouth daily.  omega-3 fatty acids-vitamin e (FISH OIL) 1,000 mg Cap Take 1 Capsule by mouth two (2) times a day.  benazepril (LOTENSIN) 40 mg tablet Take 40 mg by mouth daily.  bimatoprost (LUMIGAN) 0.03 % ophthalmic drops Administer 1 Drop to both eyes every evening.  dorzolamide (TRUSOPT) 2 % ophthalmic solution 1 gtt (Patient not taking: Reported on 1/31/2022)     furosemide (LASIX) 20 mg tablet Take 1 Tablet by mouth two (2) times daily as needed (Lower extremity swelling). (Patient not taking: Reported on 1/31/2022) 60 Tablet    dorzolamide-timoloL (COSOPT) 22.3-6.8 mg/mL ophthalmic solution Administer 1 Drop to both eyes two (2) times a day. (Patient not taking: Reported on 1/31/2022)      No current facility-administered medications for this visit.        Review of Systems:     Review of Systems:  (bold if positive, if negative)    Gen:  weight lossfatigueEyes:  Visual changesENT:  CVS: Pulm:  GI:  diarrhea  :    MS:  arthritisSkin:  RashPsych:  depression, anxiety,Endo:  Heat intolerance, cold intolerance,  Hem:  Renal:    Neuro:  weakness,          Physical Exam:     Visit Vitals  /80 (BP 1 Location: Left upper arm, BP Patient Position: Sitting, BP Cuff Size: Adult)   Pulse (!) 101   Ht 5' 8\" (1.727 m)   Wt 181 lb (82.1 kg)   SpO2 98%   BMI 27.52 kg/m²          Gen: Well-developed, well-nourished, in no acute distress, elderly  Neck: Supple,No JVD  Resp: No accessory muscle use, decreased air entry bilaterally no rales or rhonchi  Card: Regular Rate,Rythm,Normal S1, S2, 2/6 ESM+  Abd:  Soft, BS+,   MSK: No cyanosis  Skin: No rashes    Neuro: moving all four extremities , follows commands appropriately  Psych:  Good insight, oriented to person, place , alert, Nml Affect  LE: Trace edema    EKG:       Medication Side Effects and Warnings were discussed with patient: yes  Patient Labs were reviewed and/or requested:  yes  Patient Past Records were reviewed and/or requested: yes    Total time:       mins    ATTENTION:   This medical record was transcribed using an electronic medical records/speech recognition system. Although proofread, it may and can contain electronic, spelling and other errors. Corrections may be executed at a later time. Please feel free to contact us for any clarifications as needed.       Michelle Diamond MD

## 2022-01-31 NOTE — PROGRESS NOTES
Rayna Mixon is a 80 y.o. male    Visit Vitals  /80 (BP 1 Location: Left upper arm, BP Patient Position: Sitting, BP Cuff Size: Adult)   Pulse (!) 101   Ht 5' 8\" (1.727 m)   Wt 181 lb (82.1 kg)   SpO2 98%   BMI 27.52 kg/m²       Chief Complaint   Patient presents with   [facility] Patient   [facility] Follow Up    Hypertension    Other     CVA       Chest pain NO  SOB YES  Dizziness NO  Swelling FEET  Recent hospital visit [facility] 12/27/21 STROKE  Refills NO  COVID VACCINE STATUS YES  HAD COVID?  NO

## 2022-01-31 NOTE — LETTER
2/10/2022    Patient: Javier Wade   YOB: 1930   Date of Visit: 1/31/2022     Charo Johnson MD  [address] [facility]  [address]  [address] [place] [address]  Via In [facility] [address]    Dear Charo Johnson MD,      Thank you for referring Mr. Rosanne Singh to [address]  for evaluation. My notes for this consultation are attached. If you have questions, please do not hesitate to call me. I look forward to following your patient along with you.       Sincerely,    Tejal Peñaloza MD

## 2022-02-02 NOTE — TELEPHONE ENCOUNTER
Returned call to patient wife. Two patient indentifiers verified. Spoke with Dr. Jacoby Ayala and he will do a telephone visit with patient and wife. Wife verbalized understanding and denies any further questions at this time.      Future Appointments   Date Time Provider Payton Funez   2/2/2022  4:00 PM MD JOSEPH Greenfield BS AMB   2/10/2022  4:00 PM JERMAINE ZAFAR BS AMB   2/18/2022 11:30 AM Lacey Collins MD [facility] BS AMB   3/16/2022  1:20 PM MD JOSEPH Greenfield BS AMB   4/14/2022 11:40 AM David Elam MD [facility] BS AMB   6/3/2022  1:20 PM MD HONORIO Fry BS AMB   10/24/2022  1:30 PM Nathan Sullivan MD [facility] BS AMB

## 2022-02-02 NOTE — TELEPHONE ENCOUNTER
Scheduled pt with Dr. Divya Doyle at [facility] for [facility] on 2/14/2022 with an arrival time of 10am. Procedure scheduled with Mini Burrell. They were informed of necessary Covid testing on 2/10 and labs by 2/10. Reviewed all instructions. Mini Burrell would like a follow up call from the nurse to discuss possible risk involved in the procedure.        Call back [phone]

## 2022-02-02 NOTE — TELEPHONE ENCOUNTER
Patient's spouse is calling to inquire why this R/L HC was scheduled. Patient's spouse is requesting to discuss this.         [phone]

## 2022-02-02 NOTE — PROGRESS NOTES
Labs ordered for pre op testing for [facility] per GALDINO eLo NP

## 2022-02-03 PROBLEM — J96.01 ACUTE HYPOXEMIC RESPIRATORY FAILURE (HCC): Status: ACTIVE | Noted: 2022-01-01

## 2022-02-03 NOTE — TELEPHONE ENCOUNTER
Cancelled procedure per Dr. Tanika Mahajan. Pt wife called in with c/o patient breathing. Wife stated patient feels like he needs oxygen and cant breath. Wife was informed he should go to the emergency room. She verbalized understanding and denies any further questions at this time.      Future Appointments   Date Time Provider Payton Acevedoi   2/10/2022  4:00 PM [facility] BS AMB   2/18/2022 11:30 AM Randy Wilkinson MD [facility] BS AMB   3/16/2022  1:20 PM Halima Zeng MD [facility] BS AMB   4/14/2022 11:40 AM Riya Vaughn MD [facility] BS AMB   6/3/2022  1:20 PM Jaime Hinojosa MD [facility] BS AMB   10/24/2022  1:30 PM Johny Sullivan MD [facility] BS AMB

## 2022-02-03 NOTE — PROGRESS NOTES
Cardiology Initial Care Encounter    Patient: Erin Washington MRN: [id number]     YOB: 1930  Age: 80 y.o. Sex: male      Admit Date: 2/3/2022       Assessment/Plan     1. Acute HFpEF: EF 55-60% in Dec. pBNP 20K, agree with IV lasix. Likely will not repeat TTE. Rapid COVID neg, sending PCR viral panel     2. Severe AS: scheduled for cath 2/14 for TAVR workup, unable to tolerate cath d/t tachypnea currently. May need to consider transfer to [facility]    3. Elevated troponin: elevated previous admissions, could be d/t demand ischemia/AS, needed LHC/RHC prior to TAVR. Cont ASA/statin, plavix (on initially for CVA). Start BB     4. Acute hypoxic resp failure: on NC, started on abx per primary team    5. PAF: mentioned in medical hx but pt denies any knowledge of this, poor data quality on EKG but appears a-fib on tele. Will start metoprolol bid for rate control    6. CKD stage III: Cr 1.54, trend    7. Hx of CVA: ASA/plavix/statin therapy     8. HTN: takes norvasc at home    9. HLD: cont statin - intolerant of higher doses, on Zetia PTA    10. Hx of UC/arthritis: on chronic low dose steroids        Pt personally seen and examined. Chart reviewed. Agree with advanced NP's history, exam and  A/P with changes/additons. Hx of progressive dyspnea    Blood pressure (!) 137/94, pulse 99, temperature 97.3 °F (36.3 °C), resp. rate 25, height 5' 8\" (1.727 m), weight 181 lb (82.1 kg), SpO2 96 %. CVS-S1-S2 present,   Irr, 3/6 systolic murmur present  RS-    Bibasilar crackles present      A/P:  Acute on chronic HFpEF - IV diuretics  Severe Aortic stenosis    CKD  EKG - Baseline artifact - ? Afib vs Sinus tach with PAC ; Pt has hx of PAF documented in his chart - currently not on anticoagulation. Repeat EKG    Patient recently seen in the office on 1/31/2022 and after detailed discussion about  further invasive procedures, I had recommended that he see Dr. Sushma Lowell to proceed with TAVR.   Patient and his  wife were in agreement but when Dr. Boswell Dates called her yesterday, she had second thoughts about proceeding with the procedures. Patient has multiple comorbidities and also has mild dementia. Will need to assess goals of care versus palliation versus hospice. Will get palliative care consult. Harvey Juárez MD, Kristan Terrell is a 80 y.o.  male, well known to our office, with PMH significant for AS, PAF, CVA - recent in Dec, HTN, HLD, CKD, ulcerative colitis, arthritis, and NATHAN - does not use CPAP. Pt was just seen on 1/31/21 by Dr. Alex Chi. He reports since that visit experiencing worsening shortness of breath to the point where he can not go to the bathroom or speak without feeling SOB. He notes some mild lower ext edema as well. He was not taking a diuretic at home, instructions to take lasix prn but feels dehydrated when using this. He contacted Dr. Jose Reed office today and was instructed to come to the ED. He denies any syncope, CP. No fever or chills. Rapid COVID neg, vaccinated against COVID.     ECHO 12/31/21    Contrast used: Definity.   Left Ventricle: Left ventricle size is normal. Normal wall thickness. Normal wall motion. Normal left ventricular systolic function with a visually estimated EF of 55 - 60%. Grade I diastolic dysfunction with normal LAP.   Aortic Valve: Moderately calcified cusps. Mild transvalvular regurgitation. Severe stenosis.   Left Atrium: Left atrium is mildly dilated. The patient has been referred to cardiology for on going management of AS/CHF. Review of Symptoms:  Constitutional:  +fatigue  ENT: negative   Respiratory:  +SOB  Gastrointestinal: negative  Genitourinary: dysuria, hematuria, frequency   Musculoskeletal:negative  Neurological: negative  Other systems reviewed and negative except as above. Previous cardiac hx  No specialty comments available.   Risk factors: age, HTN, HLD, pre diabetes    Social History Tobacco Use    Smoking status: Never Smoker    Smokeless tobacco: Never Used   Substance Use Topics    Alcohol use: No     Family History   Problem Relation Age of Onset    Stroke Mother     Dementia Mother     Cancer Father         pancreatic    Stroke Brother     Cancer Brother     Stroke Brother        Current Facility-Administered Medications   Medication Dose Route Frequency    sodium chloride (NS) flush 5-10 mL  5-10 mL IntraVENous PRN    piperacillin-tazobactam (ZOSYN) 3.375 g in 0.9% sodium chloride (MBP/ADV) 100 mL MBP  3.375 g IntraVENous Q8H    piperacillin-tazobactam (ZOSYN) 3.375 g in 0.9% sodium chloride (MBP/ADV) 100 mL MBP  3.375 g IntraVENous ONCE    levoFLOXacin (LEVAQUIN) 750 mg in D5W IVPB  750 mg IntraVENous Q48H    furosemide (LASIX) injection 40 mg  40 mg IntraVENous ONCE    sodium chloride (NS) flush 5-40 mL  5-40 mL IntraVENous Q8H    sodium chloride (NS) flush 5-40 mL  5-40 mL IntraVENous PRN    acetaminophen (TYLENOL) tablet 650 mg  650 mg Oral Q6H PRN    Or    acetaminophen (TYLENOL) suppository 650 mg  650 mg Rectal Q6H PRN    polyethylene glycol (MIRALAX) packet 17 g  17 g Oral DAILY PRN    ondansetron (ZOFRAN ODT) tablet 4 mg  4 mg Oral Q8H PRN    Or    ondansetron (ZOFRAN) injection 4 mg  4 mg IntraVENous Q6H PRN    [START ON 2/4/2022] enoxaparin (LOVENOX) injection 40 mg  40 mg SubCUTAneous DAILY    glucose chewable tablet 16 g  4 Tablet Oral PRN    dextrose (D50W) injection syrg 12.5-25 g  25-50 mL IntraVENous PRN    glucagon (GLUCAGEN) injection 1 mg  1 mg IntraMUSCular PRN    insulin glargine (LANTUS) injection 16 Units  0.2 Units/kg SubCUTAneous DAILY    insulin lispro (HUMALOG) injection   SubCUTAneous AC&HS     Current Outpatient Medications   Medication Sig    pravastatin (PRAVACHOL) 10 mg tablet Take 1 Tablet by mouth nightly.  sodium bicarbonate 650 mg tablet Take  by mouth four (4) times daily.     bimatoprost (LUMIGAN) 0.03 % ophthalmic drops 1 gtt in each eye    timoloL (BETIMOL) 0.25 % ophthalmic solution Administer 1-2 Drops to both eyes two (2) times a day. use in affected eye(s)    amLODIPine (NORVASC) 5 mg tablet Take 1 Tablet by mouth daily.  aspirin 81 mg chewable tablet Take 1 Tablet by mouth daily.  furosemide (LASIX) 20 mg tablet Take 1 Tablet by mouth two (2) times daily as needed (Lower extremity swelling). (Patient not taking: Reported on 1/31/2022)    acetaminophen (Tylenol Arthritis Pain) 650 mg TbER Take 650 mg by mouth two (2) times daily as needed for Pain.  cholecalciferol (Vitamin D3) (1000 Units /25 mcg) tablet Take 1,000 Units by mouth daily.  metFORMIN ER (GLUCOPHAGE XR) 500 mg tablet Take 500 mg by mouth two (2) times a day.  folic acid (FOLVITE) 1 mg tablet TAKE 1 TABLET DAILY    clopidogreL (PLAVIX) 75 mg tab TAKE 1 TABLET DAILY    predniSONE (DELTASONE) 1 mg tablet Take 1 Tablet by mouth daily (with breakfast).  vit A,C,E-zinc-copper (PreserVision AREDS) cap capsule Take 1 Capsule by mouth two (2) times a day.  sulfaSALAzine (AZULFIDINE) 500 mg tablet Take 2 Tablets by mouth two (2) times a day.  turmeric-herbal complex no. 278 150 mg cap Take 1,000 mg by mouth.  ezetimibe (ZETIA) 10 mg tablet Take 10 mg by mouth daily.  COREG CR 10 mg CR capsule Take 10 mg by mouth daily.  cyanocobalamin (VITAMIN B-12) 1,000 mcg tablet Take 1,000 mcg by mouth daily.  omega-3 fatty acids-vitamin e (FISH OIL) 1,000 mg Cap Take 1 Capsule by mouth two (2) times a day.  benazepril (LOTENSIN) 40 mg tablet Take 40 mg by mouth daily.  bimatoprost (LUMIGAN) 0.03 % ophthalmic drops Administer 1 Drop to both eyes every evening.        Objective:     Vitals:    02/03/22 1108 02/03/22 1230 02/03/22 1245   BP: (!) 156/91 (!) 151/92 (!) 151/97   Pulse: (!) 108 (!) 109 (!) 106   Resp: 16 27 (!) 31   Temp: 97.3 °F (36.3 °C)     SpO2: 90% 94% 93%   Weight: 82.1 kg (181 lb)     Height: 5' 8\" (1.727 m) Intake and Output:  Current Shift: No intake/output data recorded. Last three shifts: No intake/output data recorded. Gen: Well-developed, well-nourished, tachypneic even with speaking   Neck: Supple, No Carotid Bruit,   Resp: No accessory muscle use, Clear breath sounds, No rales or rhonchi  Card: Irregular Rate,Rythm,Normal S1, S2, 2-3/6 murmurs, no rubs or gallop. No thrills. Abd:  Soft, non-tender, non-distended,BS+,   MSK: No cyanosis  Skin: No rashes    Neuro: moving all four extremities , follows commands appropriately  Psych:  Good insight, oriented to person, place , alert, Nml Affect  LE: Mild edema    EKG: on read - Probable Sinus tachycardia with short [place] with premature supraventricular   complexes and with occasional premature ventricular complexes     TELEMETRY a-fib rate 110's     Lab/Data Review: All lab results for the last 24 hours reviewed.      Signed By: Ld Arndt NP     February 3, 2022

## 2022-02-03 NOTE — ED TRIAGE NOTES
Patient presents with his wife who reports the patient has ongoing intermittent shortness of breath and she felt as though it was worse today so she presented him to the ED for evaluation    She also reports the patient has been having a poor appetite since yesterday along with hot/cold spells. Patients wife denies any fevers-    Patient is well appearing in triage and in no acute distress.

## 2022-02-03 NOTE — ED PROVIDER NOTES
Patient is a 17-year-old male with a history of severe aortic stenosis, occasional A. fib, hypertension, diabetes and sleep apnea who presents to the ED with complaints of difficulty breathing. Symptoms have been present for the past several days, unclear time of onset. Described as \"difficulty catching my breath\" and not associated with any chest pain, cough, fever. Wife called cardiologist office today and was referred to the ED for further evaluation management. It appears patient was undergoing evaluation for aortic valve replacement and had a heart catheterization scheduled for February 14, 2022. Patient reports compliance with his home medications. Denies use of supplemental oxygen at home. No sick contacts. Patient has been vaccinated against Covid. No other complaints today. The history is provided by the patient and medical records. Shortness of Breath  Pertinent negatives include no fever, no cough, no chest pain, no vomiting and no abdominal pain.         Past Medical History:   Diagnosis Date    Arrhythmia     a fib on occasion    Arthritis     Autoimmune disease ([address]     Questionable Fibromyalgia    Cancer ([address]     basal cell on nose, ear and cheek    Chronic pain     Diabetes ([address]     Hypertension     Other and unspecified hyperlipidemia 4/7/2013    Other ill-defined conditions(799.89)     bilateral glaucoma    Other ill-defined conditions(799.89)     UC    Sleep apnea     Stool color black     Stroke ([address] 11/2010 4/13, 6/15  TIA    UC (ulcerative colitis) ([address] 10/14/2009    Unspecified sleep apnea     on bipap       Past Surgical History:   Procedure Laterality Date    HX COLONOSCOPY  12/2014    HX ORTHOPAEDIC      left knee arthroscopy         Family History:   Problem Relation Age of Onset    Stroke Mother     Dementia Mother     Cancer Father         pancreatic    Stroke Brother     Cancer Brother     Stroke Brother        Social History     Socioeconomic History    Marital status:      Spouse name: Not on file    Number of children: Not on file    Years of education: Not on file    Highest education level: Not on file   Occupational History    Not on file   Tobacco Use    Smoking status: Never Smoker    Smokeless tobacco: Never Used   Vaping Use    Vaping Use: Never used   Substance and Sexual Activity    Alcohol use: No    Drug use: No    Sexual activity: Yes   Other Topics Concern    Not on file   Social History Narrative    Not on file     Social Determinants of Health     Financial Resource Strain:     Difficulty of Paying Living Expenses: Not on file   Food Insecurity:     Worried About Running Out of Food in the Last Year: Not on file    Alberto of Food in the Last Year: Not on file   Transportation Needs:     Lack of Transportation (Medical): Not on file    Lack of Transportation (Non-Medical): Not on file   Physical Activity:     Days of Exercise per Week: Not on file    Minutes of Exercise per Session: Not on file   Stress:     Feeling of Stress : Not on file   Social Connections:     Frequency of Communication with Friends and Family: Not on file    Frequency of Social Gatherings with Friends and Family: Not on file    Attends Oriental orthodox Services: Not on file    Active Member of [address] [place] [address] "[employer]" or Organizations: Not on file    Attends Club or Organization Meetings: Not on file    Marital Status: Not on file   Intimate Partner Violence:     Fear of Current or Ex-Partner: Not on file    Emotionally Abused: Not on file    Physically Abused: Not on file    Sexually Abused: Not on file   Housing Stability:     Unable to Pay for Housing in the Last Year: Not on file    Number of [place] in the Last Year: Not on file    Unstable Housing in the Last Year: Not on file         ALLERGIES: Statins-hmg-coa reductase inhibitors    Review of Systems   Constitutional: Positive for fatigue. Negative for fever.    Respiratory: Positive for shortness of breath (see HPI). Negative for cough. Cardiovascular: Negative for chest pain. Gastrointestinal: Negative for abdominal pain, blood in stool and vomiting. Neurological: Negative for syncope. All other systems reviewed and are negative. Vitals:    02/03/22 1108   BP: (!) 156/91   Pulse: (!) 108   Resp: 16   Temp: 97.3 °F (36.3 °C)   SpO2: 90%   Weight: 82.1 kg (181 lb)   Height: 5' 8\" (1.727 m)            Physical Exam  Vitals reviewed. Constitutional:       General: He is in acute distress. Appearance: He is well-developed. HENT:      Head: Normocephalic and atraumatic. Eyes:      Conjunctiva/sclera: Conjunctivae normal.   Cardiovascular:      Rate and Rhythm: Regular rhythm. Tachycardia present. Pulmonary:      Effort: Tachypnea and accessory muscle usage present. Abdominal:      Palpations: Abdomen is soft. Tenderness: There is no abdominal tenderness. There is no guarding. Musculoskeletal:         General: Normal range of motion. Cervical back: Normal range of motion and neck supple. Right lower leg: No tenderness. Left lower leg: No tenderness. Skin:     General: Skin is warm and dry. Neurological:      Mental Status: He is alert and oriented to person, place, and time. Motor: No abnormal muscle tone. MDM  Number of Diagnoses or Management Options  Acute respiratory failure with hypoxia ([address]): new and requires workup  Hyperglycemia: new and requires workup  Hyponatremia: new and requires workup  Pneumonia of both lungs due to infectious organism, unspecified part of lung: new and requires workup  Sepsis with acute hypoxic respiratory failure without septic shock, due to unspecified organism [facility]): new and requires workup  Severe aortic valve stenosis: new and requires workup  Risk of Complications, Morbidity, and/or Mortality  Presenting problems: high  Diagnostic procedures: high  Management options: high  General comments:  Total critical care time spend exclusive of procedures:  40 minutes. Procedures      EKG interpretation: 11:22  Rhythm: +artifact limits interpretation, likely sinus tachycardia with occ ectopy vs. Afib; and irregular. Rate (approx.): 108; Axis: normal; Intervals: MT interval 110 ms, normal QTc; ST/T wave: non-specific changes; EKG documented and interpreted by Kaleb Fox MD, ED MD.      Code Sepsis Reassessment & Plan    - Sepsis order set entered: YES  - Broad Spectrum Antibiotics given: Zosyn  - Repeat lactic acid ordered for time 13:40  - Re-assessment performed at time 12:34 PM   and clinical condition improving.    - Actions taken: supplemental O2 via NC.  - Hypotension or Lactic Acidosis present (SBP<90, MAP<65, Lactate >4): NO   - IVF: Limited IVF given (0) because patient has shortness of breath with minimal exertion  - Persistent Septic Shock present (Hypotension despite IVF resuscitation): NO  Vasopressors: Not indicated due to septic shock not present  - Disposition: Admit to Telemetry      Perfect Serve Consult for Admission  12:37 PM    ED Room Number: ER18/18  Patient Name and age: Max Orpha Nail 80 y.o.  male  Working Diagnosis:   1. Sepsis with acute hypoxic respiratory failure without septic shock, due to unspecified organism ([address]    2. Acute respiratory failure with hypoxia (HCC)    3. Severe aortic valve stenosis    4. Pneumonia of both lungs due to infectious organism, unspecified part of lung    5. Hyponatremia    6. Hyperglycemia        COVID-19 Suspicion:  yes  Sepsis present:  yes  Reassessment needed: yes  Code Status:  Full Code  Readmission: yes  Isolation Requirements:  no, pending rapid COVID test.  Recommended Level of Care:  telemetry  Department:[facility] ED - [phone]  Other:  Consulting cardiology, Zosyn and Levaquin initiated.

## 2022-02-03 NOTE — PROGRESS NOTES
CARE MANAGEMENT INITIAL ASSESSEMENT      NAME:   Glory Nevarez   :     1930   MRN:     [id number]       Emergency Contact:  Extended Emergency Contact Information  Primary Emergency Contact: Denise Lainez  Address: [address] [facility]           [address], [address] Phone: [phone]  Mobile Phone: [phone]  Relation: Spouse  Secondary Emergency Contact: Adriana Mcginnis  Mobile Phone: [phone]  Relation: Daughter    Advance Directive:  Full Code, has an advance directive. Copy on chart. .    Healthcare Decision Maker:    Primary Decision Maker: Geronimo Murrayer - [phone]    Secondary Decision Maker: Adriana Mcginnis - Daughter - [phone]     Reason for Admission:  Mr. Nickolas Hernandez is a 80 y.o. male with history that includes arthritis, DM, AFIB, CVA, HTN and NATHAN  who was emergently admitted for:  acute hypoxic respiratory failure. Patient Active Problem List   Diagnosis Code    Essential hypertension, benign I10    UC (ulcerative colitis) ([facility] K51.90    Hyperlipidemia LDL goal <100 E78.5    DJD (degenerative joint disease) M19.90    PMR (polymyalgia rheumatica) ([facility]) M35.3    Sleep apnea G47.30    Advanced care planning/counseling discussion Z70.80    CVA (cerebral vascular accident) ([facility] I63.9    Elevated troponin R77.8    SHAYE (acute kidney injury) ([facility] N17.9    Acute hypoxemic respiratory failure ([facility] J96.01       Assessment:  Via phone with spouse Diamante Parekh. RUR:  Not available  Risk Level:  Low  Value-based purchasing:   No  Bundle patient:  No    Residency:  Private residence  Exterior Steps:  3- Pt uses railings. Spouse reports Pt does have a hard time with steps. Interior Steps:  None    Lives With:  Spouse/Significant Other    Prior functioning:  Dependent.   Patient requires assistance with:  Bathing, Dressing, Ambulation and Continence    Prior DME required:  Rolling walker, Raised toilet seat and CPAP    DME available: Same as above    Rehab history:  None    Discharge Concerns/Barriers Identified:  None      Insurer:  Payor: Toshia Chanel / Plan: [employer] / Product Type: Managed Care Medicare /     PCP: Kellen Velázquez MD   Name of Practice:  [facility]   Current patient: Yes   Approximate date of last visit: 1/7/22   Access to virtual PCP visits:  Yes    Pharmacy:  [address]. Financial/Difficulty affording medications:  None identified    COVID-19 vaccination status:  Fully vaccinated in March. Pt received booster in November. DC Transport:  Family      Transition of care plan:  Home with outpatient follow-up     Comments:   Pt admitted on 2/3/22 for acute hypoxic respiratory failure. CM completed initial assessment with Pt's spouse due to isolation precautions. Pt lives at home with his spouse. Pt has no hx of HH or home O2. Pt has a walker, CPAP, and raised toilet seat. Spouse states that Pt had a recent CVA and since the CVA she has been helping with all ADLs. Pt uses a walker at all times with ambulation. Spouse states that Pt has been experiencing increasing SOB lately. Pt does not drive. Discharge plan is for Pt to return home. Pt states family will transport him home. Pt has PT/OT evals pending. CM will review recommendations when available.   _____________________________________  ROSA Still - Care Management  2/3/2022   4:01 PM      Care Management Interventions  PCP Verified by CM: Yes Colt Jackson MD)  Mode of Transport at Discharge:  Other (see comment) (family)  Transition of Care Consult (CM Consult): Discharge Planning  MyChart Signup: No  Discharge Durable Medical Equipment: No  Physical Therapy Consult: Yes  Occupational Therapy Consult: Yes  Speech Therapy Consult: No  Support Systems: Spouse/Significant Other  Confirm Follow Up Transport: Family  Discharge Location  Patient Expects to be Discharged to[de-identified] Home with outpatient services

## 2022-02-03 NOTE — PROGRESS NOTES
[facility] Dosing Services: Antimicrobial Stewardship Progress Note    Consult for antibiotic dosing of vancomycin by Dr. Komal Muniz  Pharmacist reviewed antibiotic appropriateness for 80y.o. year old , male  for indication of HAP  Day of Therapy 1    Allergies  Statins-hmg-coa reductase inhibitors    Plan:  Vancomycin therapy:  Loading dose of Vancomycin 2000 mg IV given  Maintenance dose: Vancomycin 750 mg IV every 24 hours   Dose calculated to approximate an AUC of 435  Target AUC/UDAY of 400-600  Plan:  Pharmacy to follow daily and will make changes to dose and/or frequency based on clinical status. PK levels  No results found for: VANCT       Date Dose & Interval Measured (mcg/mL) Extrapolated (mcg/mL)   ? ? ? ?   ? ? ? ?   ? ? ? ? Non-Kinetic Antimicrobial Dosing:   Current Regimen:   Current Antimicrobial Therapy (168h ago, onward)       Ordered     Start Stop    02/03/22 1232  piperacillin-tazobactam (ZOSYN) 3.375 g in 0.9% sodium chloride (MBP/ADV) 100 mL MBP  3.375 g,   IntraVENous,   EVERY 8 HOURS        Note to Pharmacy: 4 hour extended infusion time.    References:    Lexicomp    02/03/22 1900 --    02/03/22 1408  vancomycin (VANCOCIN) 2000 mg in  ml infusion  2,000 mg,   IntraVENous,   ONCE        References:    Lexicomp    02/03/22 1409 02/04/22 6671                    Recommendation:   Cultures     All Micro Results       Procedure Component Value Units Date/Time    CULTURE, BLOOD [[id number]] Collected: 02/03/22 1133    Order Status: Completed Specimen: Blood Updated: 02/03/22 1340    RESPIRATORY VIRUS PANEL W/COVID-19, PCR [[id number]]     Order Status: Sent Specimen: NASOPHARYNGEAL SWAB     CULTURE, MRSA [[id number]]     Order Status: Sent Specimen: Nasal from [place]ares     COVID-19 RAPID TEST [[id number]] Collected: 02/03/22 1209    Order Status: Completed Specimen: Nasopharyngeal Updated: 02/03/22 1247     Specimen source Nasopharyngeal        COVID-19 rapid test Not detected        Comment: Rapid Abbott ID Now       Rapid NAAT:  The specimen is NEGATIVE for SARS-CoV-2, the novel coronavirus associated with COVID-19. Negative results should be treated as presumptive and, if inconsistent with clinical signs and symptoms or necessary for patient management, should be tested with an alternative molecular assay. Negative results do not preclude SARS-CoV-2 infection and should not be used as the sole basis for patient management decisions. This test has been authorized by the FDA under an Emergency Use Authorization (EUA) for use by authorized laboratories. Fact sheet for Healthcare Providers: [employer]  Fact sheet for Patients: [employer]       Methodology: Isothermal Nucleic Acid Amplification         CULTURE, URINE [[id number]] Collected: 02/03/22 1230    Order Status: Completed Specimen: Urine from Clean catch Updated: 02/03/22 1234           Serum Creatinine     Lab Results   Component Value Date/Time    Creatinine 1.54 (H) 02/03/2022 11:29 AM    Creatinine (POC) 0.9 10/12/2009 11:00 AM       Creatinine Clearance Estimated Creatinine Clearance: 32.7 mL/min (A) (based on SCr of 1.54 mg/dL (H)).      Temp   97.3 °F (36.3 °C)    WBC   Lab Results   Component Value Date/Time    WBC 13.7 (H) 02/03/2022 11:29 AM       H/H   Lab Results   Component Value Date/Time    HGB 9.6 (L) 02/03/2022 11:29 AM        Platelets   Lab Results   Component Value Date/Time    PLATELET 714 (H) 02/14/1998 11:29 AM          Pharmacist: Gm Jackson PHARMD Contact information: [phone]

## 2022-02-03 NOTE — H&P
Hospitalist Admission Note    NAME: Ramila De Luna   :  1930   MRN:  [id number]     Date/Time:  2/3/2022 1:41 PM    Patient PCP: Meg Kay MD  ________________________________________________________________________    Given the patient's current clinical presentation, I have a high level of concern for decompensation if discharged from the emergency department. Complex decision making was performed, which includes reviewing the patient's available past medical records, laboratory results, and x-ray films. My assessment of this patient's clinical condition and my plan of care is as follows. Assessment / Plan:    #AHRF: Favor pulmonary edema given severe/critical AS and fact that pt has not taken furosemide since discharge. Exam suggestive of this; BNP > 20,000 (was 2,628 one month ago). However, he does have new leukocytosis and equivocal CXR so do feel compelled to empirically treat HAP. Rapid SARS negative (vaccinated and boosted), but will also obtain PCR   - S/p furosemide 10mg IV in ER, fairly low dose so will re-dose 40mg now   - Tx possible HAP    - Check procal   - Further eval SARS w/ PCR    #Severe/Critical AS: peak velocity 4.1 m/s, area 0.7cm2 on echo . Presently in AF w/ rate > 110 and mildly hypertensive and likely worsening his pulmonary edema. Followed closely by Elizabeth Morgan and was to establish with Maureen Marquez next week. May need transfer for more urgent TAVR. Despite his age, he does seem to be fairly functional   - Diurese   - Cards eval +/- repeat echo +/- RHC/LHC +/- tx for TAVR   - Control HR, BP    #? Sepsis? / HAP: Leukocytosis, tachycardia and possible pulmonary infiltrates. Afebrile. S/p levo, pip/tazo in ER.  Don't think he needs double coverage, but does need MRSA coverage, though my c/f true HAP is low   - Vanc, pip/tazo 2/3 -     - F/u MRSA nare     - F/u procal   - F/u BCx    - Diuresing as he is volume up in s/o above; aggressive fluid resuscitation is contraindicated     #Elevated troponin: No chest pain, likely demand in s/o above. Will be getting LHC soon regardless    #? Afib: vs frequent PVCs: Not on AC due to DAPT? Not clear to me if this is new or even legitimate     #Hyponatremia: Favor hypervolemic   - Diurese    #CKD3: Near baseline    #DM2: Metformin as outpt   - Basal + SSI inpt    #PMR: On steroids chronically, which may contribute to volume overload    #UC: In remission, on sulfasalazine    #HTN: Benaz, carvedilol as outpt    #NATHAN: CPAP          I have personally reviewed the radiographs, laboratory data in Epic and decisions and statements above are based partially on this personal interpretation. Code Status: Full Code  DVT Prophylaxis: Lovenox  GI Prophylaxis: not indicated       Subjective:   CHIEF COMPLAINT: \"*dyspnea\"    HISTORY OF PRESENT ILLNESS:     Max is a 80 y.o. M hx DM, HTN, CKD, and severe AS (peak velocity 4.1 m/s, area 0.7cm2), who was admitted several weeks ago for CVA presents with progressive shortness of breath. He had been changed to prn furosemide at that CT but says he did not  the prescription and has not been on a diuretic at all. He has followed closely with Dr. Ovidio Garcia and was last seen 1/31 (3 days ago). Plan at that time was to pursue [facility] for prep for TAVR w/ Dr. Rosa Isela Perez, which was to be next week. However, he has had worsening sx since that time. Family called Dr. Rosa Isela Perez today who recommended presentation to ER with potential for transfer to [facility] for more urgent TAVR. In ER, he is noted to be in mild respiratory distress, but only requiring 2L nc. CXR somewhat equivocal for bilateral infiltrates vs pulmonary edema per my read. WBC slightly elevated (on pred). Afib noted. He received pip/tazo and levofloxacin following BCx, as well as furosemide 10mg IV.        Past Medical History:   Diagnosis Date    Arrhythmia     a fib on occasion    Arthritis     Autoimmune disease ([facility] [address]     Questionable Fibromyalgia    Cancer ([facility] [address]     basal cell on nose, ear and cheek    Chronic pain     Diabetes ([facility] [address]     Hypertension     Other and unspecified hyperlipidemia 4/7/2013    Other ill-defined conditions(799.89)     bilateral glaucoma    Other ill-defined conditions(799.89)     UC    Sleep apnea     Stool color black     Stroke [facility] 11/2010 4/13, 6/15  TIA    UC (ulcerative colitis) ([facility] [address] 10/14/2009    Unspecified sleep apnea     on bipap      Past Surgical History:   Procedure Laterality Date    HX COLONOSCOPY  12/2014    HX ORTHOPAEDIC      left knee arthroscopy     Social History     Tobacco Use    Smoking status: Never Smoker    Smokeless tobacco: Never Used   Substance Use Topics    Alcohol use: No      Family History   Problem Relation Age of Onset    Stroke Mother     Dementia Mother     Cancer Father         pancreatic    Stroke Brother     Cancer Brother     Stroke Brother         Allergies   Allergen Reactions    Statins-Hmg-Coa Reductase Inhibitors Myalgia        Prior to Admission medications    Medication Sig Start Date End Date Taking? Authorizing Provider   pravastatin (PRAVACHOL) 10 mg tablet Take 1 Tablet by mouth nightly. 1/31/22   Craig Sullivan MD   sodium bicarbonate 650 mg tablet Take  by mouth four (4) times daily. Provider, Historical   bimatoprost (LUMIGAN) 0.03 % ophthalmic drops 1 gtt in each eye    Provider, Historical   timoloL (BETIMOL) 0.25 % ophthalmic solution Administer 1-2 Drops to both eyes two (2) times a day. use in affected eye(s)    Provider, Historical   amLODIPine (NORVASC) 5 mg tablet Take 1 Tablet by mouth daily. 1/7/22   Craig Sullivan MD   aspirin 81 mg chewable tablet Take 1 Tablet by mouth daily. 1/1/22   Quang Galo MD   furosemide (LASIX) 20 mg tablet Take 1 Tablet by mouth two (2) times daily as needed (Lower extremity swelling).   Patient not taking: Reported on 1/31/2022 12/31/21   Quang Galo MD acetaminophen (Tylenol Arthritis Pain) 650 mg TbER Take 650 mg by mouth two (2) times daily as needed for Pain. Provider, Historical   cholecalciferol (Vitamin D3) (1000 Units /25 mcg) tablet Take 1,000 Units by mouth daily. Provider, Historical   metFORMIN ER (GLUCOPHAGE XR) 500 mg tablet Take 500 mg by mouth two (2) times a day. Provider, Historical   folic acid (FOLVITE) 1 mg tablet TAKE 1 TABLET DAILY 12/4/21   Tammy Sullivan MD   clopidogreL (PLAVIX) 75 mg tab TAKE 1 TABLET DAILY 11/17/21   Tammy Sullivan MD   predniSONE (DELTASONE) 1 mg tablet Take 1 Tablet by mouth daily (with breakfast). 10/19/21   Mike Olivera MD   vit A,C,E-zinc-copper (PreserVision AREDS) cap capsule Take 1 Capsule by mouth two (2) times a day. Provider, Historical   sulfaSALAzine (AZULFIDINE) 500 mg tablet Take 2 Tablets by mouth two (2) times a day. 8/4/21   Mike Olivera MD   turmeric-herbal complex no. 278 150 mg cap Take 1,000 mg by mouth. Provider, Historical   ezetimibe (ZETIA) 10 mg tablet Take 10 mg by mouth daily. 10/1/20   Provider, Historical   COREG CR 10 mg CR capsule Take 10 mg by mouth daily. 8/17/16   Provider, Historical   cyanocobalamin (VITAMIN B-12) 1,000 mcg tablet Take 1,000 mcg by mouth daily. Provider, Historical   omega-3 fatty acids-vitamin e (FISH OIL) 1,000 mg Cap Take 1 Capsule by mouth two (2) times a day. Provider, Historical   benazepril (LOTENSIN) 40 mg tablet Take 40 mg by mouth daily. Provider, Historical   bimatoprost (LUMIGAN) 0.03 % ophthalmic drops Administer 1 Drop to both eyes every evening.     Provider, Historical     REVIEW OF SYSTEMS:  See HPI for details  General: negative for fever, chills, sweats, ++weakness, weight loss  Eyes: negative for blurred vision, eye pain, loss of vision, diplopia  Ear Nose and Throat: negative for rhinorrhea, pharyngitis, otalgia, tinnitus, speech or swallowing difficulties  Respiratory: -pleuritic pain, cough, sputum production, wheezing, +SOB, +FOURNIER  Cardiology:  negative for chest pain, palpitations, orthopnea, PND, edema, syncope   Gastrointestinal: negative for abdominal pain, N/V, dysphagia, change in bowel habits, bleeding  Genitourinary: negative for frequency, urgency, dysuria, hematuria, incontinence  Muskuloskeletal : negative for arthralgia, myalgia  Hematology: negative for easy bruising, bleeding, lymphadenopathy  Dermatological: negative for rash, ulceration, mole change, new lesion  Endocrine: negative for hot flashes or polydipsia  Neurological: negative for headache, dizziness, confusion, focal weakness, paresthesia, memory loss, gait disturbance  Psychological: negative for anxiety, depression, agitation      Objective:   VITALS:    Visit Vitals  BP (!) 151/97   Pulse (!) 106   Temp 97.3 °F (36.3 °C)   Resp (!) 31   Ht 5' 8\" (1.727 m)   Wt 82.1 kg (181 lb)   SpO2 93%   BMI 27.52 kg/m²     PHYSICAL EXAM:    Physical Exam:    Gen: Well-developed, well-nourished, in mild acute distress  HEENT:  Pink conjunctivae, PERRL, hearing intact to voice, moist mucous membranes  Neck: Supple, without masses, thyroid non-tender  Resp: No accessory muscle use, diffuse rales  Card: No murmurs, normal S1, S2 without thrills, bruits or peripheral edema  Abd:  Soft, non-tender, non-distended, normoactive bowel sounds are present, no palpable organomegaly and no detectable hernias  Lymph:  No cervical or inguinal adenopathy  Musc: No cyanosis or clubbing  Skin: No rashes or ulcers, skin turgor is good  Neuro:  Cranial nerves are grossly intact, no focal motor weakness, follows commands appropriately  Psych:  Good insight, oriented to person, place and time, alert          _______________________________________________________________________  Care Plan discussed with:    Comments   Patient x Discussed with patient in room.  POC outlined and Questions answered    Family  x    RN x    Care Manager Consultant:  willian GUTIERREZ MD   _______________________________________________________________________  Recommended Disposition:   Home with Family x   HH/PT/OT/RN    SNF/LTC    LEONIE    ________________________________________________________________________  TOTAL TIME:  60 Minutes        Comments   >50% of visit spent in counseling and coordination of care  Chart reviewed  Discussion with patient and/or family and questions answered     ________________________________________________________________________  Signed: Tati Ortiz MD    This note will not be viewable in [address] Procedures: see electronic medical records for all procedures/Xrays and details which were not copied into this note but were reviewed prior to creation of Plan.     LAB DATA REVIEWED:    Recent Results (from the past 24 hour(s))   EKG, 12 LEAD, INITIAL    Collection Time: 02/03/22 11:22 AM   Result Value Ref Range    Ventricular Rate 108 BPM    Atrial Rate 108 BPM    P-R Interval 110 ms    QRS Duration 74 ms    Q-T Interval 332 ms    QTC Calculation (Bezet) 444 ms    Calculated P Axis 6 degrees    Calculated R Axis 3 degrees    Calculated T Axis -8 degrees    Diagnosis       ** Poor data quality, interpretation may be adversely affected  Probable Sinus tachycardia with short FL with premature supraventricular   complexes and with occasional premature ventricular complexes  Abnormal ECG  When compared with ECG of 06-JAN-2022 19:13,  premature ventricular complexes are now present  premature supraventricular complexes are now present  QT has lengthened  Confirmed by Jeanine Waters MD, [id number] ([id number]) on 2/3/2022 12:18:44 PM     TROPONIN-HIGH SENSITIVITY    Collection Time: 02/03/22 11:29 AM   Result Value Ref Range    Troponin-High Sensitivity 948 (HH) 0 - 76 ng/L   NT-PRO BNP    Collection Time: 02/03/22 11:29 AM   Result Value Ref Range    NT pro-BNP 20,128 (H) <450 PG/ML   CBC WITH AUTOMATED DIFF    Collection Time: 02/03/22 11:29 AM   Result Value Ref Range    WBC 13.7 (H) 4.1 - 11.1 K/uL    RBC 2.95 (L) 4.10 - 5.70 M/uL    HGB 9.6 (L) 12.1 - 17.0 g/dL    HCT 29.2 (L) 36.6 - 50.3 %    MCV 99.0 80.0 - 99.0 FL    MCH 32.5 26.0 - 34.0 PG    MCHC 32.9 30.0 - 36.5 g/dL    RDW 12.8 11.5 - 14.5 %    PLATELET 265 (H) 838 - 400 K/uL    MPV 9.7 8.9 - 12.9 FL    NRBC 0.0 0  WBC    ABSOLUTE NRBC 0.00 0.00 - 0.01 K/uL    NEUTROPHILS 87 (H) 32 - 75 %    LYMPHOCYTES 6 (L) 12 - 49 %    MONOCYTES 5 5 - 13 %    EOSINOPHILS 0 0 - 7 %    BASOPHILS 1 0 - 1 %    IMMATURE GRANULOCYTES 1 (H) 0.0 - 0.5 %    ABS. NEUTROPHILS 11.9 (H) 1.8 - 8.0 K/UL    ABS. LYMPHOCYTES 0.9 0.8 - 3.5 K/UL    ABS. MONOCYTES 0.7 0.0 - 1.0 K/UL    ABS. EOSINOPHILS 0.0 0.0 - 0.4 K/UL    ABS. BASOPHILS 0.1 0.0 - 0.1 K/UL    ABS. IMM. GRANS. 0.1 (H) 0.00 - 0.04 K/UL    DF AUTOMATED     METABOLIC PANEL, COMPREHENSIVE    Collection Time: 02/03/22 11:29 AM   Result Value Ref Range    Sodium 127 (L) 136 - 145 mmol/L    Potassium 5.1 3.5 - 5.1 mmol/L    Chloride 94 (L) 97 - 108 mmol/L    CO2 23 21 - 32 mmol/L    Anion gap 10 5 - 15 mmol/L    Glucose 306 (H) 65 - 100 mg/dL    BUN 31 (H) 6 - 20 MG/DL    Creatinine 1.54 (H) 0.70 - 1.30 MG/DL    BUN/Creatinine ratio 20 12 - 20      GFR est AA 52 (L) >60 ml/min/1.73m2    GFR est non-AA 43 (L) >60 ml/min/1.73m2    Calcium 9.2 8.5 - 10.1 MG/DL    Bilirubin, total 0.9 0.2 - 1.0 MG/DL    ALT (SGPT) 8 (L) 12 - 78 U/L    AST (SGOT) 16 15 - 37 U/L    Alk. phosphatase 73 45 - 117 U/L    Protein, total 7.0 6.4 - 8.2 g/dL    Albumin 2.9 (L) 3.5 - 5.0 g/dL    Globulin 4.1 (H) 2.0 - 4.0 g/dL    A-G Ratio 0.7 (L) 1.1 - 2.2     SAMPLES BEING HELD    Collection Time: 02/03/22 11:29 AM   Result Value Ref Range    SAMPLES BEING HELD KATHI.RED. PST     COMMENT        Add-on orders for these samples will be processed based on acceptable specimen integrity and analyte stability, which may vary by analyte.    POC LACTIC ACID    Collection Time: 02/03/22 11:40 AM   Result Value Ref Range Lactic Acid (POC) 2.18 (HH) 0.40 - 2.00 mmol/L   COVID-19 RAPID TEST    Collection Time: 02/03/22 12:09 PM   Result Value Ref Range    Specimen source Nasopharyngeal      COVID-19 rapid test Not detected NOTD     URINALYSIS W/ RFLX MICROSCOPIC    Collection Time: 02/03/22 12:30 PM   Result Value Ref Range    Color YELLOW/STRAW      Appearance CLOUDY (A) CLEAR      Specific gravity 1.028 1.003 - 1.030      pH (UA) 5.5 5.0 - 8.0      Protein 100 (A) NEG mg/dL    Glucose 500 (A) NEG mg/dL    Ketone 40 (A) NEG mg/dL    Bilirubin Negative NEG      Blood Negative NEG      Urobilinogen 0.2 0.2 - 1.0 EU/dL    Nitrites Negative NEG      Leukocyte Esterase Negative NEG      WBC 0-4 0 - 4 /hpf    RBC 0-5 0 - 5 /hpf    Epithelial cells FEW FEW /lpf    Bacteria Negative NEG /hpf    Hyaline cast 2-5 0 - 5 /lpf    Granular cast 10-20 (A) NEG /lpf

## 2022-02-04 PROBLEM — I50.33 DIASTOLIC CHF, ACUTE ON CHRONIC (HCC): Status: ACTIVE | Noted: 2022-01-01

## 2022-02-04 PROBLEM — J18.9 PNEUMONIA: Status: ACTIVE | Noted: 2022-01-01

## 2022-02-04 PROBLEM — I35.0 SEVERE AORTIC STENOSIS: Status: ACTIVE | Noted: 2022-01-01

## 2022-02-04 NOTE — PROGRESS NOTES
1215: Informed Dr. Renetta Armstrong patient is going home on hospice. CM asked for clarification of abx. Dr. Renetta Armstrong will place order for PO abx.

## 2022-02-04 NOTE — CONSULTS
Palliative Medicine Consult  Alfred: [phone] ([phone])    Patient Name: Carine Perrin  YOB: 1930    Date of Initial Consult: 2/4/2022  Reason for Consult: Care discussions  Requesting Provider: Dr. Nicol Barajas  Primary Care Physician: Marius Merlin, MD     SUMMARY:   Carine Perrin is a 80 y.o. with a past history of DM 2, CKD 3, HTN, severe aortic stenosis, A. fib, NSTEMI, CVA/TIA (2013, 2015), ulcerative colitis, basal cell carcinoma of skin, [place] tooth syndrome, bilateral glaucoma who was admitted on 2/3/2022 from home with a diagnosis of acute hypoxic respiratory failure with multifactorial etiology including severe/critical aortic stenosis, CHF and possible HAP. Patient presented to the ER with CC of chronic intermittent shortness of breath that became progressively worse today, as well as increased weakness and poor appetite. Current medical issues leading to Palliative Medicine involvement include: Critical aortic stenosis, worsening debility and declining appetite        Baseline cognitive and functional status: Alert and oriented, poor appetite, increased weakness and need for assistance with getting dressed, bathing. Walking with walker. No longer able to navigate stairs in home. PALLIATIVE DIAGNOSES:   1. Palliative care encounter  2. Concerned about end-of-life  3. Hypoxia  4. Weakness, generalized  5. Advance care planning discussion  6. DNR discussion  7. Goals of care discussion       PLAN:   1. Prior to visit chart reviewed, discussed with cardiology NP Radha Lopes and attending Dr. Nicol Barajas.  2. I met with patient, his wife is at bedside. Introduced myself and role of Palliative Medicine. 3. Patient and his wife have good insight regarding patient's health, she shared that after they spoke with cardiology, they have been weighing the risks versus benefits of moving forward with cardiac cath/TAVR, and they no longer think the risks will outweigh the benefits.   4. Goals of care- and Mrs. Chaparrita García have decided that they do not want him to undergo cardiac cath and TAVR, they do not think the risks will outweigh benefits. They are interested in going home with hospice. · I placed order for case management to consult hospice. Unit  Lupe Gregory is aware of patient's goal to return home with hospice. · DME needed include; oxygen concentrator, hospital bed, over bed table, bedside commode, shower chair, wheelchair  5. DNR discussion-discussed benefits versus burdens of CPR in setting of critical AS and goals for comfort, I recommended DNR. Patient and his wife agreed to DNR, order placed in EMR., I also notified patient's nurse Neva RN and unit  Trixie  6. Advance care planning discussion-patient has an AMD, a copy is in the EMR. Patient had designated his wife Freddy Nettles to serve as primary healthcare decision-maker and his daughter Aneta Galeazzi to serve as secondary. 7. Initial consult note routed to primary continuity provider and/or primary health care team members  8.  Communicated plan of care with: [id number] Naresh Tineo, unit  Laurie, nurse Kelli Boone RN and cardiology NP Rhonda Acosta / TREATMENT PREFERENCES:     GOALS OF CARE: Comfort care at home with hospice       TREATMENT PREFERENCES:   Code Status: DNR    Patient and family's personal goals include: Being at home with family, comfortable    Important upcoming milestones or family events: Unknown    The patient identifies the following as important for living well: Unknown      Advance Care Planning:  [x] The [facility] Interdisciplinary Team has updated the ACP Navigator with Health Care Decision Maker and Patient Capacity      Primary Decision Maker: Socorro Phipps - [phone]    Secondary Decision Maker: Maria Antonia Velasquez - [place] - Kristy [facility] Planning 2/4/2022   Patient's Healthcare Decision Maker is: Named in scanned ACP document   Confirm Advance Directive Yes, on file   Patient Would Like to Complete Advance Directive -               Other:    As far as possible, the palliative care team has discussed with patient / health care proxy about goals of care / treatment preferences for patient. HISTORY:     History obtained from: Medical records/patient/family/attending and cardiology NP    CHIEF COMPLAINT: Mouth is dry    HPI/SUBJECTIVE:    The patient is:   [x] Verbal and participatory  [] Non-participatory due to:   Patient stated that his mouth is dry needs to drink water. Denied pain, stated shortness of breath improved, fair appetite/intake    Clinical Pain Assessment (nonverbal scale for severity on nonverbal patients):              Duration: for how long has pt been experiencing pain (e.g., 2 days, 1 month, years)  Frequency: how often pain is an issue (e.g., several times per day, once every few days, constant)     FUNCTIONAL ASSESSMENT:     Palliative Performance Scale (PPS): 40          PSYCHOSOCIAL/SPIRITUAL SCREENING:     Palliative IDT has assessed this patient for cultural preferences / practices and a referral made as appropriate to needs (Cultural Services, Patient Advocacy, Ethics, etc.)    Any spiritual / Methodist concerns:  [] Yes /  [x] No   If \"Yes\" to discuss with pastoral care during IDT     Does caregiver feel burdened by caring for their loved one:   [] Yes /  [x] No /  [] No Caregiver Present    If \"Yes\" to discuss with social work during IDT    Anticipatory grief assessment:   [x] Normal  / [] Maladaptive     If \"Maladaptive\" to discuss with social work during IDT    ESAS Anxiety:      ESAS Depression:          REVIEW OF SYSTEMS:     Positive and pertinent negative findings in ROS are noted above in HPI. The following systems were [x] reviewed / [] unable to be reviewed as noted in HPI  Other findings are noted below.   Systems: constitutional, ears/nose/mouth/throat, respiratory, gastrointestinal, genitourinary, musculoskeletal, integumentary, neurologic, psychiatric, endocrine. Positive findings noted below. Modified ESAS Completed by: provider                                            PHYSICAL EXAM:     From RN flowsheet:  Wt Readings from Last 3 Encounters:   02/04/22 181 lb (82.1 kg)   01/31/22 181 lb (82.1 kg)   01/14/22 188 lb (85.3 kg)     Blood pressure 127/78, pulse (!) 113, temperature 98.8 °F (37.1 °C), resp. rate 29, height 5' 8\" (1.727 m), weight 181 lb (82.1 kg), SpO2 95 %.     Pain Scale 1: Numeric (0 - 10)  Pain Intensity 1: 0                 Last bowel movement, if known:     Constitutional: Appears younger than stated age, adequately nourished, pleasant, NAD  Eyes: pupils equal, anicteric  ENMT: no nasal discharge, dry mucous membranes  Cardiovascular: regular rhythm, distal pulses intact  Respiratory: breathing not labored, symmetric  Gastrointestinal: soft non-tender, +bowel sounds  Musculoskeletal: no deformity, no tenderness to palpation  Skin: warm, dry  Neurologic: following commands, moving all extremities  Psychiatric: full affect, no hallucinations  Other:       HISTORY:     Active Problems:    Acute hypoxemic respiratory failure (HCC) (2/3/2022)      Past Medical History:   Diagnosis Date    Arrhythmia     a fib on occasion    Arthritis     Autoimmune disease ([address]     Questionable Fibromyalgia    Cancer ([address]     basal cell on nose, ear and cheek    Chronic pain     Diabetes ([address]     Hypertension     Other and unspecified hyperlipidemia 4/7/2013    Other ill-defined conditions(799.89)     bilateral glaucoma    Other ill-defined conditions(799.89)     UC    Sleep apnea     Stool color black     Stroke ([address] 11/2010 4/13, 6/15  TIA    UC (ulcerative colitis) ([address] 10/14/2009    Unspecified sleep apnea     on bipap      Past Surgical History:   Procedure Laterality Date    HX COLONOSCOPY  12/2014    HX ORTHOPAEDIC      left knee arthroscopy Family History   Problem Relation Age of Onset    Stroke Mother     Dementia Mother     Cancer Father         pancreatic    Stroke Brother     Cancer Brother     Stroke Brother       History reviewed, no pertinent family history.   Social History     Tobacco Use    Smoking status: Never Smoker    Smokeless tobacco: Never Used   Substance Use Topics    Alcohol use: No     Allergies   Allergen Reactions    Statins-Hmg-Coa Reductase Inhibitors Myalgia      Current Facility-Administered Medications   Medication Dose Route Frequency    bumetanide (BUMEX) injection 1 mg  1 mg IntraVENous Q12H    heparin (porcine) injection 5,000 Units  5,000 Units SubCUTAneous Q8H    doxycycline (VIBRAMYCIN) 100 mg in 0.9% sodium chloride (MBP/ADV) 100 mL MBP  100 mg IntraVENous Q12H    cefTRIAXone (ROCEPHIN) 2 g in 0.9% sodium chloride 20 mL IV syringe  2 g IntraVENous Q24H    amLODIPine (NORVASC) tablet 5 mg  5 mg Oral DAILY    aspirin chewable tablet 81 mg  81 mg Oral DAILY    latanoprost (XALATAN) 0.005 % ophthalmic solution 1 Drop  1 Drop Both Eyes QHS    clopidogreL (PLAVIX) tablet 75 mg  75 mg Oral DAILY    ezetimibe (ZETIA) tablet 10 mg  10 mg Oral DAILY    pravastatin (PRAVACHOL) tablet 10 mg  10 mg Oral QHS    predniSONE (DELTASONE) tablet 1 mg  1 mg Oral DAILY WITH BREAKFAST    sodium bicarbonate tablet 650 mg  650 mg Oral QID    sulfaSALAzine (AZULFIDINE) tablet 1,000 mg  1,000 mg Oral BID    timolol (TIMOPTIC) 0.25% ophthalmic solution  1 Drop Both Eyes BID    carvediloL (COREG) tablet 12.5 mg  12.5 mg Oral BID WITH MEALS    sodium chloride (NS) flush 5-10 mL  5-10 mL IntraVENous PRN    sodium chloride (NS) flush 5-40 mL  5-40 mL IntraVENous Q8H    sodium chloride (NS) flush 5-40 mL  5-40 mL IntraVENous PRN    acetaminophen (TYLENOL) tablet 650 mg  650 mg Oral Q6H PRN    Or    acetaminophen (TYLENOL) suppository 650 mg  650 mg Rectal Q6H PRN    polyethylene glycol (MIRALAX) packet 17 g  17 g Oral DAILY PRN    ondansetron (ZOFRAN ODT) tablet 4 mg  4 mg Oral Q8H PRN    Or    ondansetron (ZOFRAN) injection 4 mg  4 mg IntraVENous Q6H PRN    glucose chewable tablet 16 g  4 Tablet Oral PRN    dextrose (D50W) injection syrg 12.5-25 g  25-50 mL IntraVENous PRN    glucagon (GLUCAGEN) injection 1 mg  1 mg IntraMUSCular PRN    insulin glargine (LANTUS) injection 16 Units  0.2 Units/kg SubCUTAneous DAILY    insulin lispro (HUMALOG) injection   SubCUTAneous AC&HS     Current Outpatient Medications   Medication Sig    pravastatin (PRAVACHOL) 10 mg tablet Take 1 Tablet by mouth nightly.  sodium bicarbonate 650 mg tablet Take  by mouth four (4) times daily.  bimatoprost (LUMIGAN) 0.03 % ophthalmic drops 1 gtt in each eye    timoloL (BETIMOL) 0.25 % ophthalmic solution Administer 1-2 Drops to both eyes two (2) times a day. use in affected eye(s)    amLODIPine (NORVASC) 5 mg tablet Take 1 Tablet by mouth daily.  aspirin 81 mg chewable tablet Take 1 Tablet by mouth daily.  furosemide (LASIX) 20 mg tablet Take 1 Tablet by mouth two (2) times daily as needed (Lower extremity swelling). (Patient not taking: Reported on 1/31/2022)    acetaminophen (Tylenol Arthritis Pain) 650 mg TbER Take 650 mg by mouth two (2) times daily as needed for Pain.  cholecalciferol (Vitamin D3) (1000 Units /25 mcg) tablet Take 1,000 Units by mouth daily.  metFORMIN ER (GLUCOPHAGE XR) 500 mg tablet Take 500 mg by mouth two (2) times a day.  folic acid (FOLVITE) 1 mg tablet TAKE 1 TABLET DAILY    clopidogreL (PLAVIX) 75 mg tab TAKE 1 TABLET DAILY    predniSONE (DELTASONE) 1 mg tablet Take 1 Tablet by mouth daily (with breakfast).  vit A,C,E-zinc-copper (PreserVision AREDS) cap capsule Take 1 Capsule by mouth two (2) times a day.  sulfaSALAzine (AZULFIDINE) 500 mg tablet Take 2 Tablets by mouth two (2) times a day.  turmeric-herbal complex no. 278 150 mg cap Take 1,000 mg by mouth.     ezetimibe (ZETIA) 10 mg tablet Take 10 mg by mouth daily.  COREG CR 10 mg CR capsule Take 10 mg by mouth daily.  cyanocobalamin (VITAMIN B-12) 1,000 mcg tablet Take 1,000 mcg by mouth daily.  omega-3 fatty acids-vitamin e (FISH OIL) 1,000 mg Cap Take 1 Capsule by mouth two (2) times a day.  benazepril (LOTENSIN) 40 mg tablet Take 40 mg by mouth daily.  bimatoprost (LUMIGAN) 0.03 % ophthalmic drops Administer 1 Drop to both eyes every evening. LAB AND IMAGING FINDINGS:     Lab Results   Component Value Date/Time    WBC 16.4 (H) 02/04/2022 02:06 AM    HGB 9.0 (L) 02/04/2022 02:06 AM    PLATELET 035 (H) 55/05/5056 02:06 AM     Lab Results   Component Value Date/Time    Sodium 131 (L) 02/04/2022 02:06 AM    Potassium 4.2 02/04/2022 02:06 AM    Chloride 97 02/04/2022 02:06 AM    CO2 24 02/04/2022 02:06 AM    BUN 34 (H) 02/04/2022 02:06 AM    Creatinine 1.41 (H) 02/04/2022 02:06 AM    Calcium 9.1 02/04/2022 02:06 AM    Magnesium 1.8 02/04/2022 02:06 AM    Phosphorus 5.1 (H) 02/04/2022 02:06 AM      Lab Results   Component Value Date/Time    Alk. phosphatase 73 02/03/2022 11:29 AM    Protein, total 7.0 02/03/2022 11:29 AM    Albumin 2.9 (L) 02/03/2022 11:29 AM    Globulin 4.1 (H) 02/03/2022 11:29 AM     Lab Results   Component Value Date/Time    INR 1.0 12/30/2021 04:38 PM    Prothrombin time 10.3 12/30/2021 04:38 PM    aPTT 24.5 12/30/2021 04:38 PM      No results found for: IRON, FE, TIBC, IBCT, PSAT, FERR   No results found for: PH, PCO2, PO2  No components found for: GLPOC   No results found for: CPK, CKMB             Total time: 70 min  Counseling / coordination time, spent as noted above: 50 minutes  > 50% counseling / coordination?:  Yes    Prolonged service was provided for  []30 min   []75 min in face to face time in the presence of the patient, spent as noted above. Time Start:   Time End:   Note: this can only be billed with 19048 (initial) or 33022 (follow up).   If multiple start / stop times, list each separately.

## 2022-02-04 NOTE — PROGRESS NOTES
CARDIOLOGY PROGRESS NOTE        [address], [place], [address] [facility]  Phone [phone]; Fax [phone]          2022 8:13 AM       Admit Date:           2/3/2022  Admit Diagnosis:  Acute hypoxemic respiratory failure ([address] [J96.01]  :          1930   MRN:          [id number]        Assessment/Plan  1. Acute HFpEF: EF 55-60% in Dec. pBNP rising, cont IV diuretics. Likely will not repeat TTE. Switched to coreg. Palliative care consult today to discuss care goals      2. Severe AS: was scheduled for cath  for TAVR workup, high risk for TAVR. May need to consider transfer to [facility] pending Palliative discussion     3. Elevated troponin: elevated previous admissions, could be d/t demand ischemia/AS, needed LHC/RHC prior to TAVR. Cont ASA/statin, plavix (on initially for CVA), BB      4. Acute hypoxic resp failure: on NC, started on abx per primary team     5. PAF: mentioned in medical hx but pt denies any knowledge of this, a-fib on tele. Switched to coreg, need to consider AC     6. CKD stage III: Cr 1.41, trend     7. Hx of CVA: ASA/plavix/statin therapy      8. HTN: cont coreg, norvasc      9. HLD: cont statin - intolerant of higher doses, on Zetia PTA     10. Hx of UC/arthritis: on chronic low dose steroids          Pt personally seen and examined. Chart reviewed. Agree with advanced NP's history, exam and  A/P with changes/additons. Blood pressure (!) 142/95, pulse (!) 102, temperature 98.8 °F (37.1 °C), resp. rate 25, height 5' 8\" (1.727 m), weight 181 lb (82.1 kg), SpO2 92 %. CVS-S1-S2 present, no murmur    2/6 systolic murmur present      A/P -  Acute on chronic HFpEF  - GDMT  Uptitrate meds as tolerated; Dyspnea improved; Switch to PO diurectics in AM  Severe aortic stenosis    Had a detailed discussion with patient/wife about proceeding with LHC/RHC; TAVR; transferred to [facility].   They would like to talk to palliative care first before making decisions. Add: Palliative care discussed with patient/wife. They have decided to go with home hospice. Arrangements are being made. We will see prn. Plz call if needed        Discussed with patient/nursing/family    Mandi Rey MD, [facility]    We discussed the expected course, resolution and complications of the diagnosis(es) in detail. Medication risks, benefits, costs, interactions, and alternatives were discussed as indicated. No intake/output data recorded. Last 3 Recorded Weights in this Encounter    02/03/22 1108   Weight: 82.1 kg (181 lb)         02/02 1901 - 02/04 0700  In: 700 [I.V.:700]  Out: -     SUBJECTIVE      80 y.o.  male, well known to our office, with PMH significant for AS, PAF, CVA - recent in Dec, HTN, HLD, CKD. Admit for CHF exacerbation, possible PNA and severe AS. Joselo Aureliano Gift reports  breathing significantly improved.        Current Facility-Administered Medications   Medication Dose Route Frequency    bumetanide (BUMEX) injection 1 mg  1 mg IntraVENous Q12H    heparin (porcine) injection 5,000 Units  5,000 Units SubCUTAneous Q8H    metoprolol tartrate (LOPRESSOR) tablet 25 mg  25 mg Oral Q12H    sodium chloride (NS) flush 5-10 mL  5-10 mL IntraVENous PRN    piperacillin-tazobactam (ZOSYN) 3.375 g in 0.9% sodium chloride (MBP/ADV) 100 mL MBP  3.375 g IntraVENous Q8H    sodium chloride (NS) flush 5-40 mL  5-40 mL IntraVENous Q8H    sodium chloride (NS) flush 5-40 mL  5-40 mL IntraVENous PRN    acetaminophen (TYLENOL) tablet 650 mg  650 mg Oral Q6H PRN    Or    acetaminophen (TYLENOL) suppository 650 mg  650 mg Rectal Q6H PRN    polyethylene glycol (MIRALAX) packet 17 g  17 g Oral DAILY PRN    ondansetron (ZOFRAN ODT) tablet 4 mg  4 mg Oral Q8H PRN    Or    ondansetron (ZOFRAN) injection 4 mg  4 mg IntraVENous Q6H PRN    glucose chewable tablet 16 g  4 Tablet Oral PRN    dextrose (D50W) injection syrg 12.5-25 g  25-50 mL IntraVENous PRN    glucagon (GLUCAGEN) injection 1 mg  1 mg IntraMUSCular PRN    insulin glargine (LANTUS) injection 16 Units  0.2 Units/kg SubCUTAneous DAILY    insulin lispro (HUMALOG) injection   SubCUTAneous AC&HS    vancomycin (VANCOCIN) 750 mg in 0.9% sodium chloride 250 mL (VIAL-MATE)  750 mg IntraVENous Q24H     Current Outpatient Medications   Medication Sig    pravastatin (PRAVACHOL) 10 mg tablet Take 1 Tablet by mouth nightly.    sodium bicarbonate 650 mg tablet Take  by mouth four (4) times daily. bimatoprost (LUMIGAN) 0.03 % ophthalmic drops 1 gtt in each eye    timoloL (BETIMOL) 0.25 % ophthalmic solution Administer 1-2 Drops to both eyes two (2) times a day. use in affected eye(s)    amLODIPine (NORVASC) 5 mg tablet Take 1 Tablet by mouth daily. aspirin 81 mg chewable tablet Take 1 Tablet by mouth daily. furosemide (LASIX) 20 mg tablet Take 1 Tablet by mouth two (2) times daily as needed (Lower extremity swelling). (Patient not taking: Reported on 1/31/2022)    acetaminophen (Tylenol Arthritis Pain) 650 mg TbER Take 650 mg by mouth two (2) times daily as needed for Pain. cholecalciferol (Vitamin D3) (1000 Units /25 mcg) tablet Take 1,000 Units by mouth daily. metFORMIN ER (GLUCOPHAGE XR) 500 mg tablet Take 500 mg by mouth two (2) times a day. folic acid (FOLVITE) 1 mg tablet TAKE 1 TABLET DAILY    clopidogreL (PLAVIX) 75 mg tab TAKE 1 TABLET DAILY    predniSONE (DELTASONE) 1 mg tablet Take 1 Tablet by mouth daily (with breakfast). vit A,C,E-zinc-copper (PreserVision AREDS) cap capsule Take 1 Capsule by mouth two (2) times a day. sulfaSALAzine (AZULFIDINE) 500 mg tablet Take 2 Tablets by mouth two (2) times a day. turmeric-herbal complex no. 278 150 mg cap Take 1,000 mg by mouth.    ezetimibe (ZETIA) 10 mg tablet Take 10 mg by mouth daily. COREG CR 10 mg CR capsule Take 10 mg by mouth daily. cyanocobalamin (VITAMIN B-12) 1,000 mcg tablet Take 1,000 mcg by mouth daily.     omega-3 fatty acids-vitamin e (FISH OIL) 1,000 mg Cap Take 1 Capsule by mouth two (2) times a day. benazepril (LOTENSIN) 40 mg tablet Take 40 mg by mouth daily. bimatoprost (LUMIGAN) 0.03 % ophthalmic drops Administer 1 Drop to both eyes every evening. OBJECTIVE               Intake/Output Summary (Last 24 hours) at 2/4/2022 0813  Last data filed at 2/4/2022 0137  Gross per 24 hour   Intake 700 ml   Output --   Net 700 ml       Review of Systems - History obtained from the patient AS PER  HPI        PHYSICAL EXAM        Visit Vitals  BP (!) 141/84   Pulse (!) 113   Temp 97.6 °F (36.4 °C)   Resp 29   Ht 5' 8\" (1.727 m)   Wt 82.1 kg (181 lb)   SpO2 95%   BMI 27.52 kg/m²       Gen: Well-developed, well-nourished, in no acute distress  alert and oriented x 3  HEENT:  Pink conjunctivae, Hearing grossly normal.No scleral icterus or conjunctival, moist mucous membranes  Neck: Supple,No JVD, No Carotid Bruit, Thyroid- non tender No cervical lymphadenopathy  Resp: No accessory muscle use, breath sounds improved, No rales or rhonchi  Card: Irregular Rate,Rythm,Normal S1, S2, 2/6 murmurs, no rubs or gallop. No thrills. MSK: No cyanosis or clubbing, good capillary refill  Skin: No rashes or ulcers, no bruising  Neuro:  Moving all four extremities, no focal deficit, follows commands appropriately  Psych:  Fair insight, oriented to person, place and time, alert, Nml Affect  LE: Trace edema       DATA REVIEW      No specialty comments available. Cardiac monitor: A-fib        Laboratory and Imaging have been reviewed by me and are notable for  No results for input(s): CPK, CKMB, TROIQ in the last 72 hours.   Recent Labs     02/04/22  0206 02/03/22  1129   * 127*   K 4.2 5.1   CO2 24 23   BUN 34* 31*   CREA 1.41* 1.54*   * 306*   PHOS 5.1*  --    MG 1.8  --    WBC 16.4* 13.7*   HGB 9.0* 9.6*   HCT 27.2* 29.2*   * 525*

## 2022-02-04 NOTE — PROGRESS NOTES
OT Order received though services no longer needed due to transition to hospice.  Completing order  Mery Balderas, OTR/L

## 2022-02-04 NOTE — DISCHARGE INSTRUCTIONS
HOSPITALIST DISCHARGE INSTRUCTIONS  NAME: Anila Farrell   :  1930   MRN:  [id number]     Date/Time:  2022 12:26 PM    ADMIT DATE: 2/3/2022     DISCHARGE DATE: 2022     ADMITTING DIAGNOSIS:  Acute on chronic diastolic CHF from severe aortic stenosis, pneumonia     DISCHARGE DIAGNOSIS:  same    MEDICATIONS:  See after visit summary       · It is important that you take the medication exactly as they are prescribed. · Keep your medication in the bottles provided by the pharmacist and keep a list of the medication names, dosages, and times to be taken in your wallet. · Do not take other medications without consulting your doctor     Pain Management: per above medications    What to do at Home    Recommended diet:  Cardiac Diet    Recommended activity: Activity as tolerated    1) Return to the hospital if you feel worse    2) If you experience any of the following symptoms then please call your primary care physician or return to the emergency room if you cannot get hold of your doctor:  Fever, chills, nausea, vomiting, diarrhea, change in mentation, falling, bleeding, shortness of breath, chest pain, severe headache, severe abdominal pain,     3) Follows hospice instructions     Follow Up: Follow-up Information     Follow up With Specialties Details Why Contact Info    Josh Sullivan MD Internal Medicine Schedule an appointment as soon as possible for a visit in 5 days  [facility] [address]  [place] [id number] [phone]      [facility] [address] [place] [address]  [phone]            Information obtained by :  I understand that if any problems occur once I am at home I am to contact my physician. I understand and acknowledge receipt of the instructions indicated above.                                                                                                                                            Physician's or R.N.'s Signature                                                                  Date/Time                                                                                                                                              Patient or Representative Signature                                                          Date/Time    Patient Education        Hospice: Care Instructions  Your Care Instructions  Hospice care provides medical treatment to relieve symptoms at the end of life. The goal is to keep you comfortable, not to try to cure you. Hospice care does not speed up or lengthen dying. It focuses on easing pain and other symptoms. Hospice caregivers want to enhance your quality of life. Hospice care also offers emotional help and spiritual support when you are dying. It also helps family members care for a loved one who is dying. Hospice care can help you review your life, say important things to family and friends, and explore spiritual issues. Hospice also helps your family and friends deal with their grief after you die. You can use hospice care if your illness cannot be cured and doctors believe you have no more than 6 months to live. You do not need to be confined to a bed or in a hospital to benefit from this type of care. The hospice team includes nurses, counselors, therapists, social workers, pastors, home health aides, and trained volunteers. You can get care in your own home or in a hospice center. Some hospice workers also go to nursing homes or hospitals. How can you care for yourself at home? · Prepare a list of advance directives. These are instructions to your doctor and family members about what kind of care you want if you become unable to speak or express yourself. · Find out if your health insurance covers hospice care. · Find hospice programs in your area. People who can help include your doctor, your state health department, and your insurance company.   · Decide what kinds of hospice services you want. It helps to know what you want before you enter a hospice program.  · Think about some questions when preparing for hospice care. ? Who do you want to make decisions about your medical care if you are not able to? Many people choose their spouse, child, or doctor. ? What are you most afraid of that might happen? You might be afraid of having pain or losing your independence. Let your hospice team know your fears. The team can help you deal with them. ? Where would you prefer to die? Choices include your home, a hospital, or a nursing home. ? Do you want to donate organs when you die? Make sure that your family clearly understands this. ? Do you want any Pentecostalism rites or practices to be done before you die? Let your hospice team know what you want. Where can you learn more? Go to http://www.gray.com/  Enter A252 in the search box to learn more about \"Hospice: Care Instructions. \"  Current as of: March 17, 2021               Content Version: 13.0  © 2006-2021 [employer]. Care instructions adapted under license by [employer] (which disclaims liability or warranty for this information). If you have questions about a medical condition or this instruction, always ask your healthcare professional. [place] [address] any warranty or liability for your use of this information. Patient Education        Heart Failure: Care Instructions  Your Care Instructions     Heart failure occurs when your heart does not pump as much blood as the body needs. Failure does not mean that the heart has stopped pumping but rather that it is not pumping as well as it should. Over time, this causes fluid buildup in your lungs and other parts of your body. Fluid buildup can cause shortness of breath, fatigue, swollen ankles, and other problems.  By taking medicines regularly, reducing sodium (salt) in your diet, checking your weight every day, and making lifestyle changes, you can feel better and live longer. Follow-up care is a key part of your treatment and safety. Be sure to make and go to all appointments, and call your doctor if you are having problems. It's also a good idea to know your test results and keep a list of the medicines you take. How can you care for yourself at home? Medicines    · Be safe with medicines. Take your medicines exactly as prescribed. Call your doctor if you think you are having a problem with your medicine.     · Do not take any vitamins, over-the-counter medicine, or herbal products without talking to your doctor first. Bridgette Coma not take ibuprofen (Advil or Motrin) and naproxen (Aleve) without talking to your doctor first. They could make your heart failure worse.     · You may take some of the following medicine. ? Angiotensin-converting enzyme inhibitors (ACEIs) or angiotensin II receptor blockers (ARBs) reduce the heart's workload, lower blood pressure, and reduce swelling. Taking an ACEI or ARB may lower your chance of needing to be hospitalized. ? Beta-blockers can slow heart rate, decrease blood pressure, and improve your condition. Taking a beta-blocker may lower your chance of needing to be hospitalized. ? Diuretics, also called water pills, reduce swelling. You will get more details on the specific medicines your doctor prescribes. Diet    · Your doctor may suggest that you limit sodium. Your doctor can tell you how much sodium is right for you. An example is less than 3,000 mg a day. This includes all the salt you eat in cooking or in packaged foods. People get most of their sodium from processed foods. Fast food and restaurant meals also tend to be very high in sodium.     · Ask your doctor how much liquid you can drink each day. You may have to limit liquids. Weight    · Weigh yourself without clothing at the same time each day. Record your weight.  Call your doctor if you have a sudden weight gain, such as more than 2 to 3 pounds in a day or 5 pounds in a week. (Your doctor may suggest a different range of weight gain.) A sudden weight gain may mean that your heart failure is getting worse. Activity level    · Start light exercise (if your doctor says it is okay). Even if you can only do a small amount, exercise will help you get stronger, have more energy, and manage your weight and your stress. Walking is an easy way to get exercise. Start out by walking a little more than you did before. Bit by bit, increase the amount you walk.     · When you exercise, watch for signs that your heart is working too hard. You are pushing yourself too hard if you cannot talk while you are exercising. If you become short of breath or dizzy or have chest pain, stop, sit down, and rest.     · If you feel \"wiped out\" the day after you exercise, walk slower or for a shorter distance until you can work up to a better pace.     · Get enough rest at night. Sleeping with 1 or 2 pillows under your upper body and head may help you breathe easier. Lifestyle changes    · Do not smoke. Smoking can make a heart condition worse. If you need help quitting, talk to your doctor about stop-smoking programs and medicines. These can increase your chances of quitting for good. Quitting smoking may be the most important step you can take to protect your heart.     · Limit alcohol to 2 drinks a day for men and 1 drink a day for women. Too much alcohol can cause health problems.     · Avoid getting sick from colds and the flu. Get a pneumococcal vaccine shot. If you have had one before, ask your doctor whether you need another dose. Get a flu shot each year. If you must be around people with colds or the flu, wash your hands often. When should you call for help?    Call 911  if you have symptoms of sudden heart failure such as:    · You have severe trouble breathing.     · You cough up pink, foamy mucus.     · You have a new irregular or rapid heartbeat. Call your doctor now or seek immediate medical care if:    · You have new or increased shortness of breath.     · You are dizzy or lightheaded, or you feel like you may faint.     · You have sudden weight gain, such as more than 2 to 3 pounds in a day or 5 pounds in a week. (Your doctor may suggest a different range of weight gain.)     · You have increased swelling in your legs, ankles, or feet.     · You are suddenly so tired or weak that you cannot do your usual activities. Watch closely for changes in your health, and be sure to contact your doctor if you develop new symptoms. Where can you learn more? Go to http://www.gray.com/  Enter Z454 in the search box to learn more about \"Heart Failure: Care Instructions. \"  Current as of: April 29, 2021               Content Version: 13.0  © 2006-2021 [employer]. Care instructions adapted under license by [employer] (which disclaims liability or warranty for this information). If you have questions about a medical condition or this instruction, always ask your healthcare professional. [place] [address] any warranty or liability for your use of this information.

## 2022-02-04 NOTE — PROGRESS NOTES
I have reviewed discharge instructions with the spouse. The patient and spouse verbalized understanding. Went over all papers. Answered all questions.

## 2022-02-04 NOTE — ED NOTES
Bedside shift change report given to Neva RAMÍREZ by Alen. Report included the following information SBAR, ED Summary, MAR and Recent Results.

## 2022-02-04 NOTE — CARDIO/PULMONARY
[facility] Cardiopulmonary Rehab: 79 yo admitted with SOB (2/3). Per Dr Ori So dx includes: Acute HFpEF, severe AS &  Acute hypoxic Respiratory Failure. LVEF 55-60% by echo (12/31/21). Elevated Troponin possibly due to demand ischemia & Aortic Stenosis. Previous there was a plan for LHC/RHC in prep for TAVR. However, pt has decided he would prefer to return home with Hospice care. Patient does not meet criteria for participation in outpatient cardiac rehab.

## 2022-02-04 NOTE — ACP (ADVANCE CARE PLANNING)
Advance Care Planning 2/4/2022   Patient's Healthcare Decision Maker is: Named in scanned ACP document   Confirm Advance Directive Yes, on file   Patient Would Like to Complete Advance Directive Already in place     Pt has AMD on file dated 12/31/2021 which appoints wife Donnie Holloway and dtr Karen Padilla as primary and secondary Medical POAs, respectively. Pt now has DNR order in place. Pt will be returning home with hospice support.

## 2022-02-04 NOTE — PROGRESS NOTES
2/4/2022  11:32 AM  Case management note    Palliative spoke with patient, patient wants to hospice  Referral sent to [facility] hospice, Luis Eduardo Rothman checking to see if they can accept. Accepted by [facility], wife will transport home, portable tank in room for transport. Will continue to follow.   Stephen Cordova

## 2022-02-04 NOTE — PROGRESS NOTES
Spiritual Care Assessment/Progress Note  [facility] Sammy      NAME: Frankie Peña      MRN: [id number]  AGE: 80 y.o.  SEX: male  Anabaptism Affiliation: Shinto   Language: English     2/4/2022     Total Time (in minutes): 30     Spiritual Assessment begun in [facility] EMERGENCY DEPT through conversation with:         [x]Patient        [x] Family    [] Friend(s)        Reason for Consult: Palliative Care, Initial/Spiritual Assessment     Spiritual beliefs: (Please include comment if needed)     [x] Identifies with a kerwin tradition:         [] Supported by a kerwin community:            [] Claims no spiritual orientation:           [] Seeking spiritual identity:                [] Adheres to an individual form of spirituality:           [] Not able to assess:                           Identified resources for coping:      [] Prayer                               [] Music                  [] Guided Imagery     [x] Family/friends                 [] Pet visits     [] Devotional reading                         [] Unknown     [] Other:                                              Interventions offered during this visit: (See comments for more details)    Patient Interventions: Affirmation of emotions/emotional suffering,Affirmation of kerwin,Catharsis/review of pertinent events in supportive environment,End of life issues discussed,Iconic (affirming the presence of God/Higher Power),Normalization of emotional/spiritual concerns           Plan of Care:     [] Support spiritual and/or cultural needs    [] Support AMD and/or advance care planning process      [] Support grieving process   [] Coordinate Rites and/or Rituals    [] Coordination with community clergy   [] No spiritual needs identified at this time   [] Detailed Plan of Care below (See Comments)  [] Make referral to Music Therapy  [] Make referral to Pet Therapy     [] Make referral to Addiction services  [] Make referral to [facility]  [] Make referral to Spiritual Care Partner  [] No future visits requested        [] Follow up visits as needed     Visited pt for palliative spiritual assessment. Pt's wife Franchesca Pitts was at bedside. Pt remembers  from a previous hospital stay. Both spoke of the decision process around whether he should pursue a heart procedure. He reports that he will likely not choose to accept the procedure. \"This means my end game. \" We talked about what that means and he spoke of choosing to enjoy and embrace what ever time he has remaining. Franchesca Pitts supports his decision.    Chaplain Elder, MDiv, MS, [facility]

## 2022-02-04 NOTE — PROGRESS NOTES
Donald Franz [facility] [address]  [address] [facility], [place], [address] [place] [address]  [phone]      Medical Progress Note      NAME: Lesli Turner   :  1930  MRM:  [id number]    Date/Time of service: 2022  9:07 AM       Subjective:     Chief Complaint:  Patient was personally seen and examined by me during this time period. Chart reviewed. Dyspnea is improving. No chest pain. Spoke to wife at bedside        Objective:       Vitals:       Last 24hrs VS reviewed since prior progress note.  Most recent are:    Visit Vitals  BP (!) 141/84   Pulse (!) 113   Temp 98.8 °F (37.1 °C)   Resp 29   Ht 5' 8\" (1.727 m)   Wt 82.1 kg (181 lb)   SpO2 95%   BMI 27.52 kg/m²     SpO2 Readings from Last 6 Encounters:   22 95%   22 98%   22 95%   22 96%   22 99%   21 95%    O2 Flow Rate (L/min): 2 l/min       Intake/Output Summary (Last 24 hours) at 2022 9422  Last data filed at 2022 6052  Gross per 24 hour   Intake 700 ml   Output --   Net 700 ml        Exam:     Physical Exam:    Gen:  Elderly, disheveled, ill-appearing, NAD  HEENT:  Pink conjunctivae, PERRL, hearing intact to voice, moist mucous membranes  Neck:  Supple, without masses, thyroid non-tender  Resp:  No accessory muscle use, some crackles at bases   Card:  3/6 murmurs, normal S1, S2 without thrills, +edema  Abd:  Soft, non-tender, non-distended, normoactive bowel sounds are present  Musc:  No cyanosis or clubbing  Skin:  No rashes   Neuro:  Cranial nerves 3-12 are grossly intact, follows commands appropriately  Psych:  fair insight, oriented to person, place and time, alert    Medications Reviewed: (see below)    Lab Data Reviewed: (see below)    ______________________________________________________________________    Medications:     Current Facility-Administered Medications   Medication Dose Route Frequency    bumetanide (BUMEX) injection 1 mg  1 mg IntraVENous Q12H    heparin (porcine) injection 5,000 Units  5,000 Units SubCUTAneous Q8H    metoprolol tartrate (LOPRESSOR) tablet 25 mg  25 mg Oral Q12H    doxycycline (VIBRAMYCIN) 100 mg in 0.9% sodium chloride (MBP/ADV) 100 mL MBP  100 mg IntraVENous Q12H    cefTRIAXone (ROCEPHIN) 2 g in 0.9% sodium chloride 20 mL IV syringe  2 g IntraVENous Q24H    sodium chloride (NS) flush 5-10 mL  5-10 mL IntraVENous PRN    sodium chloride (NS) flush 5-40 mL  5-40 mL IntraVENous Q8H    sodium chloride (NS) flush 5-40 mL  5-40 mL IntraVENous PRN    acetaminophen (TYLENOL) tablet 650 mg  650 mg Oral Q6H PRN    Or    acetaminophen (TYLENOL) suppository 650 mg  650 mg Rectal Q6H PRN    polyethylene glycol (MIRALAX) packet 17 g  17 g Oral DAILY PRN    ondansetron (ZOFRAN ODT) tablet 4 mg  4 mg Oral Q8H PRN    Or    ondansetron (ZOFRAN) injection 4 mg  4 mg IntraVENous Q6H PRN    glucose chewable tablet 16 g  4 Tablet Oral PRN    dextrose (D50W) injection syrg 12.5-25 g  25-50 mL IntraVENous PRN    glucagon (GLUCAGEN) injection 1 mg  1 mg IntraMUSCular PRN    insulin glargine (LANTUS) injection 16 Units  0.2 Units/kg SubCUTAneous DAILY    insulin lispro (HUMALOG) injection   SubCUTAneous AC&HS     Current Outpatient Medications   Medication Sig    pravastatin (PRAVACHOL) 10 mg tablet Take 1 Tablet by mouth nightly.  sodium bicarbonate 650 mg tablet Take  by mouth four (4) times daily.  bimatoprost (LUMIGAN) 0.03 % ophthalmic drops 1 gtt in each eye    timoloL (BETIMOL) 0.25 % ophthalmic solution Administer 1-2 Drops to both eyes two (2) times a day. use in affected eye(s)    amLODIPine (NORVASC) 5 mg tablet Take 1 Tablet by mouth daily.  aspirin 81 mg chewable tablet Take 1 Tablet by mouth daily.  furosemide (LASIX) 20 mg tablet Take 1 Tablet by mouth two (2) times daily as needed (Lower extremity swelling).  (Patient not taking: Reported on 1/31/2022)    acetaminophen (Tylenol Arthritis Pain) 650 mg TbER Take 650 mg by mouth two (2) times daily as needed for Pain.  cholecalciferol (Vitamin D3) (1000 Units /25 mcg) tablet Take 1,000 Units by mouth daily.  metFORMIN ER (GLUCOPHAGE XR) 500 mg tablet Take 500 mg by mouth two (2) times a day.  folic acid (FOLVITE) 1 mg tablet TAKE 1 TABLET DAILY    clopidogreL (PLAVIX) 75 mg tab TAKE 1 TABLET DAILY    predniSONE (DELTASONE) 1 mg tablet Take 1 Tablet by mouth daily (with breakfast).  vit A,C,E-zinc-copper (PreserVision AREDS) cap capsule Take 1 Capsule by mouth two (2) times a day.  sulfaSALAzine (AZULFIDINE) 500 mg tablet Take 2 Tablets by mouth two (2) times a day.  turmeric-herbal complex no. 278 150 mg cap Take 1,000 mg by mouth.  ezetimibe (ZETIA) 10 mg tablet Take 10 mg by mouth daily.  COREG CR 10 mg CR capsule Take 10 mg by mouth daily.  cyanocobalamin (VITAMIN B-12) 1,000 mcg tablet Take 1,000 mcg by mouth daily.  omega-3 fatty acids-vitamin e (FISH OIL) 1,000 mg Cap Take 1 Capsule by mouth two (2) times a day.  benazepril (LOTENSIN) 40 mg tablet Take 40 mg by mouth daily.  bimatoprost (LUMIGAN) 0.03 % ophthalmic drops Administer 1 Drop to both eyes every evening.           Lab Review:     Recent Labs     02/04/22  0206 02/03/22  1129   WBC 16.4* 13.7*   HGB 9.0* 9.6*   HCT 27.2* 29.2*   * 525*     Recent Labs     02/04/22  0206 02/03/22  1129   * 127*   K 4.2 5.1   CL 97 94*   CO2 24 23   * 306*   BUN 34* 31*   CREA 1.41* 1.54*   CA 9.1 9.2   MG 1.8  --    PHOS 5.1*  --    ALB  --  2.9*   TBILI  --  0.9   ALT  --  8*     Lab Results   Component Value Date/Time    Glucose (POC) 187 (H) 02/04/2022 01:41 AM    Glucose (POC) 254 (H) 02/03/2022 02:55 PM    Glucose (POC) 124 (H) 12/31/2021 11:58 AM    Glucose (POC) 142 (H) 12/31/2021 08:28 AM    Glucose (POC) 149 (H) 12/30/2021 10:43 PM          Assessment / Plan:     81 yo hx of HTN, DM, dCHF, severe AS, CVA, PMR, UC, presented w/ resp failure, pulm edema, CHF, NSTEMI    1) Acute resp failure/hypoxia: likely from a combination of pulm edema and pneumonia. Cont O2, incentive spirometry. Low threshold for bipap    2) Acute on chronic diastolic CHF: due to severe AS. Repeat echo pending. Cont IV bumex    3) Severe AS: will need TAVR vs hospice. Family and patient interested in procedure. Will defer to Cards    4) NSTEMI: could be demand ischemia from CHF and AS. Will trend trop. Cont toprol, ASA, statin    5) Sepsis/pneumonia: will monitor cultures. COVID neg. Will d/c Vanc/Zosyn. Start IV CTX/Doxy    6) Hyponatremia: due to hypervolemia. Will monitor     7) DM type 2: check A1C. Hold metformin.   Cont lantus, SSI    8) HTN: cont metoprolol, norvasc    9) PMR: on chronic steroids    10) UC: on sulfasalazine    Total time spent with patient: 39 Minutes **I personally saw and examined the patient during this time period**                 Care Plan discussed with: Patient, nursing, family    Discussed:  Care Plan    Prophylaxis:  Hep SQ    Disposition:   PT, OT, RN           ___________________________________________________    Attending Physician: Estell Nissen, MD

## 2022-02-04 NOTE — PALLIATIVE CARE DISCHARGE
The Palliative Medicine team was consulted as part of your / your loved one's care in the hospital. Our team is a supportive service; we strive to relieve suffering and improve quality of life. You identified the following goal(s) as your main focus for healthcare:   Embracing each day as it comes. You will have hospice support in place at home. We reviewed advance care planning information, which includes the following:  Advance Care Planning 2/4/2022   Patient's Healthcare Decision Maker is: Named in scanned ACP document   Confirm Advance Directive Yes, on file   Patient Would Like to Complete Advance Directive Completed 12/31/2021       We reviewed / discussed your code status as: DNR     Full Code means perform CPR in the event of cardiac arrest     [facility] means do NOT perform CPR in the event of cardiac arrest     Partial Code means you have specific preferences, please discuss with your health care team     No Order means this issue was not addressed / resolved during your stay    Because of the importance of this information, we are providing you with a printed copy to share with other healthcare providers after this hospitalization is complete. If any of the above information is incomplete or incorrect, please contact the Palliative Medicine team at [phone].

## 2022-02-04 NOTE — ED NOTES
Bedside and Verbal shift change report given to Norris RN (oncoming nurse) by Maddie RN (offgoing nurse). Report included the following information SBAR, Kardex, ED Summary, Intake/Output, Recent Results and Cardiac Rhythm AF.

## 2022-02-04 NOTE — DISCHARGE SUMMARY
Donald Osei [facility] 79  [address] [facility], [address] [place] [address]  [phone]    Physician Discharge Summary     Patient ID:  Carine Perrin  [id number]  80 y.o.  12/26/1930    Admit date: 2/3/2022    Discharge date and time: 2/4/2022 12:27 PM    Admission Diagnoses: Acute hypoxemic respiratory failure ([address] [J96.01]    Discharge Diagnoses:  Principal Diagnosis Diastolic CHF, acute on chronic [facility])                                            Principal Problem:    Diastolic CHF, acute on chronic ([address] (2/4/2022)    Active Problems:    Acute hypoxemic respiratory failure ([address] (2/3/2022)      Severe aortic stenosis (2/4/2022)      Pneumonia (2/4/2022)           Hospital Course:     79 yo hx of HTN, DM, dCHF, severe AS, CVA, PMR, UC, presented w/ resp failure, pulm edema, CHF, NSTEMI     1) Acute resp failure/hypoxia: likely from a combination of pulm edema and pneumonia. Poor prognosis. Patient and family has decided on home hospice. Cont O2, incentive spirometry     2) Acute on chronic diastolic CHF: due to severe AS. Poor prognosis. Cont hospice. Cont bumex      3) Severe AS: poor prognosis. Cont hospice      4) NSTEMI: could be demand ischemia from CHF and AS. Cont BB, ASA, statin     5) Sepsis/pneumonia: will monitor cultures. COVID neg. Was on IV CTX/Doxy. Will finish a course of Doxy/omnicef      6) Hyponatremia: due to hypervolemia.   Will monitor      7) DM type 2: can stop metformin due to hospice      8) HTN: cont metoprolol, norvasc     9) PMR: on chronic steroids     10) UC: on sulfasalazine    PCP: Marius Merlin, MD     Consults: Hospice, palliative, Cards    Significant Diagnostic Studies: echo    Discharge Exam:  Physical Exam:    See daily note     Disposition: home hospice   Discharge Condition: Stable    Patient Instructions:   Current Discharge Medication List      START taking these medications    Details   bumetanide (BUMEX) 1 mg tablet Take 1 Tablet by mouth two (2) times a day. Qty: 60 Tablet, Refills: 0      cefdinir (OMNICEF) 300 mg capsule Take 1 Capsule by mouth two (2) times a day for 7 days. Qty: 14 Capsule, Refills: 0      doxycycline (ADOXA) 100 mg tablet Take 1 Tablet by mouth two (2) times a day for 7 days. Qty: 14 Tablet, Refills: 0         CONTINUE these medications which have NOT CHANGED    Details   pravastatin (PRAVACHOL) 10 mg tablet Take 1 Tablet by mouth nightly. Qty: 90 Tablet, Refills: 1    Associated Diagnoses: Cerebrovascular accident (CVA), unspecified mechanism ([facility] Hyperlipidemia LDL goal <100      sodium bicarbonate 650 mg tablet Take  by mouth four (4) times daily. !! bimatoprost (LUMIGAN) 0.03 % ophthalmic drops 1 gtt in each eye      timoloL (BETIMOL) 0.25 % ophthalmic solution Administer 1-2 Drops to both eyes two (2) times a day. use in affected eye(s)      amLODIPine (NORVASC) 5 mg tablet Take 1 Tablet by mouth daily. Qty: 90 Tablet, Refills: 1    Associated Diagnoses: Essential hypertension, benign      aspirin 81 mg chewable tablet Take 1 Tablet by mouth daily. Qty: 30 Tablet, Refills: 0      acetaminophen (Tylenol Arthritis Pain) 650 mg TbER Take 650 mg by mouth two (2) times daily as needed for Pain. cholecalciferol (Vitamin D3) (1000 Units /25 mcg) tablet Take 1,000 Units by mouth daily. folic acid (FOLVITE) 1 mg tablet TAKE 1 TABLET DAILY  Qty: 90 Tablet, Refills: 3    Associated Diagnoses: Ulcerative colitis with complication, unspecified location ([facility])      clopidogreL (PLAVIX) 75 mg tab TAKE 1 TABLET DAILY  Qty: 90 Tablet, Refills: 3    Associated Diagnoses: Transient cerebral ischemia, unspecified type      predniSONE (DELTASONE) 1 mg tablet Take 1 Tablet by mouth daily (with breakfast). Qty: 90 Tablet, Refills: 3    Associated Diagnoses: PMR (polymyalgia rheumatica) ([facility])      vit A,C,E-zinc-copper (PreserVision AREDS) cap capsule Take 1 Capsule by mouth two (2) times a day.       sulfaSALAzine (AZULFIDINE) 500 mg tablet Take 2 Tablets by mouth two (2) times a day. Qty: 360 Tablet, Refills: 0    Associated Diagnoses: Ulcerative colitis with complication, unspecified location (HCC)      turmeric-herbal complex no. 278 150 mg cap Take 1,000 mg by mouth.      ezetimibe (ZETIA) 10 mg tablet Take 10 mg by mouth daily. COREG CR 10 mg CR capsule Take 10 mg by mouth daily. cyanocobalamin (VITAMIN B-12) 1,000 mcg tablet Take 1,000 mcg by mouth daily. omega-3 fatty acids-vitamin e (FISH OIL) 1,000 mg Cap Take 1 Capsule by mouth two (2) times a day. benazepril (LOTENSIN) 40 mg tablet Take 40 mg by mouth daily. !! bimatoprost (LUMIGAN) 0.03 % ophthalmic drops Administer 1 Drop to both eyes every evening. !! - Potential duplicate medications found. Please discuss with provider.       STOP taking these medications       furosemide (LASIX) 20 mg tablet Comments:   Reason for Stopping:         metFORMIN ER (GLUCOPHAGE XR) 500 mg tablet Comments:   Reason for Stopping:             Activity: Activity as tolerated  Diet: Cardiac Diet  Wound Care: None needed    Follow-up with  Follow-up Information     Follow up With Specialties Details Why Contact Info    Cheri Sullivan Ace, MD Internal Medicine Schedule an appointment as soon as possible for a visit in 5 days  [facility] [phone]  [facility] 99 [address] [facility] [phone]  [phone]          Follow-up tests/labs none    Signed:  Felix Saucedo MD  2/4/2022  12:27 PM  **I personally spent 35 min on discharge**

## 2022-02-04 NOTE — NURSE NAVIGATOR
Chart reviewed by Heart Failure Nurse Navigator. Heart Failure database completed. Patient admitted with shortness of breath in setting of known severe aortic stenosis. He was taking lasix as needed but felt poorly when he needed to take it. He was in process of referral to Dr Aguilar Grider at valve clinic for possible TAVR work up. He and his wife have had extensive discussions with Dr Alberto Randle and telephone conversation with Dr Aguilar Grider. At this time, patient has elected to not pursue aortic valve repair and has requested to go home with hospice. EF:  55 to 60% with severe aortic stenosis    ACEi/ARB/ARNi: Benazepril 40 mg daily    BB: Coreg CR 10 mg daily    Aldosterone Antagonist: **    Obstructive Sleep Apnea Screening:   STOP-BANG score:   Referred to Sleep Medicine:     CRT Not currently indicated. NYHA Functional Class IV. Heart Failure Teach Back in Patient Education. Heart Failure Avoiding Triggers on Discharge Instructions. Cardiologist: Dr Makenzie Rahman discharge follow up phone call to be made within 48-72 hours of discharge.

## 2022-02-04 NOTE — CONSULTS
Palliative medicine brief note- Full visit documentation pending      Met with Mr. Kavya Olmedo and his wife. GOC discussion- Mr. Kavya Olmedo and his wife have made decision not to pursue cardiac cath/TAVR. They are interested in going home with Hospice. · I will reach out to [facility], ask that they meet with patient this afternoon. ·  Order for CM to consult placed, CM Darylene Riddle is aware. · DME needs, include the following;  O2 concentrator, hospital bed, bedside commode, shower chair, over bed table and wheel chair     DNR discussion- Patient elected DNR. Order for DNR placed in EMR. I also notified patients nurse Neva RN of code status change. Please contact me via [employer] with any urgent needs.

## 2022-02-04 NOTE — PROGRESS NOTES
1213  Update:  Patient choosing Home Hospice. Will complete order. Please reconsult if new needs arise. 1120  Reviewed chart and attempted Physical Therapy Evaluation. Patient is currently having a family conference. Will continue to attempt at a later time.

## 2022-02-04 NOTE — HOSPICE
[facility]  [phone]    Hospice Nursing PRE-Admission   Discharge Summary  Patient Name: Caitlyn Perrin  YOB: 1930  Age: 80 y.o. Date of PLANNED Hospice Admission: 02/04/22 5pm  Hospice Attending: Dr Ricardo Ham  Primary Care Physician: Jillian Turpin [id number] [facility] [phone]    Primary Contact and Phone:Denise Leah Gold, spouse [phone]      ADVANCE CARE PLANNING    Code Status: DNR  Durable DNR: [x]  Yes  []  No to travel home w/ patient   Advance Care Planning 2/4/2022   Patient's [address] is: Named in scanned ACP document   Confirm Advance Directive Yes, on file   Patient Would Like to Complete Advance Directive -       M[employer]ism: [address] [place] [address] East: TBD    HOSPICE SUMMARY     Verbal CTI of terminal diagnosis with life expectancy of 6 months or less received from: Dr Lupe Santana    For the Hospice Diagnosis of: end stage cardiac disease    NCD: Requested/Declined      CLINICAL INFORMATION   Allergies: Allergies   Allergen Reactions    Statins-Hmg-Coa Reductase Inhibitors Myalgia         Currently this patient has:  [x] Supplemental O2  COVID Screening:   Negative 02/03/22      ASSESSMENT & PLAN     1. Symptom Issues Identified: dyspnea w/ exertion and long conversation    2. Spiritual Issues Identified: TBD    3. Psych/ Social/ Emotional Issues Identified: patient lives w/ wife. Son in law lives across the street      [address] [facility]     1. Hospice Consents: signed by patient and scanned    2. DME Ordered/Company/Delivery Plan: [employer] delivery between 3-6 this evening. O2 tank delivered to bedside for transport home      3. Symptom Kit and other Medications Needs: Morphine and Ativan sent to [facility] [place] for wife to     4. Home Admission Reservation: 02/04 5pm    5. Transportation by: wife   Scheduled for between 3-4:30        6.     Verbal Report/Handoff given to: preadmit sent to hospice intake     7. Phone number to Hospital [phone].  Supplies/Wound Care: [facility]

## 2022-02-05 NOTE — HOSPICE
Patient Name:  Danica Olmedo  YOB: 1930  Age: 80    Principle Hospice Diagnosis:  End Stage Cardiac Disease  Diagnoses RELATED to the terminal prognosis: (As discussed with Attending) Severe aortic stenosis, diastolic congestive heart failure, hypertension, pulmonary edema, cerebrovascular accident, Non-STEMI  Other Diagnoses: Diabetes-type 2, Polymyalgia Rheumatica, Ulcerative Colitis, pneumonia (2022)    Date of Hospice Admission: 22  Hospice Attending Elected by Patient:  Stanton Thomson MD  Primary Care Physician: Hermila Godinez MD    Admitting RN: Claude Carrasquillo CM: Arnaldo Wasserman  : Ulises Mcgrath  :  Angela Brar (pt declined services at this time)    Durable DNR:  Yes (on fridge at pt's home)     Home: not discussed    Direct Observation:  RN arrived at patient's home and met him and wife Danie Jeffrey. Patient, lethargic and reclining in chair in living room, had difficulty responding to questions due to dyspnea when speaking. Wife spoke on his behalf for most of admission visit, consulting with pt as needed. Pt dozed off several times during discussion about hospice services. BP measured several times, with both hypertensive and hypotensive readings. RR 27 at rest and O2 sats 97% on 3L O2; pt denied shortness of breath. Also denied pain. Fragile skin and scattered bruising on BUE. With RN assistance, pt walked to bathroom using rollater. Pt then assisted into hospital bed. Educated pt and wife about fall risk due to his increased weakness and dyspnea; instructed pt to always use rollater and O2 when ambulating and only do so with wife at his side. RN reviewed medication changes and use of morphine and lorazepam with wife. Encouraged wife to discontinue non-covered meds to reduce pill burden on pt. Also provided copy of [facility] booklet to wife. Informed wife that hospice is available by phone  for any questions/concerns.      Provided chux and lip balm from car stock. Supply order placed for Tues, 2/8 delivery: chux, L pull-up briefs, wipes, wash basin, cleansing foam, mouth swabs, moisturizing skin cream, barrier cream.  [employer] order placed for Tues/Wed delivery:  benazepril and SRK kit with liquid lorazepam and no morphine (morphine liquid and lorazepam tablets already in home)    FYI: Pt uses cpap and oxygen. If pt determines he needs both at night, a pressure valve will need to be requested from [employer]. ([facility] staff present at admission stated they can provide one if needed, but did not have one in truck stock during visit). Also, pt's cpap is a [employer]  Dreamstation. RN provided wife with link to [employer] website to determine if his cpap is included in 2021 recall. · Sat, 2/5 -Triage please call to check on pt. · Triage - please arrange for Monday SN visit. · HHA 1x/wk, starting wk of 2/7. ER Visits/ Hospitalizations in past year: three, all since Dec. 2021  Onset Date of Hospice Diagnosis: several years ago    Summary of Disease Progression Leading to Hospice Diagnosis: From Dr. Darrell Dunn Do's 2/4/22 discharge summary: 79 yo hx of HTN, DM, dCHF, severe AS, CVA, PMR, UC, presented w/ resp failure, pulm edema, CHF, NSTEMI   1) Acute resp failure/hypoxia: likely from a combination of pulm edema and pneumonia. Poor prognosis. Patient and family has decided on home hospice. Cont O2, incentive spirometry. 2) Acute on chronic diastolic CHF: due to severe AS. Poor prognosis. Cont hospice. Cont bumex. 3) Severe AS: poor prognosis. Cont hospice. 4) NSTEMI: could be demand ischemia from CHF and AS. Cont BB, ASA, statin. 5) Sepsis/pneumonia: will monitor cultures. COVID neg. Was on IV CTX/Doxy. Will finish a course of Doxy/Omnicef.  7) DM type 2: can stop metformin due to hospice. 8) HTN: cont metoprolol, Norvasc. 9) PMR: on chronic steroids. 10) UC: on sulfasalazine. \"     Use LCD Guidelines and list features:   Patients will be considered to be in the terminal stage of heart disease (life expectancy of six months or less) if they meet the following criteria. (1 and 2 should be present. Factors from 3 will add supporting documentation.):    ___X____  1. At the time of initial certification or recertification for hospice, the patient is or has been                           already optimally treated for heart disease or is not a candidate for a surgical procedure or                           has declined a procedure. (Optimally treated means that patients who are not on                           vasodilators have a medical reason for refusing these drugs, e.g., hypotension or renal                           disease.)  ___X____  2. The patient is classified as New York Heart Association (NYHA) Class IV and may have                           significant symptoms of heart failure or angina at rest. (Class IV patients with heart disease                           have an inability to carry on any physical activity without discomfort. Symptoms of heart                           failure or of the anginal syndrome may be present even at rest. If any physical activity is                           undertaken, discomfort is increased.) Significant congestive heart failure may be                          documented by an ejection fraction of ?20%, but is not required if not already available.  ________  3. Documentation of the following factors will support but is not required to establish                           eligibility for hospice care:                          ________  a. Treatment resistant symptomatic supraventricular or ventricular arrhythmias;                          ________  b. History of cardiac arrest or resuscitation;                          ________  c. History of unexplained syncope;                          ________  d. Brain embolism of cardiac origin;                          ________  e. Concomitant HIV disease.     Dr. Elizabeth Cardenas contacted on behalf of Dr. Hattie Garcia, who will serve as attending provider. Dr. Ledy Garcia provided verbal certification of terminal illness with prognosis of 6 months or less life expectancy, as well as orders for hospice admission, medications, and plan of treatment. Medication reconciliation also completed.     Currently this patient has:  Supplemental O2    MEDS:  I have reviewed the patient's medication list with MD and identified the following:  Nonformulary medications:   Unrelated medications: bimatoprost eye drops, Vit D3, Vit G-54, folic acid, sodium bicarbonate, sulfasalazine, timolol eye drops, turmeric herbal complex, Preservision    IDT communication to include MD, SN, SW, [address] [place] [address] and support team.

## 2022-02-07 NOTE — HOSPICE
Patient received lying in hospital bed with wife, Jeremy Mejia, present. RN discussed plan of care with patient and wife and reviewed medications with Jeremy Mejia. Jeremy Mejia reports that patient usually checks his blood sugars in the morning but has not over the last few days. Patient reports he has eaten a couple of small meals since last night, but Jaimegogo Mejia reports he was not eating much before then. Patient denies pain and endorses very mild shortness of breath that is tolerable. Return demonstration provided to patient and wife for safe ambulation/transfer from bed to chair with rollator and one assist. Supplies and medications pending delivery from admissions nurse. Encouraged patient and wife to call hospice with any needs or concerns.

## 2022-02-07 NOTE — HOSPICE
LCSW placed call to pt's wife, Ardie Runner to introduce self and SW services and completed virtual assessment via phone. Wife shared that she is a . She is getting acclimated to hospice services and what is available. Jace Martínez and pt reside together in their home and Jace Martínez acts as pt's primary caregiver. Pt can ambulate with assistance, but becomes very SOB and is a high fall risk. He does want to maintain independence and privacy as long as possible. Jace Martínez assists him to get up and use the restroom, and is concerned about leaving him though she is in need of a doctor's appt herself. Jace Martínez expressed feeling some occasional shortness of breath and trouble sleeping which she attributes to anxiety, and hopes with the support of her PCP she can try a pharamceutical intervention. This is not something she would typically seek out, but knows she has become hypervigilant when trying to rest - constantly listening out for any changes with her . We discussed the potential of a volunteer though they would not be able to provide support in the bathroom. LCSW inquired about any community/family help. Jace Martínez shared that their son-in-law lives across the street but that pt would likely feel uncomfortable with him assisting with this private task. LCSW suggested a urinal or bedside commode so that pt could be cared for just with supervision when Jace Martínez is out for an appt and she was receptive to this idea as pt will not ambulate except to use the restroom. LCSW requested RN bring out some urinals and sent the referral for volunteer services. LCSW will plan to f/u with another call to Ms. Velasquez April in the next 1-2 weeks as they settle into hospice care. She will be requesting a 1x week HHA as well to assist with bathing, but otherwise feels confident that the current needs are being met.

## 2022-02-09 NOTE — TELEPHONE ENCOUNTER
PSR reached out to patient to reschedule appointment scheduled 02/18 - provider out of office - left detailed VM of cancellation and to call back to schedule - please assist in scheduling when patient returrs call

## 2022-02-10 NOTE — HOSPICE
Patient resting in bed upon arrival for visit with daughter and wife present. Patient denies pain and endorses only mild SOB. Per wife, Calleen May, acetaminophen suppositories did not arrive in [facility]. Will order separately for delivery. Educated more on hospice orientation and answered family questions. Availability for respite at hospice house also reviewed with wife and daughter. Encouraged patient and family to call hospice with any needs or concerns 24/7.

## 2022-02-15 NOTE — HOSPICE
Patient lying in bed during SN visit with wife, Mela Foss present. Patient denies pain. Patient does have shortness of breath currently when talking, he is on 3L continuously. Patient has not needed morphine for SOB. The wife reports that he wants to ambulate by himself with his rollator to go the bathroom. Instructed patient that he needs his wife's assistance with walking to the bathroom due to SOB and at risk for falls. Patient verbalized understanding and stated that he will ask his wife for help. Discussed the option of moving the [facility] to the same room the patient is in. At this time the wife wants to keep the [facility] in the bathroom but understands that it may need to be moved to the same room since the patient is becoming weaker and more SOB. The patient becomes anxious at [facility] and has taken lorazepam as needed which allows him to sleep during the night. The wife stopped giving the patient lorazepam on 2/11 because on 2/12 that evening the patient reported his tongue being \"thick\". Upon assessment today tongue does not look swollen just dry. Informed the wife that if there was a reaction to the lorazepam it would have been noticed sooner. Informed the wife that it is okay to try the lorazepam again and just take half of a tablet tonight since the medication does help the patient sleep at night. Wife agreed with plan and will call hospice number if there are any reactions. Wife reports that the patient has not urinated since last night and she has concerns for urinary retention. Patient reports he has no urge to urinate at this time. He has also not been drinking as much fluid and his mouth is dry. Discussed option for garner if needed. Wife seems interested in this if needed. Reviewed medications. No refills needed. Ordered supplies through medline, confirmation number is: [id number]. No further needs or questions at this time. Reinforced hospice on call 24/7.

## 2022-02-22 NOTE — HOSPICE
Patient found lying back in his recliner chair. Patient denies c/o pain and becomes SOB while speaking. Patient requests his wife respond to most questions. Mrs. Dennis reports patient receives   Lorazepam 0.5mg tab 2 times daily and he has not received Morphine as of yet. Patient reports he transfers from recliner to bed, ambulating with his walker and assist of one. Mrs. Geovanny Rodriguez states patient is never left alone as he often reports episodes of \"the room tilting\", and he has increased weakness. Med rec completed and no refills needed at this time. Patient and his wife are encouraged to contact [phone] main number if his symptoms are uncontrolled or with questions or concerns.   wipes chux

## 2022-02-24 NOTE — HOSPICE
Patient received resting in bed with wife, Farideh Khanna, present. Patient denies pain but endorses 5/10 shortness of breath. Patient agreeable to trying morphine for shortness of breath. Patient reports improvement in shortness of breath about 20 minutes after RN administered. Patient initially with 100.7 temp but afebrile on repeat check prior to any antipyretic administration. Patient describes feeling like his \"quality of life is worse. \" RN provided active listening and discussed symptom management with patient and wife. RN also encouraged patient to try music therapy for social support as patient and wife report they used to frequent country music concerts. Patient agreeable to a music therapy visit. Patient had previously expressed interest in discontinuing sodium bicarbonate to reduce pill burden. Order received from Dr. Sharmaine Cole to discontinue. Orders also received from Dr. Sharmaine Cole to add Claritin 10mg PRN daily to [facility] for wife's report of patient's allergies. Patient's cat has been indoor/outdoor and laying in bed with him. Farideh Khanna reports that patient has utilized lorazepam PRN for anxiety typically at night. Patient reports imrpvoed appetite. Farideh Khanna attributes this to her daughter's good cooking and states she is providing many meals for the patient now. Encouraged Farideh Khanna to call hospice 24/7 with any needs or concerns. Bisacodyl and acetaminophen suppositories never arrived on admission.  Ordered through [facility] #[id number]    NEW MEDICATION INITIATION DOCUMENTATION:  Consulted AT MD to report change in patient status: YES  Obtained Order from Provider for initiation of Symptom relief medication / other medication needed:YES   Documentation completed in Telephone/Visit Note in [facility]  Reason medication is being initiated: allergies  MD / Provider name consulted re: change in status / initiation of new medication: Dr. Libby Lujan Symptom(s): allergies  New Order(s): Give one 10mg tab of Claritin daily as needed for allergies  Name of person being taught: Bee Scanlon  Instructions given: YES  Side Effects taught:YES  Response to teaching: verbalizes understanding

## 2022-03-01 NOTE — HOSPICE
Patient found lying back in his hospital bed awake and without c/o pain or SOB with continuous 02 @ 3LPM. Patient's breathing is even and nonlabored. He and his wife reports patient receives Morphine 0.25ml 1-4 times daily. Patient states the day he received four doses he was able to sleep great through the night.  and Mrs. Malika Maldonado deny having questions or concerns at this time. They are encouraged to contact [email or web address] main number 24/7 as needed.

## 2022-03-03 NOTE — H&P
[address] [facility] Help to Those in Need  [phone]    Patient Name: Zohreh Corrigan  YOB: 1930    Date of Provider Hospice Visit: 03/03/22    Level of Care:   [] General Inpatient (GIP)    [] Routine   [x] Respite    Current Location of Care:  [] [facility] [] [facility] [] [facility] [] [facility] [x] Hospice [facility]      Principle Hospice Diagnosis: end-stage cardiac disease       HOSPICE SUMMARY     Chart Reviewed for patient's medical history and hospice care plan. Hospice Physician Certification/Recertification Narrative per Trinidad Rome / nikole MD:   Patient was admitted to hospice services in February 2022 after a hospitalization related to acute respiratory failure and acute on chronic diastolic CHF exacerbation due to severe aortic stenosis. He and his wife elected hospice services. Also has diagnoses of T2DM, ulcerative colitis, and polymyalgia rheumatica. He has had symptom management with morphine and ativan, but has had worsening weakness. His wife has been caring for him around the clock and has not been sleeping more than 1 - 2 hours per night. Yesterday, he attempted to ambulate to the bathroom with the rollator but developed \"knee buckling\" and nearly fell backwards onto his wife. He is here at the [facility] for respite care. Constitutional: no acute distress, elderly  male  HEENT: dry mouth  CV: regular rate and rhythm  Pulm: unlabored  Abd: normal bowel sounds, soft, non-tender  : deferred  Skin: warm, dry; no breakdown noted  Neuro: alert and oriented, answering questions appropriately  Psych: mood and behavior normal       Patient being admitted for Respite care x 5 days for   [x]  Caregiver exhaustion and needing break  []  Caregiver unavailable       PLAN   1. Patient's home medications were reviewed and reconciled. Continue with current home medications and plan of care as outlined in chart.     2. Will evaluate home medications and possible discontinuation of some of these medications to reduce pill burden during respite stay. 3. Symptom management medications may be impacting his ability to ambulate safely; will evaluate and address decline in function and ongoing safety supports with patient and his wife as indicated. 4.  and SW to support family needs. 5. Disposition: Home with hospice once respite stay is completed.

## 2022-03-03 NOTE — HOSPICE
Patient received lying in bed; appeared comfortable and in no apparent distress. Denies any pain or shortness of breath when asked. Patient's wife states that she has been giving the patient morphine and lorazepam as needed to help with intermittent shortness of breath. Patient's wife, Lucia Robles, reported that patient has recently begun to experience difficulty with ambulation. Lucia Robles reports that last night, while attempting to assist patient back to bed from the bathroom, patient could not stand up from the toilet due to severe left knee pain. Lucia Robles was able to help the patient into a wheelchair and transport him back to the living room where patient's hospital bed is located. However, patient was unable to stand up from the wheelchair to transfer back to bed. Lucia Robles reported she was worried that the patient may fall and sustain an injury without further assistance, so she called patient's son-in-law to come and assist patient back to bed from the wheelchair. Lucia Robles also reported that patient had been experiencing some loose BMs a few times a day. RN suggested moving the bedside commode next to the bed, so that the patient only has to stand and pivot. However, the patient reported that the toilet seat on the bedside commode is too hard and is painful to sit on. As such, he preferred to continue getting up to the bathroom. RNs Joyce Ahumada) concerned for caregiver burnout, as the patient's wife does not have anyone to assist her in the home on a regular basis and patient is exhibiting worsening functional decline. She reports only receiving a few hours of sleep over the past few nights. RNs offered the hospice house for respite care. Patient initially hesitant about staying at [facility], as he \"doesn't want to give up the progress he's made. \" RNs provided education on the services [facility] offers and reiterated that Lucia Robles can visit him. Patient did eventually agree to try hospice house.  Order for respite received from Teodora Navarro NP. Report called to Ryan Quigley at [facility].    Medications reviewed with Jace Martínez at 10:50 am. Lorazepam tablet refill ordered via [facility] #[id number]

## 2022-03-03 NOTE — PROGRESS NOTES
1600: Patient is arrived and settled in bed with his wife at the bedside. Patient is alert and oriented x3. He is able to make his needs known without difficulty. He does seem to be forgetful and occasionally repeats himself. Dr. Zara Davis at the bedside. Patient reports to him that his knees \"buckle\" unexpectedly at times, and this is not a new problem, but he is becoming weaker in general which is complicating the issue. Last night he fell back into his wife as she was helping him to the bathroom with a walker. Patient is open to using a bedside commode and a urinal for safety. 1700: Patient eating his dinner with his wife at the bedside. 1715: Scheduled sulfasalazine administered. Patient completed 100% Of his dinner. He is able to take pills without difficulty. Patient requested an extra water bottle at the bedside  1830: Patient is resting in bed watching television.  No needs at this time  1900: Report given to [facility], [facility]

## 2022-03-03 NOTE — PROGRESS NOTES
Problem: Falls - Risk of  Goal: *Absence of Falls  Description: Document Ana Maria Coley Fall Risk and appropriate interventions in the flowsheet. Outcome: Progressing Towards Goal  Note: Fall Risk Interventions:            Medication Interventions: Patient to call before getting OOB,Bed/chair exit alarm    Elimination Interventions: Bed/chair exit alarm,Call light in reach,Patient to call for help with toileting needs    History of Falls Interventions: Bed/chair exit alarm,Door open when patient unattended         Problem: Patient Education: Go to Patient Education Activity  Goal: Patient/Family Education  Outcome: Progressing Towards Goal     Problem: Hospice Orientation  Goal: Demonstrate understanding of hospice philosophy, plan of care, and home hospice program  Description: The patient/family/caregiver will demonstrate understanding of hospice philosophy, plan of care and the home hospice program as evidenced by participation in meeting the patient's psychosocial, spiritual, medical, and physical needs inclusive of medical supplies/equipment focusing on symptoms. Outcome: Progressing Towards Goal     Problem: Potential for Alteration in Skin Integrity  Goal: Monitor skin for areas of alteration in skin integrity  Description: Patient/family/caregiver will demonstrate ability to care for patient's skin, monitor for areas of breakdown, and demonstrate methods to prevent breakdown during hospice care. Outcome: Progressing Towards Goal     Problem: Risk for Falls  Goal: Free of falls during inpatient stay  Description: Patient will be free of falls during inpatient stay. Outcome: Progressing Towards Goal     Problem: Alteration in Mobility  Goal: Remain as independent as possible and remain safe in environment  Description: Patient will remain as independent as possible and remain safe in their environment.   Outcome: Progressing Towards Goal     Problem: Pain  Goal: Assess satisfaction of level of comfort and symptom control  Outcome: Progressing Towards Goal  Goal: *Control of acute pain  Outcome: Progressing Towards Goal     Problem: Anxiety/Agitation  Goal: Verbalize or staff assess the ability to manage anxiety  Description: The patient/family/caregiver will verbalize and demonstrate ability to manage the patient's anxiety throughout hospice care. Outcome: Progressing Towards Goal     Problem: Communication Deficit  Goal: Effectively communicate symptoms, needs, and concerns  Description: Patient/family/caregiver will effectively communicate symptoms, needs and concerns. Outcome: Progressing Towards Goal     Problem: Coping and Emotional Distress  Goal: Demonstrate acceptance of terminal illness and understanding of disease progression  Description: Patient/family/caregiver will demonstrate acceptance of terminal disease and understanding of disease progression while employing appropriate coping mechanisms. Outcome: Progressing Towards Goal     Problem: Pressure Injury - Risk of  Goal: *Prevention of pressure injury  Outcome: Progressing Towards Goal  Note: Pressure Injury Interventions:             Activity Interventions: Pressure redistribution bed/mattress(bed type)         Nutrition Interventions: Offer support with meals,snacks and hydration

## 2022-03-04 NOTE — HOSPICE
PRN visit to perform covid test for patient transport to respite level of care at [facility]. Covid test resulted negative. Patient's wife Mini Burrell administered 0.5mg lorazepam tablet at approximately 1:45pm for anxiety related to transporting to hospice house. RN assisted patient to bathroom and returned to bed. Mini Burrell has patient's belongings with daily medications and DDNR packed for transport.  ETA for transport 3pm.

## 2022-03-04 NOTE — PROGRESS NOTES
Pharmacist Medication Review:  Patient's medications have been reviewed by the pharmacist: Yes  Patient's home medications have been re-ordered correctly for inpatient use at [facility]: Yes  Patient supplied Rx bottles were identified and noted to be within date: Yes, all but Timolol 0.5% ophthalmic drops - not identified with rest of patient supplied meds. Labels have been affixed to prescription bottles with initials of 2 nurses and 1 pharmacist: Yes, all were labelled with RN initials. Raciel Webster Pharm. D.   [address] [facility]

## 2022-03-04 NOTE — HOSPICE
LCSW met with pt and wife, Karla Menchaca at bedside. Pt received sitting up in hospital bed but completely asleep and did not rouse to voice or touch. He has not eaten so far today and had minimal fluid intake. Karla Bernarda shared that pt has become increasingly agitated even to the point of being aggressive at home. At [facility] today he was increasingly agitated as well and has required PRN doses of medications. Pt's respite stay is over on Tuesday and Karla Bernarda is becoming more aware that he will be difficult for her to continue to care for at home by herself. LCSW suggested that we continue to monitor this weekend and develop a plan based on our findings from the weekend assessment. Pt does seem to be declining and was also having difficulty swallowing at home. This may be indicative of beginning of transition, or pt may need to rest and will rally. LCSW will follow judit and plan to meet with . Cornelius De Guzman on Monday.

## 2022-03-04 NOTE — PROGRESS NOTES
Pharmacy Hospice Monitoring   Attending: Briseida Ford  Pharmacist monitoring provided for this 80year old male at [address] [facility] Diagnosis:  End stage cardiac disease  Level of care: Respite  Length of stay:  Day 1    Active Problem List: N/A    Current Facility Administered Medications:    Current Facility-Administered Medications:     timolol (TIMOPTIC) 0.5 % ophthalmic solution 1 Drop (Patient Supplied), 1 Drop, Both Eyes, BID, Jojo Garcia MD    latanoprost (XALATAN) 0.005 % ophthalmic solution 1 Drop (Patient Supplied), 1 Drop, Both Eyes, QHS, Jojo Garcia MD    bisacodyL (DULCOLAX) suppository 10 mg, 10 mg, Rectal, DAILY PRN, Jojo Garcia MD    morphine (ROXANOL) 100 mg/5 mL (20 mg/mL) concentrated solution 5 mg, 5 mg, Oral, F4G PRN, Jojo Garcia MD, 5 mg at 03/03/22 2009    LORazepam (INTENSOL) 2 mg/mL oral concentrate 0.5 mg, 0.5 mg, Oral, G2R PRN, Jojo Garcia MD, 0.5 mg at 03/04/22 0950    hyoscyamine SL (LEVSIN/SL) tablet 0.125 mg, 0.125 mg, SubLINGual, J4A PRN, Bobby Saldivar MD    prochlorperazine (COMPAZINE) tablet 10 mg, 10 mg, Oral, N1G PRN, Bobby Saldivar MD    ondansetron (ZOFRAN ODT) tablet 4 mg, 4 mg, Oral, O8N PRN, Jojo Garcia MD    haloperidol (HALDOL) 2 mg/mL oral solution 1 mg, 1 mg, Oral, H5Z PRN, Jojo Garcia MD    bumetanide (BUMEX) tablet 1 mg (Patient Supplied), 1 mg, Oral, DAILY, Jojo Garcia MD, 1 mg at 03/04/22 0811    amLODIPine (NORVASC) tablet 5 mg (Patient Supplied), 5 mg, Oral, DAILY, Jojo Garcia MD, 5 mg at 03/04/22 9919    aspirin chewable tablet 81 mg (Patient Supplied), 81 mg, Oral, DAILY, Jojo Garcia MD, 81 mg at 03/04/22 0810    benazepriL (LOTENSIN) tablet 40 mg (Patient Supplied), 40 mg, Oral, DAILY, Bobby Saldivar MD, 40 mg at 03/04/22 0811    carvedilol (COREG CR) CR capsule 10 mg (Patient Supplied), 10 mg, Oral, DAILY, Bobby Saldivar MD, 10 mg at 86/93/36 3075    folic acid (FOLVITE) tablet 1 mg (Patient Supplied), 1 mg, Oral, DAILY, Roseline Shannon MD, 1 mg at 03/04/22 8975    predniSONE (DELTASONE) tablet 1 mg (Patient Supplied), 1 mg, Oral, DAILY WITH BREAKFAST, Roseline Shannon MD, 1 mg at 03/04/22 2193    vit C,S-Uq-qlyqf-lutein-zeaxan (PRESERVISION AREDS-2) capsule 1 Capsule (Patient Supplied), 1 Capsule, Oral, BID, Roseline Shannon MD, 1 Capsule at 03/04/22 0810    sulfaSALAzine (AZULFIDINE) tablet 1,000 mg (Patient Supplied), 1,000 mg, Oral, BID WITH MEALS, Roseline Shannon MD, 1,000 mg at 03/04/22 0655    loratadine (CLARITIN) tablet 10 mg (Patient Supplied), 10 mg, Oral, DAILY, Cary Saldivar MD, 10 mg at 03/04/22 2872       Comments/Recommendations:   -Pharmacist will review patient's own medications to ensure appropriately ordered, in date, and labelled.  -Adequate pyxis inventory of prn oral Morphine and oral Lorazepam.      Kirill Singh.   [address]

## 2022-03-04 NOTE — HOSPICE
LCSW met with pt and wife, Charlette Cornejo at bedside. Pt received sitting up in hospital bed but completely asleep and did not rouse to voice or touch. He has not eaten so far today and had minimal fluid intake. Charlette Cornejo shared that pt has become increasingly agitated even to the point of being aggressive at home. At [facility] today he was increasingly agitated as well and has required PRN doses of medications. Pt's respite stay is over on Tuesday and Charlette Cornejo is becoming more aware that he will be difficult for her to continue to care for at home by herself. LCSW suggested that we continue to monitor this weekend and develop a plan based on our findings from the weekend assessment. Pt does seem to be declining and was also having difficulty swallowing at home. This may be indicative of beginning of transition, or pt may need to rest and will rally. LCSW will follow judit and plan to meet with Mrs. Graeme Casey on Monday.       SHAMAR Franco, [facility]   [phone]

## 2022-03-04 NOTE — HOSPICE
Problem Mr. Faisal De Oliveira had experienced a health change. At the time of the visit, Mr. Faisal De Oliveira was unable to verbally communicate. Goal is to provide emotional support to Mr and Ms. Faisal De Oliveira. The hospice chaplain visited Mr. Faisal De Oliveira during his respite stay at the [facility]. Ms. Faisal De Oliveira was also present. Ms. Faisal De Oliveira informed the hospice chaplain that her spouse was unable to speak and engage in conversation at that time. The hospice chaplain acknowledged Mr. Faisal De Oliveira' condition. The hospice chaplain offered to respond to the Mu's request to visit in the home. Ms. Joe Wolfe thanked the hospice chaplain for introducing self and verbalized understanding of the availablitly of chaplains' services.      The plan is for the hospice chaplain to respond to the family's request.

## 2022-03-04 NOTE — PROGRESS NOTES
0700: Received report from [facility], [address] [facility]. Patient resting in bed with eyes closed, appears sleeping. 0800: Patient assessment complete. Patient is awake and agitated. April Mandujano CNA, reports that she found patient had removed his oxygen partially and spo2 was initially 85%. Patient repeating \" I want my medic\"  But after some time, was able to find that patient is asking for his wife  [place]. Patient became more lucid and was able to communicate his needs more after a few more minutes in the room. He was reassured that his wife would be coming to visit soon and helped into a position of comfort in bed. Scheduled morning medications administered without difficulty and patient set up for breakfast  0945: Patient's wife is at the bedside, patient agitated and uncooperative with staff and his wife. PRN lorazepam 0.5mg SL administered. 1015: Patient sitting in bed with wife at the bedside. Attempted to help patient to get up to use urinal, but he changed his mind and layed back down. 1115: Patient is agitated and having difficulty finding the right words to express himself. He is also short of breath with talking. PRN lorazepam 0.5mg and morphine 5mg administered. Patient's wife shared that when he had his stroke in December that he also had these episodes but they have become more frequent in the last few days. Patient was a  by profession  65: Patient is drowsy, sitting in bed with his wife at the bedside. 1330: Patient is resting in bed with eyes partially open, appears sleeping. Respirations are regular depth and rhythm. Neutral facial position observed. 1415: Patient resting in bed with eyes partially open and mouth open, appears sleeping. Patient's wife at the bedside. 1515: Patient resting with eyes partially open. Wife is at the bedside. States that he has not woken up for her. 1615:  Patient resting with eyes partially open, appears sleeping.   1715: Patient resting with eyes partially open. Wife is at the bedside. States that he has not woken up. She is going to be leaving for the night. 1815: Patient is resting with eyes closed, remains sleeping.     1900: Report given to DESIREE Santa             NAME OF PATIENT:  Joselo Frias    LEVEL OF CARE:  Respite    REASON FOR GIP:   NA    *PATIENT REMAINS ELIGIBLE FOR [facility] LEVEL OF CARE AS EVIDENCED BY: (MUST BE ADDRESSED OF PATIENT GIP)      REASON FOR RESPITE:  Caregiver cannot care for patient due to:  Exhaustion    O2 SAFETY:  Concentrator positioning (6\" from furniture/drapes), No petroleum based products on face while oxygen in use and Oxygen sign on the door    FALL INTERVENTIONS PROVIDED:   Implemented/recommended resources for alarm system (personal alarm, bed alarm, call bell, etc.) , Implemented/recommended environmental changes (remove hazards, lower bed, improve lighting, etc.) and Implemented/recommended increased supervision/assistance    INTERDISPLINARY COMMUNICATION/COLLABORATION:  Physician, MSW, Sergio and RN, CNA    NEW MEDICATION INITIATION DOCUMENTATION:  Documentation completed in Clinical Note in [facility]    Reason medication is being initiated:  NA    MD / Provider name consulted re: change in status / initiation of new medication:  NA    New Symptom(s):  NA    New Order(s):  NA    Name of the person notified of the changes:  NA    Name of person being taught:  NA    Instructions given:  NA    Side Effects taught:  NA    Response to teaching:  NA      COMFORTABLE DYING MEASURE:  Is Patient/family satisfied with symptom level?  yes    DISCHARGE PLAN: Home when respite complete

## 2022-03-04 NOTE — PROGRESS NOTES
Problem: Falls - Risk of  Goal: *Absence of Falls  Description: Document Elise Garcia Fall Risk and appropriate interventions in the flowsheet. Outcome: Progressing Towards Goal  Note: Fall Risk Interventions:  Mobility Interventions: Bed/chair exit alarm,Communicate number of staff needed for ambulation/transfer,Patient to call before getting OOB,Utilize walker, cane, or other assistive device    Mentation Interventions: Adequate sleep, hydration, pain control,Bed/chair exit alarm,Door open when patient unattended,More frequent rounding    Medication Interventions: Bed/chair exit alarm,Patient to call before getting OOB    Elimination Interventions: Bed/chair exit alarm,Call light in reach,Toileting schedule/hourly rounds    History of Falls Interventions: Bed/chair exit alarm,Door open when patient unattended         Problem: Hospice Orientation  Goal: Demonstrate understanding of hospice philosophy, plan of care, and home hospice program  Description: The patient/family/caregiver will demonstrate understanding of hospice philosophy, plan of care and the home hospice program as evidenced by participation in meeting the patient's psychosocial, spiritual, medical, and physical needs inclusive of medical supplies/equipment focusing on symptoms. Outcome: Progressing Towards Goal     Problem: Potential for Alteration in Skin Integrity  Goal: Monitor skin for areas of alteration in skin integrity  Description: Patient/family/caregiver will demonstrate ability to care for patient's skin, monitor for areas of breakdown, and demonstrate methods to prevent breakdown during hospice care. Outcome: Progressing Towards Goal     Problem: Risk for Falls  Goal: Free of falls during inpatient stay  Description: Patient will be free of falls during inpatient stay.   Outcome: Progressing Towards Goal     Problem: Pain  Goal: Assess satisfaction of level of comfort and symptom control  Outcome: Progressing Towards Goal     Problem: Anxiety/Agitation  Goal: Verbalize or staff assess the ability to manage anxiety  Description: The patient/family/caregiver will verbalize and demonstrate ability to manage the patient's anxiety throughout hospice care. Outcome: Progressing Towards Goal     Problem: Pressure Injury - Risk of  Goal: *Prevention of pressure injury  Outcome: Progressing Towards Goal  Note: Pressure Injury Interventions:             Activity Interventions: Pressure redistribution bed/mattress(bed type),Increase time out of bed         Nutrition Interventions: Document food/fluid/supplement intake (4) no impairment

## 2022-03-04 NOTE — HOSPICE
1900 Report received from Rocky Nicolas into room by patient. Patient is ready for bed and would like the tv and lights off. Assisted patient with finding enough light in the room so he could. Patient requesting a flashlight. Explained to patient if I found a flashlight I would bring it to him. 2000 RN assessment completed. Patient denies any pain or need to use the bathroom. Assisted patient in making a phone call to his wife. Patient upset that he doesn't have a walker. Wife will bring his walker tomorrow. Administered prn sublingual lorazepam and morphine per patients usual nighttime routine. 2125 Patient resting quietly in bed with eyes closed, appears to be sleeping. 2315 Patient resting quietly with eyes closed. Neutral facial expression observed. 0050 Patient awake in bed. Requested a cup of coffee. 0200 Patient awake in bed, states he'll need to use the urinal in about an hour. 0300 Patient resting quietly with eyes closed. Appears to be sleeping.  0345 Assisted patient to stand and use the urinal. Patient unsteady on his feet. 0500 Patient resting quietly in bed with eyes closed. Appears to be sleeping. 1865 Patient in bed awake, would like to talk to his wife. Explained to patient that it's still early and she's probably sleeping. Patient agreeable to calling her in an hour.    0700 Report given to DESIREE Nicolas    NAME OF PATIENT:  Joselo Mayo [facility]    LEVEL OF CARE:  Respite    REASON FOR GIP:   n/a    *PATIENT REMAINS ELIGIBLE FOR GIP LEVEL OF CARE AS EVIDENCED BY: (MUST BE ADDRESSED OF PATIENT GIP)      REASON FOR RESPITE:  Caregiver exhaustion    O2 SAFETY:  n/a    FALL INTERVENTIONS PROVIDED:   Implemented/recommended use of non-skid footwear, Implemented/recommended use of fall risk identification flag to all team members, Implemented/recommended resources for alarm system (personal alarm, bed alarm, call bell, etc.) , Implemented/recommended environmental changes (remove hazards, lower bed, improve lighting, etc.) and Implemented/recommended increased supervision/assistance    INTERDISPLINARY COMMUNICATION/COLLABORATION:  Physician, MSW, [place] and RN, CNA    NEW MEDICATION INITIATION DOCUMENTATION:  n/a    Reason medication is being initiated:  n/a    MD / Provider name consulted re: change in status / initiation of new medication:  n/a    New Symptom(s):  n/a    New Order(s):  n/a    Name of the person notified of the changes:  n/a    Name of person being taught:  n/a    Instructions given:  n/a    Side Effects taught:  n/a    Response to teaching:  n/a      COMFORTABLE DYING MEASURE:  Is Patient/family satisfied with symptom level?  yes    DISCHARGE PLAN:  Home at end of respite stay.

## 2022-03-05 NOTE — PROGRESS NOTES
Problem: Falls - Risk of  Goal: *Absence of Falls  Description: Document Anam Kaur Fall Risk and appropriate interventions in the flowsheet. Outcome: Progressing Towards Goal  Note: Fall Risk Interventions:  Mobility Interventions: Bed/chair exit alarm    Mentation Interventions: Bed/chair exit alarm    Medication Interventions: Bed/chair exit alarm    Elimination Interventions: Bed/chair exit alarm,Call light in reach    History of Falls Interventions: Bed/chair exit alarm         Problem: Patient Education: Go to Patient Education Activity  Goal: Patient/Family Education  Outcome: Progressing Towards Goal     Problem: Hospice Orientation  Goal: Demonstrate understanding of hospice philosophy, plan of care, and home hospice program  Description: The patient/family/caregiver will demonstrate understanding of hospice philosophy, plan of care and the home hospice program as evidenced by participation in meeting the patient's psychosocial, spiritual, medical, and physical needs inclusive of medical supplies/equipment focusing on symptoms. Outcome: Progressing Towards Goal     Problem: Potential for Alteration in Skin Integrity  Goal: Monitor skin for areas of alteration in skin integrity  Description: Patient/family/caregiver will demonstrate ability to care for patient's skin, monitor for areas of breakdown, and demonstrate methods to prevent breakdown during hospice care. Outcome: Progressing Towards Goal     Problem: Risk for Falls  Goal: Free of falls during inpatient stay  Description: Patient will be free of falls during inpatient stay. Outcome: Progressing Towards Goal     Problem: Alteration in Mobility  Goal: Remain as independent as possible and remain safe in environment  Description: Patient will remain as independent as possible and remain safe in their environment.   Outcome: Progressing Towards Goal     Problem: Pain  Goal: Assess satisfaction of level of comfort and symptom control  Outcome: Progressing Towards Goal  Goal: *Control of acute pain  Outcome: Progressing Towards Goal     Problem: Anxiety/Agitation  Goal: Verbalize or staff assess the ability to manage anxiety  Description: The patient/family/caregiver will verbalize and demonstrate ability to manage the patient's anxiety throughout hospice care. Outcome: Progressing Towards Goal     Problem: Communication Deficit  Goal: Effectively communicate symptoms, needs, and concerns  Description: Patient/family/caregiver will effectively communicate symptoms, needs and concerns. Outcome: Progressing Towards Goal     Problem: Coping and Emotional Distress  Goal: Demonstrate acceptance of terminal illness and understanding of disease progression  Description: Patient/family/caregiver will demonstrate acceptance of terminal disease and understanding of disease progression while employing appropriate coping mechanisms. Outcome: Progressing Towards Goal     Problem: Pressure Injury - Risk of  Goal: *Prevention of pressure injury  Outcome: Progressing Towards Goal     Problem: Pressure Injury - Risk of  Goal: *Prevention of pressure injury  Description: Document Tulio Scale and appropriate interventions in the flowsheet.   Outcome: Progressing Towards Goal     Problem: Patient Education: Go to Patient Education Activity  Goal: Patient/Family Education  Outcome: Progressing Towards Goal

## 2022-03-05 NOTE — HOSPICE
1900 Report received from [address], RN assessment completed. Patient is lethargic, opens his eyes but does not speak. Neutral facial expression noted. 2100 Patient resting quietly with eyes closed, appears to be sleeping.  2205 Patient in bed with eyes open. Nods his head appropriately to questions. Neck pillow placed around his neck. 2245 Patient pulled up in bed. Incontinent of urine. Natalee care provided and brief changed. 0000 Patient resting quietly with eyes closed. Respirations unlabored. 0200 Patient resting quietly with eyes closed. Neutral facial expression observed. 0345 Patient resting quietly with eyes closed. Respirations unlabored. Occasional cough heard. 9853 Patient resting quietly with eye closed. Neutral facial expression observed. 4144 Patient incontinent of urine. Natalee care provided and brief changed. Repositioned onto right side.   0700 Report given to DESIREE Claudio    NAME OF PATIENT:  Joselo Bruno    LEVEL OF CARE:  Respite    REASON FOR GIP:   n/a    *PATIENT REMAINS ELIGIBLE FOR [facility] LEVEL OF CARE AS EVIDENCED BY: (MUST BE ADDRESSED OF PATIENT GIP)      REASON FOR RESPITE:  Caregiver exhaustion    O2 SAFETY:  Concentrator positioning (6\" from furniture/drapes), No petroleum based products on face while oxygen in use and Oxygen sign on the door    FALL INTERVENTIONS PROVIDED:   Implemented/recommended use of non-skid footwear, Implemented/recommended use of fall risk identification flag to all team members, Implemented/recommended assistive devices and encouraged their use, Implemented/recommended resources for alarm system (personal alarm, bed alarm, call bell, etc.) , Implemented/recommended environmental changes (remove hazards, lower bed, improve lighting, etc.) and Implemented/recommended increased supervision/assistance    INTERDISPLINARY COMMUNICATION/COLLABORATION:  Physician, MSW, Sergio and RN, CNA    NEW MEDICATION INITIATION DOCUMENTATION:  n/a    Reason medication is being initiated:  n/a    MD / Provider name consulted re: change in status / initiation of new medication:  n/a    New Symptom(s):  n/a    New Order(s):  n/a    Name of the person notified of the changes:  n/a    Name of person being taught:  n/a    Instructions given:  n/a    Side Effects taught:  n/a    Response to teaching:  n/a      COMFORTABLE DYING MEASURE:  Is Patient/family satisfied with symptom level?  yes    DISCHARGE PLAN:  Patient to return home at end of respite stay.

## 2022-03-05 NOTE — PROGRESS NOTES
0700: report received from [facility]  8722: pt resting quietly with eyes closed, respirations even and unlabored. Pt opened eyes to my voice but could not verbalize anything. Appeared that he was trying to say something, but unable to form the words. Full assessment done, see flowsheets. 0745: wife to bedside, pt awake but unable to speak. 0830: wife attempting to feed patient some breakfast, patient not interested in any food or drink. 0840: wife out to nurses station, states patient is starting to get a bit agitated, removed his oxygen cannula. PRN lorazepam given per MAR. Will continue to monitor. Morning PO meds held as patient is not eating or drinking anything currently. 0930: pt resting quietly with eyes closed, neutral facial expression, relaxed body. Wife reports he has been resting comfortably since the lorazepam, however will not leave his oxygen on. RN reassured wife that as long as he is resting comfortably it is ok if he doesn't want the oxygen on for right now. 1115: pt resting quietly with eyes closed, wife reports he has been comfortable. RN placed oxygen cannula back in his nose. 1205: pt resting with eyes closed. Wife reports he has been intermittently fidgety and moaning as if uncomfortable. PRN dose of SL lorazepam and morphine given per MAR. Will continue to monitor. 1400: pt resting quietly with eyes closed, respirations shallow. Wife remains at bedside, states he has remained comfortable with an occasional deep sighing breath. 1600: wife leaving for the evening. pt given full bed bath by CNA and RN. Pt tolerated well. Unable to verbalize any words when asked questions. Gripped my hand when asked L hand > R hand. 1745: pt resting quietly with eyes closed, respirations even and unlabored. Will open eyes slightly to my voice but no verbalization. PO meds held d/t patient not being awake and alert enough. Pt has not had anything to eat or drink today.    1900: report given to oncoming nurse      NAME OF PATIENT:  Joselo Lainez    LEVEL OF CARE:  respite    REASON FOR GIP:   n/a    *PATIENT REMAINS ELIGIBLE FOR GIP LEVEL OF CARE AS EVIDENCED BY: (MUST BE ADDRESSED OF PATIENT GIP)      REASON FOR RESPITE:  Caregiver cannot care for patient due to:  exhaustion    O2 SAFETY:  Concentrator positioning (6\" from furniture/drapes), Tanks stored in reyez , No petroleum based products on face while oxygen in use and Oxygen sign on the door    FALL INTERVENTIONS PROVIDED:   Implemented/recommended use of non-skid footwear, Implemented/recommended use of fall risk identification flag to all team members, Implemented/recommended assistive devices and encouraged their use, Implemented/recommended resources for alarm system (personal alarm, bed alarm, call bell, etc.) , Implemented/recommended environmental changes (remove hazards, lower bed, improve lighting, etc.) and Implemented/recommended increased supervision/assistance    INTERDISPLINARY COMMUNICATION/COLLABORATION:  Physician, MSW, [place] and RN, CNA    NEW MEDICATION INITIATION DOCUMENTATION:  n/a    Reason medication is being initiated:  n/a    MD / Provider name consulted re: change in status / initiation of new medication:  n/a    New Symptom(s):  n/a    New Order(s):  n/a    Name of the person notified of the changes:  n/a    Name of person being taught:  n/a    Instructions given:  n/a    Side Effects taught:  n/a    Response to teaching:  n/a      COMFORTABLE DYING MEASURE:  Is Patient/family satisfied with symptom level?  yes    DISCHARGE PLAN:  Home at the completion of respite stay and continue to be followed by home hospice.

## 2022-03-05 NOTE — PROGRESS NOTES
Problem: Falls - Risk of  Goal: *Absence of Falls  Description: Document [place] Fall Risk and appropriate interventions in the flowsheet. Outcome: Progressing Towards Goal  Note: Fall Risk Interventions:  Mobility Interventions: Bed/chair exit alarm,Patient to call before getting OOB,Utilize gait belt for transfers/ambulation    Mentation Interventions: Adequate sleep, hydration, pain control,Bed/chair exit alarm,Door open when patient unattended,More frequent rounding    Medication Interventions: Bed/chair exit alarm,Patient to call before getting OOB    Elimination Interventions: Bed/chair exit alarm,Call light in reach,Toileting schedule/hourly rounds    History of Falls Interventions: Bed/chair exit alarm,Door open when patient unattended,Room close to nurse's station         Problem: Potential for Alteration in Skin Integrity  Goal: Monitor skin for areas of alteration in skin integrity  Description: Patient/family/caregiver will demonstrate ability to care for patient's skin, monitor for areas of breakdown, and demonstrate methods to prevent breakdown during hospice care. Outcome: Progressing Towards Goal     Problem: Risk for Falls  Goal: Free of falls during inpatient stay  Description: Patient will be free of falls during inpatient stay. Outcome: Progressing Towards Goal     Problem: Pain  Goal: Assess satisfaction of level of comfort and symptom control  Outcome: Progressing Towards Goal     Problem: Anxiety/Agitation  Goal: Verbalize or staff assess the ability to manage anxiety  Description: The patient/family/caregiver will verbalize and demonstrate ability to manage the patient's anxiety throughout hospice care.   Outcome: Progressing Towards Goal     Problem: Pressure Injury - Risk of  Goal: *Prevention of pressure injury  Outcome: Progressing Towards Goal  Note: Pressure Injury Interventions:  Sensory Interventions: Assess changes in LOC,Assess need for specialty bed,Check visual cues for pain,Float heels,Minimize linen layers    Moisture Interventions: Absorbent underpads,Apply protective barrier, creams and emollients,Maintain skin hydration (lotion/cream)    Activity Interventions: Pressure redistribution bed/mattress(bed type)    Mobility Interventions: Assess need for specialty bed,Float heels,HOB 30 degrees or less,Pressure redistribution bed/mattress (bed type)    Nutrition Interventions: Document food/fluid/supplement intake,Offer support with meals,snacks and hydration    Friction and Shear Interventions: Apply protective barrier, creams and emollients,HOB 30 degrees or less,Lift sheet,Minimize layers

## 2022-03-06 NOTE — PROGRESS NOTES
Problem: Falls - Risk of  Goal: *Absence of Falls  Description: Document [place] Litter Fall Risk and appropriate interventions in the flowsheet. Outcome: Progressing Towards Goal  Note: Fall Risk Interventions:  Mobility Interventions: Bed/chair exit alarm    Mentation Interventions: Adequate sleep, hydration, pain control,Bed/chair exit alarm    Medication Interventions: Bed/chair exit alarm    Elimination Interventions: Bed/chair exit alarm    History of Falls Interventions: Bed/chair exit alarm         Problem: Patient Education: Go to Patient Education Activity  Goal: Patient/Family Education  Outcome: Progressing Towards Goal     Problem: Hospice Orientation  Goal: Demonstrate understanding of hospice philosophy, plan of care, and home hospice program  Description: The patient/family/caregiver will demonstrate understanding of hospice philosophy, plan of care and the home hospice program as evidenced by participation in meeting the patient's psychosocial, spiritual, medical, and physical needs inclusive of medical supplies/equipment focusing on symptoms. Outcome: Progressing Towards Goal     Problem: Potential for Alteration in Skin Integrity  Goal: Monitor skin for areas of alteration in skin integrity  Description: Patient/family/caregiver will demonstrate ability to care for patient's skin, monitor for areas of breakdown, and demonstrate methods to prevent breakdown during hospice care. Outcome: Progressing Towards Goal     Problem: Risk for Falls  Goal: Free of falls during inpatient stay  Description: Patient will be free of falls during inpatient stay. Outcome: Progressing Towards Goal     Problem: Alteration in Mobility  Goal: Remain as independent as possible and remain safe in environment  Description: Patient will remain as independent as possible and remain safe in their environment.   Outcome: Progressing Towards Goal     Problem: Pain  Goal: Assess satisfaction of level of comfort and symptom control  Outcome: Progressing Towards Goal  Goal: *Control of acute pain  Outcome: Progressing Towards Goal     Problem: Anxiety/Agitation  Goal: Verbalize or staff assess the ability to manage anxiety  Description: The patient/family/caregiver will verbalize and demonstrate ability to manage the patient's anxiety throughout hospice care. Outcome: Progressing Towards Goal     Problem: Communication Deficit  Goal: Effectively communicate symptoms, needs, and concerns  Description: Patient/family/caregiver will effectively communicate symptoms, needs and concerns. Outcome: Progressing Towards Goal     Problem: Coping and Emotional Distress  Goal: Demonstrate acceptance of terminal illness and understanding of disease progression  Description: Patient/family/caregiver will demonstrate acceptance of terminal disease and understanding of disease progression while employing appropriate coping mechanisms. Outcome: Progressing Towards Goal     Problem: Pressure Injury - Risk of  Goal: *Prevention of pressure injury  Outcome: Progressing Towards Goal  Note: Pressure Injury Interventions:  Sensory Interventions: Assess changes in LOC,Check visual cues for pain,Float heels,Keep linens dry and wrinkle-free,Minimize linen layers,Pad between skin to skin,Pressure redistribution bed/mattress (bed type),Turn and reposition approx. every two hours (pillows and wedges if needed)    Moisture Interventions: Absorbent underpads,Check for incontinence Q2 hours and as needed,Maintain skin hydration (lotion/cream),Minimize layers,Moisture barrier    Activity Interventions: Pressure redistribution bed/mattress(bed type)    Mobility Interventions: Float heels,HOB 30 degrees or less,Pressure redistribution bed/mattress (bed type),Turn and reposition approx.  every two hours(pillow and wedges)    Nutrition Interventions: Document food/fluid/supplement intake,Offer support with meals,snacks and hydration    Friction and Shear Interventions: Apply protective barrier, creams and emollients,HOB 30 degrees or less,Lift sheet,Minimize layers                Problem: Pressure Injury - Risk of  Goal: *Prevention of pressure injury  Description: Document Tulio Scale and appropriate interventions in the flowsheet. Outcome: Progressing Towards Goal  Note: Pressure Injury Interventions:  Sensory Interventions: Assess changes in LOC,Check visual cues for pain,Float heels,Keep linens dry and wrinkle-free,Minimize linen layers,Pad between skin to skin,Pressure redistribution bed/mattress (bed type),Turn and reposition approx. every two hours (pillows and wedges if needed)    Moisture Interventions: Absorbent underpads,Check for incontinence Q2 hours and as needed,Maintain skin hydration (lotion/cream),Minimize layers,Moisture barrier    Activity Interventions: Pressure redistribution bed/mattress(bed type)    Mobility Interventions: Float heels,HOB 30 degrees or less,Pressure redistribution bed/mattress (bed type),Turn and reposition approx.  every two hours(pillow and wedges)    Nutrition Interventions: Document food/fluid/supplement intake,Offer support with meals,snacks and hydration    Friction and Shear Interventions: Apply protective barrier, creams and emollients,HOB 30 degrees or less,Lift sheet,Minimize layers                Problem: Patient Education: Go to Patient Education Activity  Goal: Patient/Family Education  Outcome: Progressing Towards Goal

## 2022-03-06 NOTE — PROGRESS NOTES
Problem: Anxiety/Agitation  Goal: Verbalize or staff assess the ability to manage anxiety  Description: The patient/family/caregiver will verbalize and demonstrate ability to manage the patient's anxiety throughout hospice care.   Outcome: Progressing Towards Goal

## 2022-03-06 NOTE — PROGRESS NOTES
1900 Report received from [place], [address] [place] [address]. Pt is Respite level of care for caregiver breakdown. Dx End Stage Heart Disease. 2030 Pt is resting quietly in bed with eyes closed. Opens eyes to name call and tracks. He does not attempt to speak. Depends is dry. Repositioned to right side. No signs of pain or agitation. Oxygen canula found off and replaced. 2200 Resting quietly, appears to be sleeping. 0000 Repositioned to left side. Opens eyes and focuses. Non verbal. Right side is flaccid, left side weak. 0200 Resting quietly. Appears to be sleeping.  0400 No facial grimace or signs of discomfort. Appears to be sleeping. 0530 Incontinent of urine. Skin care given. Depends changed. Repositioned to his left side. 0700 Report to oncoming nurse.         NAME OF PATIENT:  Joselo Lainez    LEVEL OF CARE:  Respite    REASON FOR GIP:   na    *PATIENT REMAINS ELIGIBLE FOR [facility] LEVEL OF CARE AS EVIDENCED BY: na      REASON FOR RESPITE:  Caregiver cannot care for patient due to:  care giver exhaustion    O2 SAFETY:  Concentrator positioning (6\" from furniture/drapes), No petroleum based products on face while oxygen in use and Oxygen sign on the door    FALL INTERVENTIONS PROVIDED:   Implemented/recommended use of fall risk identification flag to all team members, Implemented/recommended resources for alarm system (personal alarm, bed alarm, call bell, etc.) , Implemented/recommended environmental changes (remove hazards, lower bed, improve lighting, etc.) and Implemented/recommended increased supervision/assistance    INTERDISPLINARY COMMUNICATION/COLLABORATION:  Physician, MSW, Sergio and RN, CNA    NEW MEDICATION INITIATION DOCUMENTATION:  na    Reason medication is being initiated:  zachery    MD / Provider name consulted re: change in status / initiation of new medication:  na    New Symptom(s):  na    New Order(s):  na    Name of the person notified of the changes:  na    Name of person being taught:  na    Instructions given: na    Side Effects taught:  na    Response to teaching:  na      COMFORTABLE DYING MEASURE:  Is Patient/family satisfied with symptom level? Yes    DISCHARGE PLAN:  Return home to family when Respite stay is complete.

## 2022-03-06 NOTE — PROGRESS NOTES
[facility]  [phone]    Patient Name: Owyhee Mention  YOB: 1930    Date of Provider Hospice Visit: 03/06/22    Level of Care:   [x] General Inpatient (GIP)    [] Routine   [] Respite    Current Location of Care:  [] [facility] [] [facility] [] [facility] [] [facility] [] Hospice House [facility]    IF [facility], patient referred from:  [] [facility] [] [facility] [] [facility] [] [facility] [x] Home [] Other:           Danya Flores is a 80y.o. year old who was changed to General In Level of Care. Functionally, the patient's Karnofsky and/or Palliative Performance Scale has declined over a period of days to weeks and is estimated at 10%. The patient is dependent for all ADLs. The patient/family chose comfort measures with the support of Hospice. HOSPICE DIAGNOSES   Active Symptoms:  1. Shortness of breath  2. Anxiety  3. Agitation  4. Airway secretions  5. Weakness and fatigue     PLAN   1. Admit pt to [facility] level of care. PT has overwhelming symptoms which cannot be managed at home. Patient requires frequent assessment and IV medications. Pt is high risk for sudden decline. 2. Pt medications to include: all oral meds discontinued  3. Give morphine 2 mg IV Q 4 hrs scheduled and every 15 min as needed  4. Give ativan 1 mg Q 4 hrs scheduled and every 15 min as needed  5. Discussion with spouse, she is in agreement  6. Discussed with Young Trevino and Hospice team     7.  and SW to support family needs  8. Disposition:  pt is not safe for transport  9. Hospice Plan of care was reviewed in detail and agree with current plan of care    Prognosis estimated based on 03/06/22 clinical assessment is:   [x] Hours to Days    [] Days to Weeks    [] Other:    Communicated plan of care with: Hospice Case Manager;  Hospice IDT; Care Team     GOALS OF CARE     Patient/Medical POA stated Goal of Care: comfort care    [] I have reviewed and/or updated ACP information in the Advance Care Planning Navigator. This information is available in the [address] link in the patient's chart header. Pt does not have ADLW document    X Primary Decision [facility] ([address]):   Primary Decision Maker: Aba Shaffer - [phone]    Secondary Decision Maker: Tyra Wing - [phone]    Resuscitation Status: DNR  If DNR is there a Durable DNR on file? : [] Yes [] No (If no, complete Durable DNR)    HISTORY     History obtained from: chart, Hospice Team, nursing staff    CHIEF COMPLAINT: pt is minimally responsive  The patient is:   [] Verbal  [x] Nonverbal  [] Unresponsive       Patient was admitted to hospice services in February 2022 after a hospitalization related to acute respiratory failure and acute on chronic diastolic CHF exacerbation due to severe aortic stenosis. He and his wife elected hospice services. Also has diagnoses of T2DM, ulcerative colitis, and polymyalgia rheumatica. He has had symptom management with morphine and ativan, but has had worsening weakness. His wife has been caring for him around the clock and has not been sleeping more than 1 - 2 hours per night. Yesterday, he attempted to ambulate to the bathroom with the rollator but developed \"knee buckling\" and nearly fell backwards onto his wife. He is here at the [facility] for respite care. The patient's principle diagnosis has resulted in  Obtundation, hand tremors, agitation, verbal indicators of pain, difficulty breathing, shortness of breath.           REVIEW OF SYSTEMS     The following systems were: [] reviewed  [x] unable to be reviewed    Positive ROS include:  Constitutional: fatigue, weakness, in pain, short of breath  Ears/nose/mouth/throat: increased airway secretions  Respiratory:shortness of breath, wheezing  Gastrointestinal:poor appetite, nausea, vomiting, abdominal pain, constipation, diarrhea  Musculoskeletal:pain, deformities, swelling legs  Neurologic:confusion, hallucinations, weakness  Psychiatric:anxiety, feeling depressed, poor sleep  Endocrine:     Adult Non-Verbal Pain Assessment Score: in the past 24 hrs: Face  [] 0   No particular expression or smile  [] 1   Occasional grimace, tearing, frowning, wrinkled forehead  [x] 2   Frequent grimace, tearing, frowning, wrinkled forehead    Activity (movement)  [] 0   Lying quietly, normal position  [x] 1   Seeking attention through movement or slow, cautious movement  [] 2   Restless, excessive activity and/or withdrawal reflexes    Guarding  [] 0   Lying quietly, no positioning of hands over areas of body  [x] 1   Splinting areas of the body, tense  [] 2   Rigid, stiff    Physiology (vital signs)  [] 0   Stable vital signs  [x] 1   Change in any of the following: SBP > 20mm Hg; HR > 20/minute  [] 2   Change in any of the following: SBP > 30mm Hg; HR > 25/minute    Respiratory  [x] 0   Baseline RR/SpO2, compliant with ventilator  [] 1   RR > 10 above baseline, or 5% drop SpO2, mild asynchrony with ventilator  [] 2   RR > 20 above baseline, or 10% drop SpO2, asynchrony with ventilator     FUNCTIONAL ASSESSMENT     Palliative Performance Scale (PPS): 10-20%       PSYCHOSOCIAL/SPIRITUAL ASSESSMENT     Active Problems:    * No active hospital problems.  *    Past Medical History:   Diagnosis Date    Arrhythmia     a fib on occasion    Arthritis     Autoimmune disease ([facility]     Questionable Fibromyalgia    Cancer ([address]     basal cell on nose, ear and cheek    Chronic pain     Diabetes ([address]     Hypertension     Other and unspecified hyperlipidemia 4/7/2013    Other ill-defined conditions(799.89)     bilateral glaucoma    Other ill-defined conditions(799.89)     UC    Sleep apnea     Stool color black     Stroke [facility]) 11/2010 4/13, 6/15  TIA    UC (ulcerative colitis) ([address] 10/14/2009    Unspecified sleep apnea     on bipap      Past Surgical History:   Procedure Laterality Date    HX COLONOSCOPY  12/2014    HX ORTHOPAEDIC left knee arthroscopy      Social History     Tobacco Use    Smoking status: Never Smoker    Smokeless tobacco: Never Used   Substance Use Topics    Alcohol use: No     Family History   Problem Relation Age of Onset    Stroke Mother     Dementia Mother     Cancer Father         pancreatic    Stroke Brother     Cancer Brother     Stroke Brother       Allergies   Allergen Reactions    Statins-Hmg-Coa Reductase Inhibitors Myalgia      Current Facility-Administered Medications   Medication Dose Route Frequency    LORazepam (INTENSOL) 2 mg/mL oral concentrate 1 mg  1 mg Oral Q1H PRN    timolol (TIMOPTIC) 0.5 % ophthalmic solution 1 Drop (Patient Supplied)  1 Drop Both Eyes BID    latanoprost (XALATAN) 0.005 % ophthalmic solution 1 Drop (Patient Supplied)  1 Drop Both Eyes QHS    bisacodyL (DULCOLAX) suppository 10 mg  10 mg Rectal DAILY PRN    morphine (ROXANOL) 100 mg/5 mL (20 mg/mL) concentrated solution 5 mg  5 mg Oral Q1H PRN    hyoscyamine SL (LEVSIN/SL) tablet 0.125 mg  0.125 mg SubLINGual Q4H PRN    prochlorperazine (COMPAZINE) tablet 10 mg  10 mg Oral Q6H PRN    ondansetron (ZOFRAN ODT) tablet 4 mg  4 mg Oral Q4H PRN    haloperidol (HALDOL) 2 mg/mL oral solution 1 mg  1 mg Oral Q6H PRN    bumetanide (BUMEX) tablet 1 mg (Patient Supplied)  1 mg Oral DAILY    amLODIPine (NORVASC) tablet 5 mg (Patient Supplied)  5 mg Oral DAILY    aspirin chewable tablet 81 mg (Patient Supplied)  81 mg Oral DAILY    benazepriL (LOTENSIN) tablet 40 mg (Patient Supplied)  40 mg Oral DAILY    carvedilol (COREG CR) CR capsule 10 mg (Patient Supplied)  10 mg Oral DAILY    folic acid (FOLVITE) tablet 1 mg (Patient Supplied)  1 mg Oral DAILY    predniSONE (DELTASONE) tablet 1 mg (Patient Supplied)  1 mg Oral DAILY WITH BREAKFAST    vit C,J-Vm-rrnsx-lutein-zeaxan (PRESERVISION AREDS-2) capsule 1 Capsule (Patient Supplied)  1 Capsule Oral BID    sulfaSALAzine (AZULFIDINE) tablet 1,000 mg (Patient Supplied) 1,000 mg Oral BID WITH MEALS    loratadine (CLARITIN) tablet 10 mg (Patient Supplied)  10 mg Oral DAILY        PHYSICAL EXAM     Wt Readings from Last 3 Encounters:   02/04/22 82.1 kg (181 lb)   01/31/22 82.1 kg (181 lb)   01/14/22 85.3 kg (188 lb)       Visit Vitals  BP (!) 108/57   Pulse (!) 108   Temp 99.5 °F (37.5 °C)   Resp 30   SpO2 99%       Supplemental O2  [x] Yes  [] NO       Currently this patient has:  [x] Peripheral IV [] PICC  [] PORT [] ICD    [x] Bah Catheter [] NG Tube   [] PEG Tube    [] Rectal Tube [] Drain  [] Other:     Constitutional: minimally responsive  Eyes: pupils equal, anicteric  ENMT: +  airway secretions  Cardiovascular: regular rhythm, distal pulses intact  Respiratory: breathing mildly labored, symmetric  Gastrointestinal: soft non-tender, +bowel sounds  Musculoskeletal: no deformity, no tenderness to palpation  Skin: warm, dry, pale  Neurologic:pt is not able to follow commands, pt Is not moving all extremities  Psychiatric: NA      Pertinent Lab and or Imaging Tests:  Lab Results   Component Value Date/Time    Sodium 131 (L) 02/04/2022 02:06 AM    Potassium 4.2 02/04/2022 02:06 AM    Chloride 97 02/04/2022 02:06 AM    CO2 24 02/04/2022 02:06 AM    Anion gap 10 02/04/2022 02:06 AM    Glucose 178 (H) 02/04/2022 02:06 AM    BUN 34 (H) 02/04/2022 02:06 AM    Creatinine 1.41 (H) 02/04/2022 02:06 AM    BUN/Creatinine ratio 24 (H) 02/04/2022 02:06 AM    GFR est AA 57 (L) 02/04/2022 02:06 AM    GFR est non-AA 47 (L) 02/04/2022 02:06 AM    Calcium 9.1 02/04/2022 02:06 AM     Lab Results   Component Value Date/Time    Protein, total 7.0 02/03/2022 11:29 AM    Albumin 2.9 (L) 02/03/2022 11:29 AM            Emelia Fox, ROLAND

## 2022-03-06 NOTE — PROGRESS NOTES
0700: report received from [address]: full assessment done, see flow sheets. Pt awake, wife at bedside, holding wife's hand, nods his head yes when asked if something hurts. His right arm is noted to be shaking. PRN dose of lorazepam and morphine given per MAR. Pt follows commands of opening mouth and swallowing when asked. Attempted to have him sip some water but he did not have the strength to suck through the straw. Water dripped into his mouth by RN and patient able to swallow. Then gave patient an ice chip which he seemed to enjoy. RN left ice chips at bedside with wife. 0095: pt resting quietly, opens his eyes to voice, no verbalization. R arm has not been shaking anymore, wife reports he has been resting comfortably, has not wanted anymore ice chips. 0930: rounds with Cy De La Cruz NP. Updated on pts decline over the last 2 days. At bedside, pts R arm noted to be starting to shake again. NP ordering increase in SL ativan to 1mg q1 hr. Will keep NP updated if patient requiring frequent interventions or not tolerating SL meds to assess LOC. Wife at bedside, updated on plans and changes. Wife verbalizes understanding. 0123: SL lorazepam PRN given per [facility] for restlessness and arm shaking. RN educated wife on monitoring for med effectiveness. 1000: wife rang call bell, stated patient coughed and seemed like he was choking. Nodded his head Yes when she asked him if it hurt to breathe. PRN dose of SL morphine given. Pt able to swallow small volume very slowly. 1015: RN spoke with NP Cy De La Cruz, pt is declining and requiring frequent nursing interventions. Pt will be switched to GIP LOC. Wife updated on LOC change. 1040: Subcut lines placed in the back of both upper arms. RN provided education to wife on access and medication dosages and schedules. Wife verbalizes understanding.  pts R arm continues to shake, pt intermittently reaching with his L hand up to his chest and face, and attempting to take off oxygen. 1046: scheduled meds given per [facility] to manage symptoms. Will continue to closely monitor. 1149: PRN dose of morphine and lorazepam given per MAR, pt continues having R arm shaking and intermittently loudly moaning, arms are tense and brow is furrowed. 1230: pt resting with eyes closed, snoring. Wife remains at bedside, holding patients hand. 1318: pt resting with eyes closed, intermittently has a wet cough and difficulty swallowing the phlegm. PRN dose of robinul given per [facility]. Provided med education to wife. Verbalized understanding. 1400: pt resting quietly with eyes closed, respirations are even and unlabored. 1500: scheduled meds given per MAR. Pt is resting quietly with eyes closed, respirations even and unlabored. Pt coughed x1 while in the room but able to clear secretion. 1615: pt resting quietly with eyes closed, respirations even and unlabored. Wife states she is going to go home and shower and then return to spend the night. 1715: CNA giving pt full bed bath. Pt tolerated well. Pt had a dry brief. Pt repositioned to R side and propped with pillows for comfort. 1800: pt resting quietly with eyes closed, respirations are even and unlabored. Pt opens eyes slightly to my voice. 1849: scheduled meds given per MAR. Pt resting quietly with eyes closed. Wife has returned to bedside, pt did not squeeze her hand like he had earlier in the day. pts R arm continues to intermittently tremor. 1900: report given to oncoming nurse. NAME OF PATIENT:  Joselo Lainez    LEVEL OF CARE:  Respite to GIP    REASON FOR GIP:   Medication adjustment that must be monitored 24/7 and Stabilizing treatment that cannot take place at home    *PATIENT REMAINS ELIGIBLE FOR GIP LEVEL OF CARE AS EVIDENCED BY: (MUST BE ADDRESSED OF PATIENT GIP)  Pt is requiring frequent nursing assessments to monitor symptoms and the initiation of IV meds to manage symptoms unrelieved by SL meds.      REASON FOR RESPITE:  Caregiver cannot care for patient due to:  exhaustion    O2 SAFETY:  Concentrator positioning (6\" from furniture/drapes), Tanks stored in reyez , No petroleum based products on face while oxygen in use and Oxygen sign on the door    FALL INTERVENTIONS PROVIDED:   Implemented/recommended use of non-skid footwear, Implemented/recommended use of fall risk identification flag to all team members, Implemented/recommended assistive devices and encouraged their use, Implemented/recommended resources for alarm system (personal alarm, bed alarm, call bell, etc.) , Implemented/recommended environmental changes (remove hazards, lower bed, improve lighting, etc.) and Implemented/recommended increased supervision/assistance    INTERDISPLINARY COMMUNICATION/COLLABORATION:  Physician, MSW, [place]e and RN, CNA    NEW MEDICATION INITIATION DOCUMENTATION:  Consulted AT MD to report change in pt status, Obtained Order from Provider for initiation of symptom relief medication /other medication needed and Documentation completed in Clinical Note in [facility]    Reason medication is being initiated:  Restlessness, pain, SOB    MD / Provider name consulted re: change in status / initiation of new medication:  ROLAND Whyte    New Symptom(s):  Restlessness, pain, SOB unrelieved by SL meds    New Order(s):  1mg ativan IV q4 scheduled and q15min PRN; 2mg morphine IV q4 scheduled and q15min PRN. Name of the person notified of the changes:  Tasha Burrell    Name of person being taught:  Tasha Burrell    Instructions given:  yes    Side Effects taught:  yes    Response to teaching:  Verbalized understanding      COMFORTABLE DYING MEASURE:  Is Patient/family satisfied with symptom level?  yes    DISCHARGE PLAN:  EOL at [facility] or if symptoms should stabilize, pt will return home and continue to be followed by home hospice.

## 2022-03-06 NOTE — PROGRESS NOTES
Pt has had a change in condition:  Changed to [facility] level of care  Minimally responsive, opens eyes to voice   Not moving extremities  The  right arm with tremors  Unable to follow commands or squeeze his hands    Plan: DC most medications except comfort meds  May need IV meds soon and level of care change to [facility]        Constitutional: minimally responsive   Eyes: pupils equal, anicteric  ENMT: no nasal discharge, moist mucous membranes  Cardiovascular: regular rhythm, distal pulses diminished  Respiratory: breathing becoming labored, symmetric  Gastrointestinal: soft non-tender, +bowel sounds  Musculoskeletal: tremors to right arm, no tenderness to palpation  Skin: warm, dry  Neurologic:pt is/ not able to follow commands, pt is/ not moving all extremities  Psychiatric:BARRETT Muller, NP

## 2022-03-07 NOTE — PROGRESS NOTES
Problem: Falls - Risk of  Goal: *Absence of Falls  Description: Document Clearence Skates Fall Risk and appropriate interventions in the flowsheet. Outcome: Progressing Towards Goal  Note: Fall Risk Interventions:  Mobility Interventions: Bed/chair exit alarm    Mentation Interventions: Bed/chair exit alarm    Medication Interventions: Bed/chair exit alarm    Elimination Interventions: Bed/chair exit alarm    History of Falls Interventions: Bed/chair exit alarm         Problem: Patient Education: Go to Patient Education Activity  Goal: Patient/Family Education  Outcome: Progressing Towards Goal     Problem: Hospice Orientation  Goal: Demonstrate understanding of hospice philosophy, plan of care, and home hospice program  Description: The patient/family/caregiver will demonstrate understanding of hospice philosophy, plan of care and the home hospice program as evidenced by participation in meeting the patient's psychosocial, spiritual, medical, and physical needs inclusive of medical supplies/equipment focusing on symptoms. Outcome: Progressing Towards Goal     Problem: Potential for Alteration in Skin Integrity  Goal: Monitor skin for areas of alteration in skin integrity  Description: Patient/family/caregiver will demonstrate ability to care for patient's skin, monitor for areas of breakdown, and demonstrate methods to prevent breakdown during hospice care. Outcome: Progressing Towards Goal     Problem: Risk for Falls  Goal: Free of falls during inpatient stay  Description: Patient will be free of falls during inpatient stay. Outcome: Progressing Towards Goal     Problem: Alteration in Mobility  Goal: Remain as independent as possible and remain safe in environment  Description: Patient will remain as independent as possible and remain safe in their environment.   Outcome: Progressing Towards Goal     Problem: Pain  Goal: Assess satisfaction of level of comfort and symptom control  Outcome: Progressing Towards Goal  Goal: *Control of acute pain  Outcome: Progressing Towards Goal     Problem: Anxiety/Agitation  Goal: Verbalize or staff assess the ability to manage anxiety  Description: The patient/family/caregiver will verbalize and demonstrate ability to manage the patient's anxiety throughout hospice care. Outcome: Progressing Towards Goal     Problem: Communication Deficit  Goal: Effectively communicate symptoms, needs, and concerns  Description: Patient/family/caregiver will effectively communicate symptoms, needs and concerns. Outcome: Progressing Towards Goal     Problem: Coping and Emotional Distress  Goal: Demonstrate acceptance of terminal illness and understanding of disease progression  Description: Patient/family/caregiver will demonstrate acceptance of terminal disease and understanding of disease progression while employing appropriate coping mechanisms. Outcome: Progressing Towards Goal     Problem: Pressure Injury - Risk of  Goal: *Prevention of pressure injury  Outcome: Progressing Towards Goal  Note: Pressure Injury Interventions:  Sensory Interventions: Check visual cues for pain,Float heels,Keep linens dry and wrinkle-free,Minimize linen layers,Pressure redistribution bed/mattress (bed type),Turn and reposition approx. every two hours (pillows and wedges if needed)    Moisture Interventions: Absorbent underpads,Internal/External urinary devices,Maintain skin hydration (lotion/cream),Minimize layers    Activity Interventions: Pressure redistribution bed/mattress(bed type)    Mobility Interventions: Float heels,HOB 30 degrees or less,Pressure redistribution bed/mattress (bed type),Turn and reposition approx.  every two hours(pillow and wedges)    Nutrition Interventions:  (NPO at EOL)    Friction and Shear Interventions: HOB 30 degrees or less,Minimize layers                Problem: Pressure Injury - Risk of  Goal: *Prevention of pressure injury  Description: Document Tulio Scale and appropriate interventions in the flowsheet. Outcome: Progressing Towards Goal  Note: Pressure Injury Interventions:  Sensory Interventions: Check visual cues for pain,Float heels,Keep linens dry and wrinkle-free,Minimize linen layers,Pressure redistribution bed/mattress (bed type),Turn and reposition approx. every two hours (pillows and wedges if needed)    Moisture Interventions: Absorbent underpads,Internal/External urinary devices,Maintain skin hydration (lotion/cream),Minimize layers    Activity Interventions: Pressure redistribution bed/mattress(bed type)    Mobility Interventions: Float heels,HOB 30 degrees or less,Pressure redistribution bed/mattress (bed type),Turn and reposition approx.  every two hours(pillow and wedges)    Nutrition Interventions:  (NPO at EOL)    Friction and Shear Interventions: HOB 30 degrees or less,Minimize layers                Problem: Patient Education: Go to Patient Education Activity  Goal: Patient/Family Education  Outcome: Progressing Towards Goal     Problem: Emotional Support Needs  Goal: Patient/family is receiving emotional support  Outcome: Progressing Towards Goal

## 2022-03-07 NOTE — PROGRESS NOTES
Problem: Pressure Injury - Risk of  Goal: *Prevention of pressure injury  Outcome: Progressing Towards Goal  Note: Pressure Injury Interventions:  Sensory Interventions: Assess changes in LOC,Check visual cues for pain,Float heels,Keep linens dry and wrinkle-free,Minimize linen layers,Pad between skin to skin,Pressure redistribution bed/mattress (bed type),Turn and reposition approx. every two hours (pillows and wedges if needed)    Moisture Interventions: Absorbent underpads,Check for incontinence Q2 hours and as needed,Maintain skin hydration (lotion/cream),Minimize layers,Moisture barrier    Activity Interventions: Pressure redistribution bed/mattress(bed type)    Mobility Interventions: Float heels,HOB 30 degrees or less,Pressure redistribution bed/mattress (bed type),Turn and reposition approx.  every two hours(pillow and wedges)    Nutrition Interventions: Document food/fluid/supplement intake,Offer support with meals,snacks and hydration    Friction and Shear Interventions: Apply protective barrier, creams and emollients,HOB 30 degrees or less,Lift sheet,Minimize layers

## 2022-03-07 NOTE — PROGRESS NOTES
[facility] Help to Those in Need  [phone]    Patient Name: Sheree Landis  YOB: 1930    Date of Provider Hospice Visit: 03/07/22    Level of Care:   [x] General Inpatient (GIP)    [] Routine   [] Respite    Current Location of Care:  [] [facility] [] [facility] [] [facility] [] [facility] [] Hospice House [facility]    IF [facility], patient referred from:  [] [facility] [] [facility] [] [id number] [] [facility] [x] Home [] Other:           Real Sellers is a 80y.o. year old who was changed to General In Level of Care. Functionally, the patient's Karnofsky and/or Palliative Performance Scale has declined over a period of days to weeks and is estimated at 10%. The patient is dependent for all ADLs. 3/7-patient transition to GIP level care on 3/6 secondary to respiratory distress, IV medication management, not safe to continue sublingual medication and not safe to transition home. Patient's wife at the bedside this morning. Patient still with some evidence of mild respiratory distress and myoclonic type of activity with his right side. The patient/family chose comfort measures with the support of Hospice. HOSPICE DIAGNOSES   Active Symptoms:  1. Shortness of breath  2. Anxiety  3. Agitation  4. Airway secretions  5. Weakness and fatigue     PLAN   1. Patient will continue GIP level care as he needs frequent nursing assessment, IV medication management, not safe to attempt sublingual medication as this has been ineffective  2. Continue morphine 2 mg IV every 4 hours as needed for pain and shortness of breath  3. Adjust Ativan to 2 mg IV every 4 hours scheduled and every 15 minutes as needed for restlessness  4. Plan reviewed with bedside nursing team  5. Plan reviewed with patient's spouse at the bedside. She is very appreciative of the hospice team and provided us with good stories about her . She showed a several pictures.   Reviewed again that patient had been transitioned to GIP level care so that she understands that we are no longer dealing with the 5-day respite timeline. She is worried about caring for him at home and reassured her I think we are looking at a prognosis of hours to days. 6.  and SW to support family needs  7. Disposition: Death at the hospice house  8. Hospice Plan of care was reviewed in detail and agree with current plan of care    Prognosis estimated based on 03/07/22 clinical assessment is:   [x] Hours to Days    [] Days to Weeks    [] Other:    Communicated plan of care with: Hospice Case Manager; Hospice IDT; Care Team     GOALS OF CARE     Patient/Medical POA stated Goal of Care: comfort care    [] I have reviewed and/or updated ACP information in the Advance Care Planning Navigator. This information is available in the [address] link in the patient's chart header. Pt does not have ADLW document    X Primary Decision [address] ([address]):   Primary Decision Maker: Racheal Guadalupe - [phone]    Secondary Decision Maker: Joyce Lei - Daughter - [phone]    Resuscitation Status: DNR  If DNR is there a Durable DNR on file? : [] Yes [] No (If no, complete Durable DNR)    HISTORY     History obtained from: chart, Hospice Team, nursing staff     Patient was admitted to hospice services in February 2022 after a hospitalization related to acute respiratory failure and acute on chronic diastolic CHF exacerbation due to severe aortic stenosis. He and his wife elected hospice services. Also has diagnoses of T2DM, ulcerative colitis, and polymyalgia rheumatica. He has had symptom management with morphine and ativan, but has had worsening weakness. His wife has been caring for him around the clock and has not been sleeping more than 1 - 2 hours per night. Yesterday, he attempted to ambulate to the bathroom with the rollator but developed \"knee buckling\" and nearly fell backwards onto his wife. He is here at the [facility] for respite care. The patient's principle diagnosis has resulted in  Obtundation, hand tremors, agitation, verbal indicators of pain, difficulty breathing, shortness of breath. REVIEW OF SYSTEMS     The following systems were: [] reviewed  [x] unable to be reviewed    Positive ROS include:  Constitutional: fatigue, weakness, in pain, short of breath  Ears/nose/mouth/throat: increased airway secretions  Respiratory:shortness of breath, wheezing  Gastrointestinal:poor appetite, nausea, vomiting, abdominal pain, constipation, diarrhea  Musculoskeletal:pain, deformities, swelling legs  Neurologic:confusion, hallucinations, weakness  Psychiatric:anxiety, feeling depressed, poor sleep  Endocrine:     Adult Non-Verbal Pain Assessment Score: 4    Face  [] 0   No particular expression or smile  [x] 1   Occasional grimace, tearing, frowning, wrinkled forehead  [] 2   Frequent grimace, tearing, frowning, wrinkled forehead    Activity (movement)  [] 0   Lying quietly, normal position  [x] 1   Seeking attention through movement or slow, cautious movement  [] 2   Restless, excessive activity and/or withdrawal reflexes    Guarding  [] 0   Lying quietly, no positioning of hands over areas of body  [x] 1   Splinting areas of the body, tense  [] 2   Rigid, stiff    Physiology (vital signs)  [] 0   Stable vital signs  [x] 1   Change in any of the following: SBP > 20mm Hg; HR > 20/minute  [] 2   Change in any of the following: SBP > 30mm Hg; HR > 25/minute    Respiratory  [] 0   Baseline RR/SpO2, compliant with ventilator  [x] 1   RR > 10 above baseline, or 5% drop SpO2, mild asynchrony with ventilator  [] 2   RR > 20 above baseline, or 10% drop SpO2, asynchrony with ventilator     FUNCTIONAL ASSESSMENT     Palliative Performance Scale (PPS): 10       PSYCHOSOCIAL/SPIRITUAL ASSESSMENT     Active Problems:    * No active hospital problems.  *    Past Medical History:   Diagnosis Date    Arrhythmia     a fib on occasion    Arthritis     Autoimmune disease ([facility]     Questionable Fibromyalgia    Cancer ([facility]     basal cell on nose, ear and cheek    Chronic pain     [facility]     Hypertension     Other and unspecified hyperlipidemia 4/7/2013    Other ill-defined conditions(799.89)     bilateral glaucoma    Other ill-defined conditions(799.89)     UC    Sleep apnea     Stool color black     Stroke [facility]) 11/2010 4/13, 6/15  TIA    UC (ulcerative colitis) ([facility] 10/14/2009    Unspecified sleep apnea     on bipap      Past Surgical History:   Procedure Laterality Date    HX COLONOSCOPY  12/2014    HX ORTHOPAEDIC      left knee arthroscopy      Social History     Tobacco Use    Smoking status: Never Smoker    Smokeless tobacco: Never Used   Substance Use Topics    Alcohol use: No     Family History   Problem Relation Age of Onset    Stroke Mother     Dementia Mother     Cancer Father         pancreatic    Stroke Brother     Cancer Brother     Stroke Brother       Allergies   Allergen Reactions    Statins-Hmg-Coa Reductase Inhibitors Myalgia      Current Facility-Administered Medications   Medication Dose Route Frequency    LORazepam (ATIVAN) injection 2 mg  2 mg IntraVENous Q4H    LORazepam (INTENSOL) 2 mg/mL oral concentrate 1 mg  1 mg Oral Q1H PRN    LORazepam (ATIVAN) injection 1 mg  1 mg IntraVENous Q15MIN PRN    acetaminophen (TYLENOL) suppository 650 mg  650 mg Rectal Q4H PRN    ketorolac (TORADOL) injection 30 mg  30 mg IntraVENous Q8H PRN    glycopyrrolate (ROBINUL) injection 0.2 mg  0.2 mg IntraVENous Q4H PRN    morphine injection 2 mg  2 mg IntraVENous Q15MIN PRN    morphine injection 2 mg  2 mg IntraVENous Q4H    bisacodyL (DULCOLAX) suppository 10 mg  10 mg Rectal DAILY PRN    morphine (ROXANOL) 100 mg/5 mL (20 mg/mL) concentrated solution 5 mg  5 mg Oral Q1H PRN    hyoscyamine SL (LEVSIN/SL) tablet 0.125 mg  0.125 mg SubLINGual Q4H PRN    prochlorperazine (COMPAZINE) tablet 10 mg  10 mg Oral Q6H PRN  ondansetron (ZOFRAN ODT) tablet 4 mg  4 mg Oral Q4H PRN    haloperidol (HALDOL) 2 mg/mL oral solution 1 mg  1 mg Oral Q6H PRN        PHYSICAL EXAM     Wt Readings from Last 3 Encounters:   02/04/22 82.1 kg (181 lb)   01/31/22 82.1 kg (181 lb)   01/14/22 85.3 kg (188 lb)       Visit Vitals  BP (!) 97/59 (BP 1 Location: Left lower arm, BP Patient Position: Lying left side)   Pulse (!) 120   Temp 100.3 °F (37.9 °C)   Resp 20   SpO2 91%       Supplemental O2  [x] Yes  [] NO       Currently this patient has:  [x] Peripheral IV [] PICC  [] PORT [] ICD    [x] Bah Catheter [] NG Tube   [] PEG Tube    [] Rectal Tube [] Drain  [] Other:     Constitutional: minimally responsive,   Eyes: pupils equal, anicteric  ENMT: +  airway secretions  Cardiovascular: regular rhythm, distal pulses intact  Respiratory: breathing mildly labored, symmetric, few secretions  Gastrointestinal: soft non-tender, +bowel sounds  Musculoskeletal: no deformity, no tenderness to palpation  Skin: warm, dry, pale  Neurologic: Unresponsive  Psychiatric: Restless      Pertinent Lab and or Imaging Tests:  Lab Results   Component Value Date/Time    Sodium 131 (L) 02/04/2022 02:06 AM    Potassium 4.2 02/04/2022 02:06 AM    Chloride 97 02/04/2022 02:06 AM    CO2 24 02/04/2022 02:06 AM    Anion gap 10 02/04/2022 02:06 AM    Glucose 178 (H) 02/04/2022 02:06 AM    BUN 34 (H) 02/04/2022 02:06 AM    Creatinine 1.41 (H) 02/04/2022 02:06 AM    BUN/Creatinine ratio 24 (H) 02/04/2022 02:06 AM    GFR est AA 57 (L) 02/04/2022 02:06 AM    GFR est non-AA 47 (L) 02/04/2022 02:06 AM    Calcium 9.1 02/04/2022 02:06 AM     Lab Results   Component Value Date/Time    Protein, total 7.0 02/03/2022 11:29 AM    Albumin 2.9 (L) 02/03/2022 11:29 AM            Mirlande Greet, MD

## 2022-03-07 NOTE — PROGRESS NOTES
LCSW met with pt and wife at bedside. Pt has declined significantly and now appears to be 0-7. Wife has continued to sit estrada at bedside, leaving only when pt is bathed. She shared that she is holding up well, and though still sleeping very little, she is appreciative of the fact that pt's care has been thorough and that she can be his wife rather than caregiver. LCSW confirmed final arrangements are with [address] and will continue to follow for support.     SHAMAR Benavides, [facility]   [phone]

## 2022-03-07 NOTE — PROGRESS NOTES
1900 Report received from [facility]. Pt is GIP level of care for management of pain and agitation. Dx End Stage Heart Disease. 1940 Resting in bed, wife at bedside. . Pt does not open eyes or respond to verbal or tactile stimulus. Has weak cough but is able to clear secretions from throat. Lung sounds are diminished in the bases. 02 via nasal canula at 3L/M. Has tremor of right arm and it is rigid. 2110 Repositioned to left side. Increased. rigidity and tremors of right arm. Has a wet congested cough. Medicated with prn doses of Morphine, Lorazepam and Robinul for symptom management. Pt has not voided, diaper dry. Discussed garner insertion with wife. She agrees. 2200 Resting quietly, no facial grimace. Tremor in right arm has stopped. Wife resting on sofa. 2300 Appears to be resting quietly. No signs of discomfort. 2350 Wife states pt has tremors of right arm again and cough at times. She states his hands are cold. She appears very tired yet anxious. Educated in signs of EOL and given emotional support. Medicated with scheduled Lorazepam and Morphine. Pt has not voided. Explained that I would place the catheter at this time. She preferred to step out. Pt tolerated well with no response to procedure. Repositioned to his right side for comfort. 0050 Resting quietly, no tremors of right arm. Coarse respirations. 0200 Respirations coarse. Audible secretions. 0243 Audible secretions with breathing. Scheduled SQ medications and prn Robinul given for symptom management. Repositioned to his left side. Had a strong cough which helped to clear secretions. 0400 Secretions improved. Respirations shallow. Resting quietly. 0500 Continue to monitor. Resting quietly, respirations non labored. Secretions lessened. 0600 Resting quietly on left side. Continue to monitor. 3603 Scheduled IV medication given. Wife states pt is breathing better and resting more peacefully on his left side.  She ask that he not be turned at this time. 0700 Report to Lizet Hernandez RN    NAME OF PATIENT:  Joselo Duran    LEVEL OF CARE:  GIP    REASON FOR GIP:   Pain, despite numerous changes in medications, Unmanageable respiratory distress, Terminal agitation, despite changes to medications, Medication adjustment that must be monitored 24/7 and Stabilizing treatment that cannot take place at home    *PATIENT REMAINS ELIGIBLE FOR GIP LEVEL OF CARE AS EVIDENCED BY: Frequent nurse assessment and medication adjustments required. IV/SQ medication route required. REASON FOR RESPITE:  na    O2 SAFETY:  Concentrator positioning (6\" from furniture/drapes), No petroleum based products on face while oxygen in use and Oxygen sign on the door    FALL INTERVENTIONS PROVIDED:   Implemented/recommended use of fall risk identification flag to all team members, Implemented/recommended resources for alarm system (personal alarm, bed alarm, call bell, etc.) , Implemented/recommended environmental changes (remove hazards, lower bed, improve lighting, etc.) and Implemented/recommended increased supervision/assistance    INTERDISPLINARY COMMUNICATION/COLLABORATION:  Physician, MSW, [place] and RN, CNA    NEW MEDICATION INITIATION DOCUMENTATION:  na    Reason medication is being initiated:  No medication changes required this shift    MD / Provider name consulted re: change in status / initiation of new medication:  na    New Symptom(s):  na    New Order(s):  na    Name of the person notified of the changes:  na    Name of person being taught:  na    Instructions given:  na    Side Effects taught: na    Response to teaching:  na      COMFORTABLE DYING MEASURE:  Is Patient/family satisfied with symptom level? Yes    DISCHARGE PLAN:  Remain GIP until symptoms are managed and pt can be cared for in the home.

## 2022-03-07 NOTE — PROGRESS NOTES
0700: report received from [place] [place] [facility]: pt resting quietly with eyes closed, unresponsive to voice and touch. Respirations are even and unlabored. R arm occasionally has some slight tremors. Wife is at the bedside. 0845: full assessment done, see flow sheets. Tylenol suppository given for temp 102. 6. will continue to monitor. 0935: pt resting quietly with eyes closed, respirations even and unlabored. 1005: pt resting quietly with eyes closed, respirations even and unlabored. Wife remains at bedside. 1030: scheduled meds given per MAR. Pt resting quietly with eyes closed. Temp recheck 100.3 axillary. 1100: rounds with Dr Darnell Felix. Pt resting quietly with eyes closed, respirations even and unlabored. No medications changes at this time. Will continue to monitor. 1200: pt resting quietly with eyes closed. Respirations are shallow. Arms are relaxed. 1315: pt resting quietly with eyes closed. Skin is really warm to touch, temp recheck 100 axillary. Will continue to monitor. 1414: scheduled meds given per MAR. Pt RR 30, shallow breaths. 1530: pt resting quietly with eyes closed, respirations are shallow. 1630: pt resting quietly with eyes closed. 1700: wife leaving for a little bit, will be back later. Patient given full bed bath by CNA and RN. Pt repositioned supine and floated with pillows for comfort. Respirations are shallow with some abdominal breathing noted. 1751: scheduled meds given per MAR. Respirations slightly labored through abdomen, very shallow. Asked patient to squeeze my hand with his L hand and he attempted but very weak. 1830: pt resting quietly with eyes closed, respirations shallow. 1900: report given to oncoming nurse.       NAME OF PATIENT:  Joselo Wood    LEVEL OF CARE:  GIP    REASON FOR GIP:   Medication adjustment that must be monitored 24/7 and Stabilizing treatment that cannot take place at home    *PATIENT REMAINS ELIGIBLE FOR GIP LEVEL OF CARE AS EVIDENCED BY: (MUST BE ADDRESSED OF PATIENT GIP)  Pt is requiring frequent nursing assessments to monitor symptoms and IV meds to manage symptoms. REASON FOR RESPITE:  n/a    O2 SAFETY:  Concentrator positioning (6\" from furniture/drapes), Tanks stored in reyez , No petroleum based products on face while oxygen in use and Oxygen sign on the door    FALL INTERVENTIONS PROVIDED:   Implemented/recommended use of non-skid footwear, Implemented/recommended use of fall risk identification flag to all team members, Implemented/recommended assistive devices and encouraged their use, Implemented/recommended resources for alarm system (personal alarm, bed alarm, call bell, etc.) , Implemented/recommended environmental changes (remove hazards, lower bed, improve lighting, etc.) and Implemented/recommended increased supervision/assistance    INTERDISPLINARY COMMUNICATION/COLLABORATION:  Physician, MSW, [place] and RN, CNA    NEW MEDICATION INITIATION DOCUMENTATION:  n/a    Reason medication is being initiated:  n/a    MD / Provider name consulted re: change in status / initiation of new medication:  n/a    New Symptom(s):  n/a    New Order(s):  n/a    Name of the person notified of the changes:  n/a    Name of person being taught:  n/a    Instructions given:  n/a    Side Effects taught:  n/a    Response to teaching:  n/a      COMFORTABLE DYING MEASURE:  Is Patient/family satisfied with symptom level?  yes    DISCHARGE PLAN:  EOL at [facility] or if symptoms should stabilize, pt will return home and continue to be followed by home hospice.

## 2022-03-08 NOTE — PROGRESS NOTES
[facility]  [phone]    Patient Name: Stephanie White  YOB: 1930    Date of Provider Hospice Visit: 03/08/22    Level of Care:   [x] General Inpatient (GIP)    [] Routine   [] Respite    Current Location of Care:  [] [facility] [] [facility] [] [facility] [] [facility] [] [facility], patient referred from:  [] [facility] [] [facility] [] [facility] [] [facility] [x] Home [] Other:           Fercoh Mahajan is a 80y.o. year old who was changed to General In Level of Care. Functionally, the patient's Karnofsky and/or Palliative Performance Scale has declined over a period of days to weeks and is estimated at 10%. The patient is dependent for all ADLs. 3/7-patient transition to [facility] level care on 3/6 secondary to respiratory distress, IV medication management, not safe to continue sublingual medication and not safe to transition home. Patient's wife at the bedside this morning. Patient still with some evidence of mild respiratory distress and myoclonic type of activity with his right side. The patient/family chose comfort measures with the support of Hospice. HOSPICE DIAGNOSES   Active Symptoms:  1. Shortness of breath  2. Anxiety  3. Agitation  4. Airway secretions  5. Weakness and fatigue     PLAN   1. Patient will continue GIP level care as he needs frequent nursing assessment, IV medication management, not safe to attempt sublingual medication as this has been ineffective. Having some evidence of increased work of breathing. Spouse at the bedside  2. Increase morphine 4 mg IV every 4 hours as needed for pain and shortness of breath  3. Continue Ativan to 2 mg IV every 4 hours scheduled and every 15 minutes as needed for restlessness  4. Robinul 0.2 mg IV every 4 hours scheduled  5. Plan reviewed with bedside nursing team  6. Plan reviewed with patient's spouse at the bedside.   She is very appreciative of the hospice team.  Continue to provide active listening    7.  and SW to support family needs  8. Disposition: Death at the hospice house  9. Hospice Plan of care was reviewed in detail and agree with current plan of care    Prognosis estimated based on 03/08/22 clinical assessment is:   [x] Hours to Days    [] Days to Weeks    [] Other:    Communicated plan of care with: Hospice Case Manager; Hospice IDT; Care Team     GOALS OF CARE     Patient/Medical POA stated Goal of Care: comfort care    [] I have reviewed and/or updated ACP information in the Advance Care Planning Navigator. This information is available in the [address] link in the patient's chart header. Pt does not have ADLW document    X Primary Decision [address] ([address]):   Primary Decision Maker: [facility] - [phone]    Secondary Decision Maker: Kemal Ivone - Daughter - [phone]    Resuscitation Status: DNR  If DNR is there a Durable DNR on file? : [] Yes [] No (If no, complete Durable DNR)    HISTORY     History obtained from: chart, Hospice Team, nursing staff     Patient was admitted to hospice services in February 2022 after a hospitalization related to acute respiratory failure and acute on chronic diastolic CHF exacerbation due to severe aortic stenosis. He and his wife elected hospice services. Also has diagnoses of T2DM, ulcerative colitis, and polymyalgia rheumatica. He has had symptom management with morphine and ativan, but has had worsening weakness. His wife has been caring for him around the clock and has not been sleeping more than 1 - 2 hours per night. Yesterday, he attempted to ambulate to the bathroom with the rollator but developed \"knee buckling\" and nearly fell backwards onto his wife. He is here at the [facility] for respite care. The patient's principle diagnosis has resulted in  Obtundation, hand tremors, agitation, verbal indicators of pain, difficulty breathing, shortness of breath.      3/8-patient still show some evidence of increased work of breathing. Slight grimacing         REVIEW OF SYSTEMS     The following systems were: [] reviewed  [x] unable to be reviewed    Positive ROS include:  Constitutional: fatigue, weakness, in pain, short of breath  Ears/nose/mouth/throat: increased airway secretions  Respiratory:shortness of breath, wheezing  Gastrointestinal:poor appetite, nausea, vomiting, abdominal pain, constipation, diarrhea  Musculoskeletal:pain, deformities, swelling legs  Neurologic:confusion, hallucinations, weakness  Psychiatric:anxiety, feeling depressed, poor sleep  Endocrine:     Adult Non-Verbal Pain Assessment Score: 3 slight work of breathing    Face  [] 0   No particular expression or smile  [x] 1   Occasional grimace, tearing, frowning, wrinkled forehead  [] 2   Frequent grimace, tearing, frowning, wrinkled forehead    Activity (movement)  [] 0   Lying quietly, normal position  [x] 1   Seeking attention through movement or slow, cautious movement  [] 2   Restless, excessive activity and/or withdrawal reflexes    Guarding  [x] 0   Lying quietly, no positioning of hands over areas of body  [] 1   Splinting areas of the body, tense  [] 2   Rigid, stiff    Physiology (vital signs)  [] 0   Stable vital signs  [x] 1   Change in any of the following: SBP > 20mm Hg; HR > 20/minute  [] 2   Change in any of the following: SBP > 30mm Hg; HR > 25/minute    Respiratory  [] 0   Baseline RR/SpO2, compliant with ventilator  [x] 1   RR > 10 above baseline, or 5% drop SpO2, mild asynchrony with ventilator  [] 2   RR > 20 above baseline, or 10% drop SpO2, asynchrony with ventilator     FUNCTIONAL ASSESSMENT     Palliative Performance Scale (PPS): 10       PSYCHOSOCIAL/SPIRITUAL ASSESSMENT     Active Problems:    * No active hospital problems.  *    Past Medical History:   Diagnosis Date    Arrhythmia     a fib on occasion    Arthritis     Autoimmune disease ([facility] [address]     Questionable Fibromyalgia    Cancer ([facility] [address]     basal cell on nose, ear and cheek    Chronic pain     Diabetes [facility] [address]     Hypertension     Other and unspecified hyperlipidemia 4/7/2013    Other ill-defined conditions(799.89)     bilateral glaucoma    Other ill-defined conditions(799.89)     UC    Sleep apnea     Stool color black     Stroke [facility]) 11/2010 4/13, 6/15  TIA    UC (ulcerative colitis) ([facility] [address] 10/14/2009    Unspecified sleep apnea     on bipap      Past Surgical History:   Procedure Laterality Date    HX COLONOSCOPY  12/2014    HX ORTHOPAEDIC      left knee arthroscopy      Social History     Tobacco Use    Smoking status: Never Smoker    Smokeless tobacco: Never Used   Substance Use Topics    Alcohol use: No     Family History   Problem Relation Age of Onset    Stroke Mother     Dementia Mother     Cancer Father         pancreatic    Stroke Brother     Cancer Brother     Stroke Brother       Allergies   Allergen Reactions    Statins-Hmg-Coa Reductase Inhibitors Myalgia      Current Facility-Administered Medications   Medication Dose Route Frequency    morphine injection 4 mg  4 mg IntraVENous Q4H    glycopyrrolate (ROBINUL) injection 0.2 mg  0.2 mg IntraVENous Q4H    LORazepam (ATIVAN) injection 2 mg  2 mg IntraVENous Q4H    LORazepam (INTENSOL) 2 mg/mL oral concentrate 1 mg  1 mg Oral Q1H PRN    LORazepam (ATIVAN) injection 1 mg  1 mg IntraVENous Q15MIN PRN    acetaminophen (TYLENOL) suppository 650 mg  650 mg Rectal Q4H PRN    morphine injection 2 mg  2 mg IntraVENous Q15MIN PRN    bisacodyL (DULCOLAX) suppository 10 mg  10 mg Rectal DAILY PRN    morphine (ROXANOL) 100 mg/5 mL (20 mg/mL) concentrated solution 5 mg  5 mg Oral Q1H PRN    hyoscyamine SL (LEVSIN/SL) tablet 0.125 mg  0.125 mg SubLINGual Q4H PRN    prochlorperazine (COMPAZINE) tablet 10 mg  10 mg Oral Q6H PRN    ondansetron (ZOFRAN ODT) tablet 4 mg  4 mg Oral Q4H PRN    haloperidol (HALDOL) 2 mg/mL oral solution 1 mg  1 mg Oral Q6H PRN        PHYSICAL EXAM Wt Readings from Last 3 Encounters:   02/04/22 82.1 kg (181 lb)   01/31/22 82.1 kg (181 lb)   01/14/22 85.3 kg (188 lb)       Visit Vitals  /67 (BP 1 Location: Right lower arm, BP Patient Position: At rest)   Pulse (!) 115   Temp 99.4 °F (37.4 °C)   Resp 30   SpO2 (!) 83%       Supplemental O2  [x] Yes  [] NO       Currently this patient has:  [x] Peripheral IV [] PICC  [] PORT [] ICD    [x] Bah Catheter [] NG Tube   [] PEG Tube    [] Rectal Tube [] Drain  [] Other:     Constitutional: minimally responsive, slight work of breathing  Eyes: pupils equal, anicteric  ENMT: +  airway secretions  Cardiovascular: regular rhythm, distal pulses intact  Respiratory: breathing mildly labored, symmetric, few secretions  Gastrointestinal: soft non-tender, +bowel sounds  Musculoskeletal: no deformity, no tenderness to palpation  Skin: warm, dry, pale  Neurologic: Unresponsive  Psychiatric: Restless      Pertinent Lab and or Imaging Tests:  Lab Results   Component Value Date/Time    Sodium 131 (L) 02/04/2022 02:06 AM    Potassium 4.2 02/04/2022 02:06 AM    Chloride 97 02/04/2022 02:06 AM    CO2 24 02/04/2022 02:06 AM    Anion gap 10 02/04/2022 02:06 AM    Glucose 178 (H) 02/04/2022 02:06 AM    BUN 34 (H) 02/04/2022 02:06 AM    Creatinine 1.41 (H) 02/04/2022 02:06 AM    BUN/Creatinine ratio 24 (H) 02/04/2022 02:06 AM    GFR est AA 57 (L) 02/04/2022 02:06 AM    GFR est non-AA 47 (L) 02/04/2022 02:06 AM    Calcium 9.1 02/04/2022 02:06 AM     Lab Results   Component Value Date/Time    Protein, total 7.0 02/03/2022 11:29 AM    Albumin 2.9 (L) 02/03/2022 11:29 AM            Chelsie Ruggiero MD

## 2022-03-08 NOTE — [facility] IDG MASTER NOTE
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       Description: Patient/family identify their beliefs/practices that impair Hospice experience, Patient/family identify their beliefs/practices that support Hospice experience, Patient coping identified, and Spiritual distress identified and decreased with visit    Disciplines: Interdisciplinary    Intervention: Assess spiritual needs     Dates: Start: 03/04/22       Description: Assist with spiritual questions            Encounter Problems (Active)     Problem: Alteration in Mobility     Dates: Start: 03/03/22       Disciplines: Interdisciplinary    Goal: Remain as independent as possible and remain safe in environment     Dates: Start: 03/03/22   Expected End: 03/12/22       Description: Patient will remain as independent as possible and remain safe in their environment.     Disciplines: Interdisciplinary    Intervention: Assess for deficits in mobility     Dates: Start: 03/03/22          Intervention: Instruct on mobility methods     Dates: Start: 03/03/22 Description: Instruct patient/caregiver on mobility methods. Problem: Anxiety/Agitation     Dates: Start: 03/03/22       Disciplines: Interdisciplinary    Goal: Verbalize or staff assess the ability to manage anxiety     Dates: Start: 03/03/22   Expected End: 03/12/22       Description: The patient/family/caregiver will verbalize and demonstrate ability to manage the patient's anxiety throughout hospice care. Disciplines: Interdisciplinary    Intervention: Assess for anxiety/agitation     Dates: Start: 03/03/22       Description: Assess for signs and symptoms of anxiety and agitation. Intervention: Instruct/Implement strategies to reduce anxiety/agitation     Dates: Start: 03/03/22       Description: Instruct patient/caregiver on strategies to reduce anxiety/agitation. Problem: Communication Deficit     Dates: Start: 03/03/22       Disciplines: Interdisciplinary    Goal: Effectively communicate symptoms, needs, and concerns     Dates: Start: 03/03/22   Expected End: 03/12/22       Description: Patient/family/caregiver will effectively communicate symptoms, needs and concerns. Disciplines: Interdisciplinary    Intervention: Assess for deficit in communication     Dates: Start: 03/03/22          Intervention: Instruct/Implement strategies to effectively communicate     Dates: Start: 03/03/22       Description: Instruct patient/caregiver on strategies to effectively communicate. Problem: Coping and Emotional Distress     Dates: Start: 03/03/22       Disciplines: Interdisciplinary    Goal: Demonstrate acceptance of terminal illness and understanding of disease progression     Dates: Start: 03/03/22   Expected End: 03/12/22       Description: Patient/family/caregiver will demonstrate acceptance of terminal disease and understanding of disease progression while employing appropriate coping mechanisms.     Disciplines: Interdisciplinary    Intervention: Assess for alteration in coping     Dates: Start: 03/03/22          Intervention: Assess for signs/symptoms of emotional distress     Dates: Start: 03/03/22          Intervention: Instruct on effective coping skills     Dates: Start: 03/03/22       Description: Instruct patient/caregiver on effective coping skills. Intervention: Instruct on strategies to reduce emotional distress     Dates: Start: 03/03/22       Description: Instruct patient/caregiver on strategies to reduce emotional distress. Problem: Emotional Support Needs     Dates: Start: 03/07/22       Disciplines: Interdisciplinary    Goal: Patient/family is receiving emotional support     Dates: Start: 03/07/22       Disciplines: Interdisciplinary    Intervention: Provide emotional support     Dates: Start: 03/07/22                Problem: Falls - Risk of     Dates: Start: 03/03/22       Disciplines: Interdisciplinary    Goal: *Absence of Falls     Dates: Start: 03/03/22   Expected End: 03/12/22       Description: Document Virlinda Gamma Fall Risk and appropriate interventions in the flowsheet. Disciplines: Interdisciplinary          Problem: Hospice Orientation     Dates: Start: 03/03/22       Disciplines: Interdisciplinary    Goal: Demonstrate understanding of hospice philosophy, plan of care, and home hospice program     Dates: Start: 03/03/22   Expected End: 03/12/22       Description: The patient/family/caregiver will demonstrate understanding of hospice philosophy, plan of care and the home hospice program as evidenced by participation in meeting the patient's psychosocial, spiritual, medical, and physical needs inclusive of medical supplies/equipment focusing on symptoms.     Disciplines: Interdisciplinary    Intervention: Instruct on hospice philosophy     Dates: Start: 03/03/22          Intervention: Instruct: hospice orientation     Dates: Start: 03/03/22          Intervention: Instruct: medical equipment     Dates: Start: 03/03/22 Description: Instruct patient/caregiver on medical equipment and supplies.        Intervention: Instruct: medical power of  and will     Dates: Start: 03/03/22          Intervention: Instruct:terminal illness     Dates: Start: 03/03/22                Problem: Pain     Dates: Start: 03/03/22       Disciplines: Interdisciplinary    Goal: Assess satisfaction of level of comfort and symptom control     Dates: Start: 03/03/22   Expected End: 03/12/22       Disciplines: Interdisciplinary    Intervention: Assess effectiveness of pain management     Dates: Start: 03/03/22          Intervention: Assess for signs/symptoms of acute pain     Dates: Start: 03/03/22          Intervention: Assess for signs/symptoms of chronic pain     Dates: Start: 03/03/22          Intervention: Implement non-pharmacological pain management     Dates: Start: 03/03/22          Intervention: Instruct on pain scales     Dates: Start: 03/03/22          Intervention: Implement pharmacological pain management     Dates: Start: 03/03/22             Goal: *Control of acute pain     Dates: Start: 03/03/22   Expected End: 03/12/22       Disciplines: Interdisciplinary    Intervention: Assess pain characteristics (eg: Intensity scale; onset; location; quality; severity; duration; frequency; radiation)     Dates: Start: 03/03/22          Intervention: Assess pain management - barriers (eg: Past pain experiences)     Dates: Start: 03/03/22          Intervention: Identify pain expectations (eg: Patient's pain goal; somatic experiences; behavioral changes; affect)     Dates: Start: 03/03/22          Intervention: Identify pain medication concerns (eg: Cultural considerations; addiction concerns)     Dates: Start: 03/03/22          Intervention: Support system identification (eg: Caregiver; community resource; family; friends; Judaism; support group)     Dates: Start: 03/03/22          Intervention: Monitor for change in patient condition (eg:  Vital signs changes; changes in level of consciousness; nausea; behavioral changes)     Dates: Start: 03/03/22          Intervention: Medication side-effect assessment     Dates: Start: 03/03/22          Intervention: Pain-relief response reassessment (eg: Frequency based on route of administration; effectiveness)     Dates: Start: 03/03/22                Problem: Patient Education: Go to Patient Education Activity     Dates: Start: 03/03/22       Disciplines: Interdisciplinary    Goal: Patient/Family Education     Dates: Start: 03/03/22   Expected End: 03/12/22       Disciplines: Interdisciplinary          Problem: Patient Education: Go to Patient Education Activity     Dates: Start: 03/04/22       Disciplines: Interdisciplinary    Goal: Patient/Family Education     Dates: Start: 03/04/22       Disciplines: Interdisciplinary          Problem: Potential for Alteration in Skin Integrity     Dates: Start: 03/03/22       Disciplines: Interdisciplinary    Goal: Monitor skin for areas of alteration in skin integrity     Dates: Start: 03/03/22   Expected End: 03/12/22       Description: Patient/family/caregiver will demonstrate ability to care for patient's skin, monitor for areas of breakdown, and demonstrate methods to prevent breakdown during hospice care.     Disciplines: Interdisciplinary    Intervention: Assess for skin breakdown     Dates: Start: 03/03/22          Intervention: Implement strategies to reduce risk of skin breakdown     Dates: Start: 03/03/22                Problem: Pressure Injury - Risk of     Dates: Start: 03/03/22       Disciplines: Interdisciplinary    Goal: *Prevention of pressure injury     Dates: Start: 03/03/22   Expected End: 03/12/22       Disciplines: Interdisciplinary    Intervention: Pressure injury risk assessment tool     Dates: Start: 03/03/22          Intervention: Pressure-relieving device needs assessment     Dates: Start: 03/03/22          Intervention: Pressure-relieving device implementation (eg: Specialty beds; wheel chair cushions; heel lift boots)     Dates: Start: 03/03/22          Intervention: Skin assessment (e.g. existing ulcers, color; integrity; moisture; tanvi prominences; blisters; friction/shear injuries)     Dates: Start: 03/03/22          Intervention: Skin monitoring and care (e.g. skin cleansing; keep dry, moisturize, utilization of  lotion and/or skin barrier cream)     Dates: Start: 03/03/22          Intervention: Position change (eg: Techniques to position; turn and transfer per schedule; cushion tanvi prominences; float heels; encourage mobility)     Dates: Start: 03/03/22                Problem: Pressure Injury - Risk of     Dates: Start: 03/04/22       Disciplines: Interdisciplinary    Goal: *Prevention of pressure injury     Dates: Start: 03/04/22       Description: Document Tulio Scale and appropriate interventions in the flowsheet. Disciplines: Interdisciplinary          Problem: Risk for Falls     Dates: Start: 03/03/22       Disciplines: Interdisciplinary    Goal: Free of falls during inpatient stay     Dates: Start: 03/03/22   Expected End: 03/12/22       Description: Patient will be free of falls during inpatient stay. Disciplines: Interdisciplinary    Intervention: Assess fall risk     Dates: Start: 03/03/22       Description: Complete fall risk assessment on admission, recertification, and as indicated on fall.        Intervention: Instruct on fall prevention     Dates: Start: 03/03/22       Description: Call for assistance with ambulation and transfers during inpatient stay            Care Plan Problems/Goals  Report    0 of 15 Goals Met 0 of 15 Met     Progressing Towards Goal (15)      *Absence of Falls (Falls - Risk of)      Patient/Family Education (Patient Education: Go to Patient Education Activity)      Demonstrate understanding of hospice philosophy, plan of care, and home hospice program (Hospice Orientation)      Monitor skin for areas of alteration in skin integrity (Potential for Alteration in Skin Integrity)      Free of falls during inpatient stay (Risk for Falls)      Remain as independent as possible and remain safe in environment (Alteration in Mobility)      Assess satisfaction of level of comfort and symptom control (Pain)      *Control of acute pain (Pain)      Verbalize or staff assess the ability to manage anxiety (Anxiety/Agitation)      Effectively communicate symptoms, needs, and concerns (Communication Deficit)      Demonstrate acceptance of terminal illness and understanding of disease progression (Coping and Emotional Distress)      *Prevention of pressure injury (Pressure Injury - Risk of)      *Prevention of pressure injury (Pressure Injury - Risk of)      Patient/Family Education (Patient Education: Go to Patient Education Activity)      Patient/family is receiving emotional support (Emotional Support Needs)                        ___________________    Care Team Notes    IDG clinical note- pt is GIP at [facility] related to resp distress, secretions, and frequent med changes. Pt is needing care that cant be done in a home setting at this time. Pt requires frequent medical and nursing interventions. POC/IDG Notes      [facility] ID  Notes by Conor Darnell at 03/08/22 1105  Version 1 of 1    Author: Conor Darnell Service: Hospice and Palliative Care Author Type:     Filed: 03/08/22 1106 Date of Service: 03/08/22 1105 Status: Signed    : Conor Darnell ()       [facility] IDG note:   LCSW participated in [place] rounds and met with wife yesterday. She continues to sit estrada at bedside. Pt remains unresponsive with increased secretions today. Team to adjust medications and continue to provide support to wife. Problem: Support deficit  Plan: LCSW will continue to provide bedside support to wife  Community Resources: [facility] for GIP care  Advance Directives:  On file  DDNR: On file  Gena stark Home: [employer] [address] [place] [address]    [phone]                Care Team Present:     [address] DESIREE Pang RN, Nurse Leader

## 2022-03-08 NOTE — PROGRESS NOTES
NAME OF PATIENT:  Joselo Gloria    LEVEL OF CARE:  [facility]    REASON FOR GIP:   Pain, despite numerous changes in medications and Unmanageable respiratory distress    *PATIENT REMAINS ELIGIBLE FOR [facility] LEVEL OF CARE AS EVIDENCED BY: (MUST BE ADDRESSED OF PATIENT GIP)  Nurse assessment, medication adjustment, IV medications are required to manage it. REASON FOR RESPITE:  n/a    O2 SAFETY:  Concentrator positioning (6\" from furniture/drapes), No petroleum based products on face while oxygen in use and Oxygen sign on the door    FALL INTERVENTIONS PROVIDED:   Implemented/recommended use of non-skid footwear, Implemented/recommended use of fall risk identification flag to all team members, Implemented/recommended assistive devices and encouraged their use, Implemented/recommended resources for alarm system (personal alarm, bed alarm, call bell, etc.) , Implemented/recommended environmental changes (remove hazards, lower bed, improve lighting, etc.) and Implemented/recommended increased supervision/assistance    INTERDISPLINARY COMMUNICATION/COLLABORATION:  Physician, MSW, Sergio and RN, CNA    NEW MEDICATION INITIATION DOCUMENTATION:  n/a    Reason medication is being initiated:  n/a    MD / Provider name consulted re: change in status / initiation of new medication:  n/a    New Symptom(s):  n/a    New Order(s):  n/a    Name of the person notified of the changes:  n/a    Name of person being taught:  n/a    Instructions given:  n/a    Side Effects taught:  n/a    Response to teaching:  n/a      COMFORTABLE DYING MEASURE:  Is Patient/family satisfied with symptom level?  yes    DISCHARGE PLAN:  Discharge home when symptoms are managed and meds can be given at home    SHIFT NOTES  1900-Handoff report received from [facility]. Patient admitted for [facility] level of care due to pain and SOB. Admitting diagnosis is end stage heart disease.      Neuro=Unresponsive, nonverbal  Cardio=Sinus tachy  Respiratory=3L/O2 NC  GI=NPO  =Bah draining hayley color urine  Skin=Intact  Access=bilateral back arm SQ sites    1930-Patient resting quietly with eyes closed and mouth open. Respirations are are even and unlabored. Patient unresponsive to voice and touch. No facial grimace or signs of discomfort. Wife present at the bedside. 2000-Shift assessment complete, see flow sheets. Patient had a temp of 101.7, therefore Tylenol was administered. Will continue to monitor. Robinul was also administered at this time due to congestion and secretions. 2100-Patient resting quietly with eyes closed. Respirations are are even and unlabored. No facial grimace or signs of discomfort. Wife present at the bedside. 2200-Scheduled Ativan and Morphine given at this time. Patient also turned to right side. 2300-Patient resting quietly with eyes closed. Respirations are are even and unlabored. Wife present at the bedside. 0000-Patient resting quietly with eyes closed. Respirations are are even and unlabored. No facial grimace or signs of discomfort. Wife present at the bedside. 0100-Patient resting quietly with eyes closed. Respirations are are even and unlabored. No facial grimace or signs of discomfort. Wife present at the bedside. 0200-Scheduled Ativan and Morphine given at this time. Patient also turned to right side. 0300-Patient resting quietly with eyes closed. Respirations are are even and unlabored. Wife present at the bedside. 0400-Patient resting quietly with eyes closed. Respirations are are even and unlabored. Wife present at the bedside. 0500-Patient resting quietly with eyes closed. Respirations are are even and unlabored. No facial grimace or signs of discomfort. Wife present at the bedside. 0600-Scheduled Ativan and Morphine given at this time. Pillows repositioned and adjusted. Bah emptied at 125 ml for the shift. 0630-PRN Robinul admin to assist with secretions.      SHIFT SUMMARY  Per wife, patient sleeps better on his L side. Occasional secretions rattling in throat may be heard; consider Robinul.      0700-Handoff report given to oncoming nurse, [address] RN.

## 2022-03-08 NOTE — PROGRESS NOTES
Problem: Falls - Risk of  Goal: *Absence of Falls  Description: Document Eliazar Jose Fall Risk and appropriate interventions in the flowsheet. Outcome: Progressing Towards Goal  Note: Fall Risk Interventions:  Mobility Interventions: Bed/chair exit alarm    Mentation Interventions: Door open when patient unattended,Room close to nurse's station    Medication Interventions: Bed/chair exit alarm,Evaluate medications/consider consulting pharmacy    Elimination Interventions: Stay With Me (per policy),Toileting schedule/hourly rounds    History of Falls Interventions: Door open when patient unattended,Room close to nurse's station         Problem: Patient Education: Go to Patient Education Activity  Goal: Patient/Family Education  Outcome: Progressing Towards Goal     Problem: Hospice Orientation  Goal: Demonstrate understanding of hospice philosophy, plan of care, and home hospice program  Description: The patient/family/caregiver will demonstrate understanding of hospice philosophy, plan of care and the home hospice program as evidenced by participation in meeting the patient's psychosocial, spiritual, medical, and physical needs inclusive of medical supplies/equipment focusing on symptoms. Outcome: Progressing Towards Goal     Problem: Potential for Alteration in Skin Integrity  Goal: Monitor skin for areas of alteration in skin integrity  Description: Patient/family/caregiver will demonstrate ability to care for patient's skin, monitor for areas of breakdown, and demonstrate methods to prevent breakdown during hospice care. Outcome: Progressing Towards Goal     Problem: Risk for Falls  Goal: Free of falls during inpatient stay  Description: Patient will be free of falls during inpatient stay.   Outcome: Progressing Towards Goal     Problem: Alteration in Mobility  Goal: Remain as independent as possible and remain safe in environment  Description: Patient will remain as independent as possible and remain safe in their environment. Outcome: Progressing Towards Goal     Problem: Pain  Goal: Assess satisfaction of level of comfort and symptom control  Outcome: Progressing Towards Goal  Goal: *Control of acute pain  Outcome: Progressing Towards Goal     Problem: Anxiety/Agitation  Goal: Verbalize or staff assess the ability to manage anxiety  Description: The patient/family/caregiver will verbalize and demonstrate ability to manage the patient's anxiety throughout hospice care. Outcome: Progressing Towards Goal     Problem: Communication Deficit  Goal: Effectively communicate symptoms, needs, and concerns  Description: Patient/family/caregiver will effectively communicate symptoms, needs and concerns. Outcome: Progressing Towards Goal     Problem: Coping and Emotional Distress  Goal: Demonstrate acceptance of terminal illness and understanding of disease progression  Description: Patient/family/caregiver will demonstrate acceptance of terminal disease and understanding of disease progression while employing appropriate coping mechanisms.   Outcome: Progressing Towards Goal     Problem: Pressure Injury - Risk of  Goal: *Prevention of pressure injury  Outcome: Progressing Towards Goal  Note: Pressure Injury Interventions:  Sensory Interventions: Keep linens dry and wrinkle-free,Minimize linen layers    Moisture Interventions: Apply protective barrier, creams and emollients,Internal/External urinary devices,Maintain skin hydration (lotion/cream),Minimize layers,Moisture barrier    Activity Interventions: Pressure redistribution bed/mattress(bed type),PT/OT evaluation    Mobility Interventions: HOB 30 degrees or less,Pressure redistribution bed/mattress (bed type)    Nutrition Interventions: Document food/fluid/supplement intake    Friction and Shear Interventions: Apply protective barrier, creams and emollients,HOB 30 degrees or less,Minimize layers                Problem: Pressure Injury - Risk of  Goal: *Prevention of pressure injury  Description: Document Tulio Scale and appropriate interventions in the flowsheet.   Outcome: Progressing Towards Goal     Problem: Patient Education: Go to Patient Education Activity  Goal: Patient/Family Education  Outcome: Progressing Towards Goal     Problem: Emotional Support Needs  Goal: Patient/family is receiving emotional support  Outcome: Progressing Towards Goal

## 2022-03-08 NOTE — PROGRESS NOTES
0700: Report received from [facility].   1231: Patient resting in bed with eyes closed. Patient's wife, Franchesca Pitts, is at the bedside. Patient assessment complete. Patient's respirations are currently regular depth and rhythm, but his wife reports that throughout the last few hours, his breathing pattern has changed frequently and has been rapid, shallow, apneic at times. 0815: Patient is resting quietly with eyes closed, appears sleeping. Neutral facial position observed. Respirations are regular depth and rhythm. Secretions are audible at the bedside. 0915: Patient resting quietly with eyes closed. Respirations are regular depth and rhythm. Secretions are audible at the bedside. Head of bed is elevated to 30 degrees. 1015: Scheduled lorazepam 2mg and morphine 2mg IV administered. Patient is resting quietly with eyes closed, appears sleeping. Neutral facial position observed. Respirations are regular depth and rhythm with audible secretions present. Patient's wife is at the bedside, education provided on audible secretions. 1100: Patient is resting quietly with eyes closed, appears sleeping. Respirations are regular depth and rhythm, secretions remain audible at the bedside. 1200: Scheduled glycopyrrolate 0.2mg administered. Secretions remain audible at the bedside. Patient's wife is at the bedside   1300: Patient's wife remains at the bedside. Patient is resting with eyes closed, neutral facial position observed. Respirations remain audible at the bedside. 1345: Patient is resting quietly with eyes closed. Respirations are regular depth and rhythm, unlabored. Respiratory secretions are less audible than when last rounded. 1445: Scheduled lorazepam 2mg and morphine 4mg administered. Patient resting quietly while wife remains at the bedside. : Patient's wife leaving shortly to go home to take care of herself and the cat, will be returning.  Patient is resting quietly  1600: Scheduled glycopyrrolate 0.2mg administered. Patient is resting quietly with eyes closed. Respirations are unlabored. Secretions are minimally audible at this time   1630:Patient bathed with Saint Davies, CNA. Patient tolerated well. Unresponsive during care  1745: Patient is resting quietly with eyes closed, appears sleeping. Neutral facial position observed. Respirations are regular depth and rhythm. 1755: Called to patient's room by Saint Davies, CNA. Patient appears to have stopped breathing. Patient found to be without signs of life after 2 minutes of ausculation. Time of Death 3/8/22 at 56  Patient's wife, Pamela So grieving appropriately. [facility] as been selected for services.                 NAME OF PATIENT:  Joselo Lainez    LEVEL OF CARE:  [facility]    REASON FOR GIP:   Unmanageable respiratory distress, Medication adjustment that must be monitored 24/7 and Stabilizing treatment that cannot take place at home    *PATIENT REMAINS ELIGIBLE FOR [facility] LEVEL OF CARE AS EVIDENCED BY: (MUST BE ADDRESSED OF PATIENT GIP)      REASON FOR RESPITE:  NA    O2 SAFETY:  NA    FALL INTERVENTIONS PROVIDED:   Implemented/recommended resources for alarm system (personal alarm, bed alarm, call bell, etc.) , Implemented/recommended environmental changes (remove hazards, lower bed, improve lighting, etc.) and Implemented/recommended increased supervision/assistance    INTERDISPLINARY COMMUNICATION/COLLABORATION:  Physician, MSW, Sergio and RN, CNA    NEW MEDICATION INITIATION DOCUMENTATION:  Documentation completed in Clinical Note in [facility] Care    Reason medication is being initiated:  Dyspnea, increased respiratory secretions    MD / Provider name consulted re: change in status / initiation of new medication:  Dr. Marylen Hutchinson Symptom(s):  Increased respiratory secretions and dyspnea    New Order(s):  Morphine 4mg IV q 4 hours, glycopyrrolate 0.2mg IV q 4 hours     Name of the person notified of the changes:  Chepe Funk Sharon Gonzalez    Name of person being taught:  Clinton Rutherford    Instructions given:  Yes    Side Effects taught:  Yes    Response to teaching:  Verbalized understanding      COMFORTABLE DYING MEASURE:  Is Patient/family satisfied with symptom level?  yes    DISCHARGE PLAN:  HOme

## 2022-03-08 NOTE — [facility] IDG SOCIAL WORKER NOTES
[address] note:   LCSW participated in [facility] rounds and met with wife yesterday. She continues to sit estrada at bedside. Pt remains unresponsive with increased secretions today. Team to adjust medications and continue to provide support to wife. Problem: Support deficit  Plan: LCSW will continue to provide bedside support to wife  Community Resources: [facility]  Advance Directives:  On file  DDNR: On file   Home: [employer] of [address] [place] [address] Drive    [phone]

## 2022-03-09 ENCOUNTER — HOME CARE VISIT (OUTPATIENT)
Dept: HOSPICE | Facility: HOSPICE | Age: 87
End: 2022-03-09
Payer: MEDICARE

## 2022-03-09 NOTE — HOSPICE
[address] Bereavement/Condolence Call: This MSW called pts wife, Dali Diehl to offer condolences and support. MSW offered [address] information for ongoing support. She is grieving appropriately and asked about DME . LCSW to f/u.        [address]    [phone]

## 2022-03-09 NOTE — PROGRESS NOTES
Problem: Falls - Risk of  Goal: *Absence of Falls  Description: Document Dennie Oak Fall Risk and appropriate interventions in the flowsheet. Outcome: Resolved/Not Met     Problem: Patient Education: Go to Patient Education Activity  Goal: Patient/Family Education  Outcome: Resolved/Not Met     Problem: Hospice Orientation  Goal: Demonstrate understanding of hospice philosophy, plan of care, and home hospice program  Description: The patient/family/caregiver will demonstrate understanding of hospice philosophy, plan of care and the home hospice program as evidenced by participation in meeting the patient's psychosocial, spiritual, medical, and physical needs inclusive of medical supplies/equipment focusing on symptoms. Outcome: Resolved/Not Met     Problem: Potential for Alteration in Skin Integrity  Goal: Monitor skin for areas of alteration in skin integrity  Description: Patient/family/caregiver will demonstrate ability to care for patient's skin, monitor for areas of breakdown, and demonstrate methods to prevent breakdown during hospice care. Outcome: Resolved/Not Met     Problem: Risk for Falls  Goal: Free of falls during inpatient stay  Description: Patient will be free of falls during inpatient stay. Outcome: Resolved/Not Met     Problem: Alteration in Mobility  Goal: Remain as independent as possible and remain safe in environment  Description: Patient will remain as independent as possible and remain safe in their environment. Outcome: Resolved/Not Met     Problem: Pain  Goal: Assess satisfaction of level of comfort and symptom control  Outcome: Resolved/Not Met  Goal: *Control of acute pain  Outcome: Resolved/Not Met     Problem: Anxiety/Agitation  Goal: Verbalize or staff assess the ability to manage anxiety  Description: The patient/family/caregiver will verbalize and demonstrate ability to manage the patient's anxiety throughout hospice care.   Outcome: Resolved/Not Met     Problem: Communication Deficit  Goal: Effectively communicate symptoms, needs, and concerns  Description: Patient/family/caregiver will effectively communicate symptoms, needs and concerns. Outcome: Resolved/Not Met     Problem: Coping and Emotional Distress  Goal: Demonstrate acceptance of terminal illness and understanding of disease progression  Description: Patient/family/caregiver will demonstrate acceptance of terminal disease and understanding of disease progression while employing appropriate coping mechanisms. Outcome: Resolved/Not Met     Problem: Pressure Injury - Risk of  Goal: *Prevention of pressure injury  Outcome: Resolved/Not Met     Problem: Pressure Injury - Risk of  Goal: *Prevention of pressure injury  Description: Document Tulio Scale and appropriate interventions in the flowsheet.   Outcome: Resolved/Not Met     Problem: Patient Education: Go to Patient Education Activity  Goal: Patient/Family Education  Outcome: Resolved/Not Met     Problem: Emotional Support Needs  Goal: Patient/family is receiving emotional support  Outcome: Resolved/Not Met

## 2024-04-19 NOTE — ADDENDUM NOTE
Addended by: Crescencio St on: 2/14/2022 11:04 AM     Modules accepted: Orders All other review of systems negative, except as noted in HPI